# Patient Record
Sex: FEMALE | Race: WHITE | NOT HISPANIC OR LATINO | Employment: OTHER | URBAN - METROPOLITAN AREA
[De-identification: names, ages, dates, MRNs, and addresses within clinical notes are randomized per-mention and may not be internally consistent; named-entity substitution may affect disease eponyms.]

---

## 2021-01-17 ENCOUNTER — IMMUNIZATION (OUTPATIENT)
Dept: INTERNAL MEDICINE | Facility: CLINIC | Age: 86
End: 2021-01-17
Payer: MEDICARE

## 2021-01-17 DIAGNOSIS — Z23 NEED FOR VACCINATION: Primary | ICD-10-CM

## 2021-01-17 PROCEDURE — 91300 COVID-19, MRNA, LNP-S, PF, 30 MCG/0.3 ML DOSE VACCINE: CPT | Mod: PBBFAC,PO

## 2021-02-07 ENCOUNTER — IMMUNIZATION (OUTPATIENT)
Dept: INTERNAL MEDICINE | Facility: CLINIC | Age: 86
End: 2021-02-07
Payer: MEDICARE

## 2021-02-07 DIAGNOSIS — Z23 NEED FOR VACCINATION: Primary | ICD-10-CM

## 2021-02-07 PROCEDURE — 0002A COVID-19, MRNA, LNP-S, PF, 30 MCG/0.3 ML DOSE VACCINE: CPT | Mod: PBBFAC,PO

## 2021-02-07 PROCEDURE — 91300 COVID-19, MRNA, LNP-S, PF, 30 MCG/0.3 ML DOSE VACCINE: CPT | Mod: PBBFAC,PO

## 2021-10-22 ENCOUNTER — TELEPHONE (OUTPATIENT)
Dept: CARDIOLOGY | Facility: CLINIC | Age: 86
End: 2021-10-22

## 2021-11-08 ENCOUNTER — LAB VISIT (OUTPATIENT)
Dept: LAB | Facility: HOSPITAL | Age: 86
End: 2021-11-08
Attending: INTERNAL MEDICINE
Payer: MEDICARE

## 2021-11-08 ENCOUNTER — OFFICE VISIT (OUTPATIENT)
Dept: CARDIOLOGY | Facility: CLINIC | Age: 86
End: 2021-11-08
Payer: MEDICARE

## 2021-11-08 VITALS
DIASTOLIC BLOOD PRESSURE: 74 MMHG | HEART RATE: 97 BPM | HEIGHT: 61 IN | WEIGHT: 129.63 LBS | SYSTOLIC BLOOD PRESSURE: 133 MMHG | BODY MASS INDEX: 24.47 KG/M2

## 2021-11-08 DIAGNOSIS — E78.00 PURE HYPERCHOLESTEROLEMIA: ICD-10-CM

## 2021-11-08 DIAGNOSIS — I50.32 CHRONIC DIASTOLIC CONGESTIVE HEART FAILURE: ICD-10-CM

## 2021-11-08 DIAGNOSIS — I73.9 PERIPHERAL VASCULAR DISEASE: ICD-10-CM

## 2021-11-08 DIAGNOSIS — I44.2 CHB (COMPLETE HEART BLOCK): ICD-10-CM

## 2021-11-08 DIAGNOSIS — N18.2 CKD (CHRONIC KIDNEY DISEASE) STAGE 2, GFR 60-89 ML/MIN: ICD-10-CM

## 2021-11-08 DIAGNOSIS — Z95.1 STATUS POST CORONARY ARTERY BYPASS GRAFTING: ICD-10-CM

## 2021-11-08 DIAGNOSIS — I35.0 AORTIC STENOSIS, SEVERE: ICD-10-CM

## 2021-11-08 DIAGNOSIS — I25.119 CORONARY ARTERY DISEASE INVOLVING NATIVE CORONARY ARTERY OF NATIVE HEART WITH ANGINA PECTORIS: Primary | ICD-10-CM

## 2021-11-08 DIAGNOSIS — Z95.0 CARDIAC RESYNCHRONIZATION THERAPY PACEMAKER (CRT-P) IN PLACE: ICD-10-CM

## 2021-11-08 DIAGNOSIS — I10 PRIMARY HYPERTENSION: ICD-10-CM

## 2021-11-08 DIAGNOSIS — Z95.3 HISTORY OF AORTIC VALVE REPLACEMENT WITH BIOPROSTHETIC VALVE: ICD-10-CM

## 2021-11-08 PROBLEM — E78.5 HYPERLIPIDEMIA: Status: ACTIVE | Noted: 2021-11-08

## 2021-11-08 PROBLEM — M81.0 OSTEOPOROSIS: Status: ACTIVE | Noted: 2021-11-08

## 2021-11-08 PROBLEM — I50.9 CONGESTIVE HEART FAILURE: Status: ACTIVE | Noted: 2021-10-04

## 2021-11-08 LAB
ANION GAP SERPL CALC-SCNC: 13 MMOL/L (ref 8–16)
BASOPHILS # BLD AUTO: 0.06 K/UL (ref 0–0.2)
BASOPHILS NFR BLD: 0.9 % (ref 0–1.9)
BUN SERPL-MCNC: 15 MG/DL (ref 8–23)
CALCIUM SERPL-MCNC: 9.6 MG/DL (ref 8.7–10.5)
CHLORIDE SERPL-SCNC: 97 MMOL/L (ref 95–110)
CO2 SERPL-SCNC: 24 MMOL/L (ref 23–29)
CREAT SERPL-MCNC: 1.2 MG/DL (ref 0.5–1.4)
DIFFERENTIAL METHOD: ABNORMAL
EOSINOPHIL # BLD AUTO: 0.5 K/UL (ref 0–0.5)
EOSINOPHIL NFR BLD: 7 % (ref 0–8)
ERYTHROCYTE [DISTWIDTH] IN BLOOD BY AUTOMATED COUNT: 15.5 % (ref 11.5–14.5)
EST. GFR  (AFRICAN AMERICAN): 47.6 ML/MIN/1.73 M^2
EST. GFR  (NON AFRICAN AMERICAN): 41.3 ML/MIN/1.73 M^2
GLUCOSE SERPL-MCNC: 96 MG/DL (ref 70–110)
HCT VFR BLD AUTO: 33.3 % (ref 37–48.5)
HGB BLD-MCNC: 10.4 G/DL (ref 12–16)
IMM GRANULOCYTES # BLD AUTO: 0.04 K/UL (ref 0–0.04)
IMM GRANULOCYTES NFR BLD AUTO: 0.6 % (ref 0–0.5)
LYMPHOCYTES # BLD AUTO: 1.7 K/UL (ref 1–4.8)
LYMPHOCYTES NFR BLD: 24.7 % (ref 18–48)
MCH RBC QN AUTO: 28.7 PG (ref 27–31)
MCHC RBC AUTO-ENTMCNC: 31.2 G/DL (ref 32–36)
MCV RBC AUTO: 92 FL (ref 82–98)
MONOCYTES # BLD AUTO: 0.7 K/UL (ref 0.3–1)
MONOCYTES NFR BLD: 9.7 % (ref 4–15)
NEUTROPHILS # BLD AUTO: 3.8 K/UL (ref 1.8–7.7)
NEUTROPHILS NFR BLD: 57.1 % (ref 38–73)
NRBC BLD-RTO: 0 /100 WBC
PLATELET # BLD AUTO: 389 K/UL (ref 150–450)
PMV BLD AUTO: 9.2 FL (ref 9.2–12.9)
POTASSIUM SERPL-SCNC: 4 MMOL/L (ref 3.5–5.1)
RBC # BLD AUTO: 3.63 M/UL (ref 4–5.4)
SODIUM SERPL-SCNC: 134 MMOL/L (ref 136–145)
WBC # BLD AUTO: 6.67 K/UL (ref 3.9–12.7)

## 2021-11-08 PROCEDURE — 1160F PR REVIEW ALL MEDS BY PRESCRIBER/CLIN PHARMACIST DOCUMENTED: ICD-10-PCS | Mod: CPTII,S$GLB,, | Performed by: INTERNAL MEDICINE

## 2021-11-08 PROCEDURE — 1159F PR MEDICATION LIST DOCUMENTED IN MEDICAL RECORD: ICD-10-PCS | Mod: CPTII,S$GLB,, | Performed by: INTERNAL MEDICINE

## 2021-11-08 PROCEDURE — 1126F PR PAIN SEVERITY QUANTIFIED, NO PAIN PRESENT: ICD-10-PCS | Mod: CPTII,S$GLB,, | Performed by: INTERNAL MEDICINE

## 2021-11-08 PROCEDURE — 80048 BASIC METABOLIC PNL TOTAL CA: CPT | Performed by: INTERNAL MEDICINE

## 2021-11-08 PROCEDURE — 99999 PR PBB SHADOW E&M-EST. PATIENT-LVL III: CPT | Mod: PBBFAC,,, | Performed by: INTERNAL MEDICINE

## 2021-11-08 PROCEDURE — 93000 EKG 12-LEAD: ICD-10-PCS | Mod: S$GLB,,, | Performed by: INTERNAL MEDICINE

## 2021-11-08 PROCEDURE — 1101F PT FALLS ASSESS-DOCD LE1/YR: CPT | Mod: CPTII,S$GLB,, | Performed by: INTERNAL MEDICINE

## 2021-11-08 PROCEDURE — 3288F FALL RISK ASSESSMENT DOCD: CPT | Mod: CPTII,S$GLB,, | Performed by: INTERNAL MEDICINE

## 2021-11-08 PROCEDURE — 3075F PR MOST RECENT SYSTOLIC BLOOD PRESS GE 130-139MM HG: ICD-10-PCS | Mod: CPTII,S$GLB,, | Performed by: INTERNAL MEDICINE

## 2021-11-08 PROCEDURE — 3075F SYST BP GE 130 - 139MM HG: CPT | Mod: CPTII,S$GLB,, | Performed by: INTERNAL MEDICINE

## 2021-11-08 PROCEDURE — 99999 PR PBB SHADOW E&M-EST. PATIENT-LVL III: ICD-10-PCS | Mod: PBBFAC,,, | Performed by: INTERNAL MEDICINE

## 2021-11-08 PROCEDURE — 1101F PR PT FALLS ASSESS DOC 0-1 FALLS W/OUT INJ PAST YR: ICD-10-PCS | Mod: CPTII,S$GLB,, | Performed by: INTERNAL MEDICINE

## 2021-11-08 PROCEDURE — 1126F AMNT PAIN NOTED NONE PRSNT: CPT | Mod: CPTII,S$GLB,, | Performed by: INTERNAL MEDICINE

## 2021-11-08 PROCEDURE — 93000 ELECTROCARDIOGRAM COMPLETE: CPT | Mod: S$GLB,,, | Performed by: INTERNAL MEDICINE

## 2021-11-08 PROCEDURE — 85025 COMPLETE CBC W/AUTO DIFF WBC: CPT | Performed by: INTERNAL MEDICINE

## 2021-11-08 PROCEDURE — 3288F PR FALLS RISK ASSESSMENT DOCUMENTED: ICD-10-PCS | Mod: CPTII,S$GLB,, | Performed by: INTERNAL MEDICINE

## 2021-11-08 PROCEDURE — 99204 OFFICE O/P NEW MOD 45 MIN: CPT | Mod: S$GLB,,, | Performed by: INTERNAL MEDICINE

## 2021-11-08 PROCEDURE — 99204 PR OFFICE/OUTPT VISIT, NEW, LEVL IV, 45-59 MIN: ICD-10-PCS | Mod: S$GLB,,, | Performed by: INTERNAL MEDICINE

## 2021-11-08 PROCEDURE — 36415 COLL VENOUS BLD VENIPUNCTURE: CPT | Mod: PO | Performed by: INTERNAL MEDICINE

## 2021-11-08 PROCEDURE — 3078F DIAST BP <80 MM HG: CPT | Mod: CPTII,S$GLB,, | Performed by: INTERNAL MEDICINE

## 2021-11-08 PROCEDURE — 1159F MED LIST DOCD IN RCRD: CPT | Mod: CPTII,S$GLB,, | Performed by: INTERNAL MEDICINE

## 2021-11-08 PROCEDURE — 1160F RVW MEDS BY RX/DR IN RCRD: CPT | Mod: CPTII,S$GLB,, | Performed by: INTERNAL MEDICINE

## 2021-11-08 PROCEDURE — 3078F PR MOST RECENT DIASTOLIC BLOOD PRESSURE < 80 MM HG: ICD-10-PCS | Mod: CPTII,S$GLB,, | Performed by: INTERNAL MEDICINE

## 2021-11-08 RX ORDER — POTASSIUM CHLORIDE 750 MG/1
10 CAPSULE, EXTENDED RELEASE ORAL DAILY
COMMUNITY
End: 2021-11-22 | Stop reason: SDUPTHER

## 2021-11-08 RX ORDER — METOPROLOL TARTRATE 25 MG/1
25 TABLET, FILM COATED ORAL DAILY
COMMUNITY
End: 2021-11-22 | Stop reason: SDUPTHER

## 2021-11-08 RX ORDER — ATORVASTATIN CALCIUM 40 MG/1
40 TABLET, FILM COATED ORAL DAILY
COMMUNITY
End: 2021-11-22 | Stop reason: SDUPTHER

## 2021-11-08 RX ORDER — ACETAMINOPHEN AND CODEINE PHOSPHATE 300; 15 MG/1; MG/1
1 TABLET ORAL EVERY 4 HOURS PRN
COMMUNITY
End: 2022-04-14

## 2021-11-08 RX ORDER — MULTIVITAMIN
1 TABLET ORAL DAILY
COMMUNITY

## 2021-11-08 RX ORDER — FUROSEMIDE 40 MG/1
40 TABLET ORAL 2 TIMES DAILY
COMMUNITY
End: 2021-11-22 | Stop reason: SDUPTHER

## 2021-11-08 RX ORDER — ACETAMINOPHEN 325 MG/1
325 TABLET ORAL DAILY PRN
COMMUNITY

## 2021-11-08 RX ORDER — ASPIRIN 325 MG
325 TABLET ORAL DAILY
COMMUNITY
End: 2021-11-22 | Stop reason: SDUPTHER

## 2021-11-08 RX ORDER — LORATADINE 10 MG/1
10 TABLET ORAL DAILY
COMMUNITY

## 2021-11-09 ENCOUNTER — TELEPHONE (OUTPATIENT)
Dept: CARDIOLOGY | Facility: CLINIC | Age: 86
End: 2021-11-09
Payer: MEDICARE

## 2021-11-15 ENCOUNTER — TELEPHONE (OUTPATIENT)
Dept: CARDIOLOGY | Facility: CLINIC | Age: 86
End: 2021-11-15
Payer: MEDICARE

## 2021-11-19 ENCOUNTER — TELEPHONE (OUTPATIENT)
Dept: CARDIAC REHAB | Facility: CLINIC | Age: 86
End: 2021-11-19
Payer: MEDICARE

## 2021-11-22 RX ORDER — ASPIRIN 325 MG
325 TABLET ORAL DAILY
Qty: 90 TABLET | Refills: 3 | Status: SHIPPED | OUTPATIENT
Start: 2021-11-22

## 2021-11-22 RX ORDER — METOPROLOL TARTRATE 25 MG/1
25 TABLET, FILM COATED ORAL DAILY
Qty: 90 TABLET | Refills: 3 | Status: SHIPPED | OUTPATIENT
Start: 2021-11-22 | End: 2021-12-17 | Stop reason: SDUPTHER

## 2021-11-22 RX ORDER — ATORVASTATIN CALCIUM 40 MG/1
40 TABLET, FILM COATED ORAL DAILY
Qty: 90 TABLET | Refills: 3 | Status: SHIPPED | OUTPATIENT
Start: 2021-11-22 | End: 2022-04-14 | Stop reason: SDUPTHER

## 2021-11-22 RX ORDER — POTASSIUM CHLORIDE 750 MG/1
10 CAPSULE, EXTENDED RELEASE ORAL DAILY
Qty: 90 CAPSULE | Refills: 3 | Status: SHIPPED | OUTPATIENT
Start: 2021-11-22 | End: 2021-12-17 | Stop reason: SDUPTHER

## 2021-11-22 RX ORDER — FUROSEMIDE 40 MG/1
40 TABLET ORAL 2 TIMES DAILY
Qty: 90 TABLET | Refills: 3 | Status: ON HOLD | OUTPATIENT
Start: 2021-11-22 | End: 2022-07-06 | Stop reason: SDUPTHER

## 2021-11-23 DIAGNOSIS — I25.10 CORONARY ARTERY DISEASE INVOLVING NATIVE CORONARY ARTERY OF NATIVE HEART WITHOUT ANGINA PECTORIS: ICD-10-CM

## 2021-11-23 DIAGNOSIS — Z95.1 POSTSURGICAL AORTOCORONARY BYPASS STATUS: Primary | ICD-10-CM

## 2021-12-08 ENCOUNTER — TELEPHONE (OUTPATIENT)
Dept: CARDIAC REHAB | Facility: CLINIC | Age: 86
End: 2021-12-08
Payer: MEDICARE

## 2021-12-13 ENCOUNTER — HOSPITAL ENCOUNTER (OUTPATIENT)
Dept: CARDIOLOGY | Facility: HOSPITAL | Age: 86
Discharge: HOME | End: 2021-12-13
Attending: INTERNAL MEDICINE
Payer: MEDICARE

## 2021-12-13 VITALS
SYSTOLIC BLOOD PRESSURE: 157 MMHG | DIASTOLIC BLOOD PRESSURE: 72 MMHG | HEIGHT: 61 IN | WEIGHT: 130 LBS | BODY MASS INDEX: 24.55 KG/M2 | HEART RATE: 88 BPM

## 2021-12-13 DIAGNOSIS — Z95.1 POSTSURGICAL AORTOCORONARY BYPASS STATUS: ICD-10-CM

## 2021-12-13 DIAGNOSIS — I25.10 CORONARY ARTERY DISEASE INVOLVING NATIVE CORONARY ARTERY OF NATIVE HEART WITHOUT ANGINA PECTORIS: ICD-10-CM

## 2021-12-13 LAB
CV STRESS BASE HR: 88 BPM
DIASTOLIC BLOOD PRESSURE: 72 MMHG
OHS CV CPX 1 MINUTE RECOVERY HEART RATE: 105 BPM
OHS CV CPX 85 PERCENT MAX PREDICTED HEART RATE MALE: 112
OHS CV CPX ABDOMINAL GIRTH: 36 CM
OHS CV CPX DATA GRADE - PEAK: 1
OHS CV CPX DATA O2 SAT - PEAK: 98
OHS CV CPX DATA O2 SAT - REST: 98
OHS CV CPX DATA SPEED - PEAK: 1.2
OHS CV CPX DATA TIME - PEAK: 1.55
OHS CV CPX DATA VE/VCO2 - PEAK: 111
OHS CV CPX DATA VE/VO2 - PEAK: 90
OHS CV CPX DATA VO2 - PEAK: 9.5
OHS CV CPX DATA VO2 - REST: 5.2
OHS CV CPX ESTIMATED METS: 2
OHS CV CPX FEV1/FVC: 0.93
OHS CV CPX FORCED EXPIRATORY VOLUME: 0.97
OHS CV CPX FORCED VITAL CAPACITY (FVC): 1.04
OHS CV CPX HIGHEST VO: 25.1
OHS CV CPX MAX PREDICTED HEART RATE: 131
OHS CV CPX MAXIMAL VOLUNTARY VENTILATION (MVV) PREDICTED: 38.8
OHS CV CPX MAXIMAL VOLUNTARY VENTILATION (MVV): 31
OHS CV CPX MAXIUMUM EXERCISE VENTILATION (VE MAX): 14
OHS CV CPX PATIENT AGE: 85
OHS CV CPX PATIENT HEIGHT IN: 61
OHS CV CPX PATIENT IS FEMALE AGE 11-19: 0
OHS CV CPX PATIENT IS FEMALE AGE GREATER THAN 19: 1
OHS CV CPX PATIENT IS FEMALE AGE LESS THAN 11: 0
OHS CV CPX PATIENT IS FEMALE: 1
OHS CV CPX PATIENT IS MALE AGE 11-25: 0
OHS CV CPX PATIENT IS MALE AGE GREATER THAN 25: 0
OHS CV CPX PATIENT IS MALE AGE LESS THAN 11: 0
OHS CV CPX PATIENT IS MALE GREATER THAN 18: 0
OHS CV CPX PATIENT IS MALE LESS THAN OR EQUAL TO 18: 0
OHS CV CPX PATIENT IS MALE: 0
OHS CV CPX PATIENT WEIGHT RETURNED IN OZ: 2080
OHS CV CPX PEAK DIASTOLIC BLOOD PRESSURE: 80 MMHG
OHS CV CPX PEAK HEAR RATE: 104 BPM
OHS CV CPX PEAK RATE PRESSURE PRODUCT: NORMAL
OHS CV CPX PEAK SYSTOLIC BLOOD PRESSURE: 167 MMHG
OHS CV CPX PERCENT BODY FAT: 27.3
OHS CV CPX PERCENT MAX PREDICTED HEART RATE ACHIEVED: 79
OHS CV CPX PREDICTED VO2: 25.1 ML/KG/MIN
OHS CV CPX RATE PRESSURE PRODUCT PRESENTING: NORMAL
OHS CV CPX REST PET CO2: 24
OHS CV CPX VE/VCO2 SLOPE: 37.7
STRESS ECHO POST EXERCISE DUR MIN: 1 MINUTES
STRESS ECHO POST EXERCISE DUR SEC: 33 SECONDS
SYSTOLIC BLOOD PRESSURE: 157 MMHG

## 2021-12-13 PROCEDURE — 94621 CARDIOPULM EXERCISE TESTING: CPT | Mod: 26,,, | Performed by: INTERNAL MEDICINE

## 2021-12-13 PROCEDURE — 94621 CARDIOPULM EXERCISE TESTING: CPT

## 2021-12-13 PROCEDURE — 94621 CARDIOPULMONARY EXERCISE TESTING (CUPID ONLY): ICD-10-PCS | Mod: 26,,, | Performed by: INTERNAL MEDICINE

## 2021-12-17 ENCOUNTER — OFFICE VISIT (OUTPATIENT)
Dept: CARDIOLOGY | Facility: CLINIC | Age: 86
End: 2021-12-17
Payer: MEDICARE

## 2021-12-17 VITALS
HEIGHT: 61 IN | SYSTOLIC BLOOD PRESSURE: 134 MMHG | WEIGHT: 130 LBS | DIASTOLIC BLOOD PRESSURE: 69 MMHG | HEART RATE: 69 BPM | BODY MASS INDEX: 24.55 KG/M2

## 2021-12-17 DIAGNOSIS — I25.119 CORONARY ARTERY DISEASE INVOLVING NATIVE CORONARY ARTERY OF NATIVE HEART WITH ANGINA PECTORIS: Primary | Chronic | ICD-10-CM

## 2021-12-17 DIAGNOSIS — Z95.1 STATUS POST CORONARY ARTERY BYPASS GRAFTING: Chronic | ICD-10-CM

## 2021-12-17 DIAGNOSIS — Z95.0 CARDIAC RESYNCHRONIZATION THERAPY PACEMAKER (CRT-P) IN PLACE: ICD-10-CM

## 2021-12-17 DIAGNOSIS — Z95.3 HISTORY OF AORTIC VALVE REPLACEMENT WITH BIOPROSTHETIC VALVE: Chronic | ICD-10-CM

## 2021-12-17 DIAGNOSIS — I10 PRIMARY HYPERTENSION: Chronic | ICD-10-CM

## 2021-12-17 DIAGNOSIS — I35.0 AORTIC STENOSIS, SEVERE: Chronic | ICD-10-CM

## 2021-12-17 DIAGNOSIS — E78.00 PURE HYPERCHOLESTEROLEMIA: Chronic | ICD-10-CM

## 2021-12-17 PROBLEM — I73.9 PERIPHERAL VASCULAR DISEASE: Chronic | Status: ACTIVE | Noted: 2021-11-08

## 2021-12-17 PROBLEM — I44.2 CHB (COMPLETE HEART BLOCK): Chronic | Status: ACTIVE | Noted: 2021-10-09

## 2021-12-17 PROBLEM — N18.31 STAGE 3A CHRONIC KIDNEY DISEASE: Status: ACTIVE | Noted: 2021-10-18

## 2021-12-17 PROCEDURE — 99214 PR OFFICE/OUTPT VISIT, EST, LEVL IV, 30-39 MIN: ICD-10-PCS | Mod: S$GLB,,, | Performed by: INTERNAL MEDICINE

## 2021-12-17 PROCEDURE — 99999 PR PBB SHADOW E&M-EST. PATIENT-LVL III: ICD-10-PCS | Mod: PBBFAC,,, | Performed by: INTERNAL MEDICINE

## 2021-12-17 PROCEDURE — 99999 PR PBB SHADOW E&M-EST. PATIENT-LVL III: CPT | Mod: PBBFAC,,, | Performed by: INTERNAL MEDICINE

## 2021-12-17 PROCEDURE — 99214 OFFICE O/P EST MOD 30 MIN: CPT | Mod: S$GLB,,, | Performed by: INTERNAL MEDICINE

## 2021-12-17 RX ORDER — METOPROLOL TARTRATE 25 MG/1
25 TABLET, FILM COATED ORAL DAILY
Qty: 90 TABLET | Refills: 3 | Status: ON HOLD | OUTPATIENT
Start: 2021-12-17 | End: 2022-07-06 | Stop reason: HOSPADM

## 2021-12-17 RX ORDER — POTASSIUM CHLORIDE 750 MG/1
10 CAPSULE, EXTENDED RELEASE ORAL 2 TIMES DAILY
Qty: 180 CAPSULE | Refills: 3 | Status: SHIPPED | OUTPATIENT
Start: 2021-12-17 | End: 2022-01-12 | Stop reason: SDUPTHER

## 2021-12-23 ENCOUNTER — CLINICAL SUPPORT (OUTPATIENT)
Dept: CARDIAC REHAB | Facility: CLINIC | Age: 86
End: 2021-12-23
Payer: MEDICARE

## 2021-12-23 VITALS — OXYGEN SATURATION: 99 % | SYSTOLIC BLOOD PRESSURE: 172 MMHG | HEART RATE: 80 BPM | DIASTOLIC BLOOD PRESSURE: 70 MMHG

## 2021-12-23 DIAGNOSIS — Z95.3 HISTORY OF AORTIC VALVE REPLACEMENT WITH BIOPROSTHETIC VALVE: ICD-10-CM

## 2021-12-23 DIAGNOSIS — I25.10 CORONARY ARTERY DISEASE, UNSPECIFIED VESSEL OR LESION TYPE, UNSPECIFIED WHETHER ANGINA PRESENT, UNSPECIFIED WHETHER NATIVE OR TRANSPLANTED HEART: ICD-10-CM

## 2021-12-23 DIAGNOSIS — Z95.1 STATUS POST CORONARY ARTERY BYPASS GRAFTING: ICD-10-CM

## 2021-12-23 PROCEDURE — 99999 PR PBB SHADOW E&M-EST. PATIENT-LVL III: ICD-10-PCS | Mod: PBBFAC,,,

## 2021-12-23 PROCEDURE — 99999 PR PBB SHADOW E&M-EST. PATIENT-LVL III: CPT | Mod: PBBFAC,,,

## 2021-12-23 PROCEDURE — 93798 PHYS/QHP OP CAR RHAB W/ECG: CPT | Mod: S$GLB,,, | Performed by: INTERNAL MEDICINE

## 2021-12-23 PROCEDURE — 93798 PR CARDIAC REHAB/MONITOR: ICD-10-PCS | Mod: S$GLB,,, | Performed by: INTERNAL MEDICINE

## 2022-01-12 ENCOUNTER — CLINICAL SUPPORT (OUTPATIENT)
Dept: CARDIAC REHAB | Facility: CLINIC | Age: 87
End: 2022-01-12
Payer: MEDICARE

## 2022-01-12 DIAGNOSIS — Z95.1 POSTSURGICAL AORTOCORONARY BYPASS STATUS: ICD-10-CM

## 2022-01-12 DIAGNOSIS — I25.119 CORONARY ARTERY DISEASE INVOLVING NATIVE CORONARY ARTERY OF NATIVE HEART WITH ANGINA PECTORIS: Chronic | ICD-10-CM

## 2022-01-12 DIAGNOSIS — I25.10 ATHEROSCLEROSIS OF NATIVE CORONARY ARTERY OF NATIVE HEART, UNSPECIFIED WHETHER ANGINA PRESENT: ICD-10-CM

## 2022-01-12 PROCEDURE — 93798 PR CARDIAC REHAB/MONITOR: ICD-10-PCS | Mod: S$GLB,,,

## 2022-01-12 PROCEDURE — 93798 PHYS/QHP OP CAR RHAB W/ECG: CPT | Mod: S$GLB,,,

## 2022-01-12 RX ORDER — POTASSIUM CHLORIDE 750 MG/1
10 CAPSULE, EXTENDED RELEASE ORAL 2 TIMES DAILY
Qty: 180 CAPSULE | Refills: 3 | Status: SHIPPED | OUTPATIENT
Start: 2022-01-12 | End: 2022-07-04

## 2022-01-12 NOTE — PROGRESS NOTES
Pt arrived for cardiac rehab class.  Pt ate and had no medication changes. The patient tolerated exercise session well with no complaints

## 2022-01-12 NOTE — TELEPHONE ENCOUNTER
----- Message from Héctor Maher sent at 1/12/2022  9:18 AM CST -----  Contact: carmina [phone]  Mrn# [id number]    Callback# [phone]    Additional Info# PT was supposed to get a refill of 180 but PT only had 90 and now the PT is completely out. Please callback when sent to pharmacy.     Medication- potassium chloride (MICRO-K) 10 MEQ [facility]    Pharmacy- [id number]  [address]UNOZ  [address] AT [facility]    Pharmacy#- [phone]

## 2022-01-13 ENCOUNTER — DOCUMENTATION ONLY (OUTPATIENT)
Dept: CARDIAC REHAB | Facility: CLINIC | Age: 87
End: 2022-01-13
Payer: MEDICARE

## 2022-01-13 NOTE — PROGRESS NOTES
Miss Lutz has completed 1 out of 36 exercise session of Phase II cardiac rehab.  A follow up reassessment will be completed at 12 sessions.    Session: Orientation   Cardiac Rehab Individual Treatment Plan - Initial Assessment      Patient Name: Nelda Rawls MRN: [id number]   : 1935   Age: 86 y.o.   Primary Diagnosis: CABG  Date of Event: 10-14-21  EF: ?%  Risk Stratification: high  Referring Physician: YE   Exercise Assessment:     CPX/TM Date: 21 Results   RHR 88   Max    Peak VO2 (CPX only) 9.5   Actual METS (CPX only) 2.7   Estimated METS 2.0     Anthropometrics    Height 61 inches   Weight 130 lbs   BMI 24.6   Abdominal Girth 36   Body Composition 27.3%     ST Depression noted on Stress Test?:No  Angina with exercise?: No   Fall Risk: No   Assistive Devices:  walker   Currently exercising? No   There were no limitations noted by the patient.      Exercise Plan:   Goals:  CR Exercise Goals: Attend Cardiac Rehab 3 times/week: In Progress  Home Aerobic Exercise: 2 additional days/week for 30-60 minutes: In Progress  Intensity of 12-15 on the Rate of Perceived Exertion (RPE) scale: In Progress  30% increase in entry estimated METS: 2.6 : In Progress  5 days/week for 30-60 minutes: In Progress    Intervention:   Discussed importance of regular attendance to cardiac rehab class    Exercise Prescription:  THR Range    Mode: Treadmill  Recumbent Bike  Nustep  Elliptical   Frequency:  3 days/week   Duration:  30 - 60 minutes   Intensity:  12 - 15 RPE   Resistance Training:  Yes: 0 to 5 lb weights with 10-15 reps based on strength and range of motion assessment     Home Prescription:  Mode Aerobic   Frequency: 2- 3 days/week   Duration: 30-60 minutes   Resistance Training: None        Education:  Orientation to Equipment; verbalizes understanding; Date: 21  Exercise Recommendations; verbalizes understanding; Date: 21  Exercise Safety; verbalizes understanding; Date:  21  Class Preparation: verbalizes understanding; Date: 21  Signs and symptoms to report: verbalizes understanding; Date: 21  Caffeine/Hydration: verbalizes understanding; Date: 21  Exercise Terminology: verbalizes understanding; Date: 21  Resistance Training: verbalizes understanding; Date: 21    Comments:  Miss Lutz was encouraged to begin thinking about some type of aerobic exercise she can participate in at least 2 non-rehab days per week for at least 30 minutes in addition to attending Phase II cardiac rehab classes 3 days per week.  I suggested she walk around her home 10 minutes 3 times per day but to also work toward that goal slowly.  She stated understanding.    All consent forms were signed, proper attire and shoes were discussed.       Miss Lutz will begin Cardiac Rehab on  at 10:00am.    The exercise prescription will be adjusted based on tolerance of exercise intensity by patient.    Sohan Singh, CEP    Orientation Cardiac Rehab Individual Treatment Plan - Initial Assessment      Patient Name: Nelda Rawls MRN: [id number]   : 1935   Age: 86 y.o.   Primary Diagnosis: s/p CABG, CAD, HTN, HLD, PVD, CHF, complete heart block, + pacemaker, h/o AVR, CKD III    Nutrition Assessment:     Anthropometrics    Height 61 inches   Weight 130 lbs   BMI 24.6   Abdominal Girth 36   Body Composition 27.3       Drug Allergies and Intolerances:  Review of patient's allergies indicates:   Allergen Reactions    Penicillins Hives and Swelling       Food Allergies and Intolerances:  NA    Past Medical History:  No past medical history on file.    Past Surgical History:  Past Surgical History:   Procedure Laterality Date    CORONARY ARTERY BYPASS GRAFT         Medications:  Current Outpatient Medications   Medication Sig    acetaminophen (TYLENOL) 325 MG tablet Take 325 mg by mouth as needed for Pain.    acetaminophen-codeine 300-15mg (TYLENOL #2) 300-15 mg  per tablet Take 1 tablet by mouth every 4 (four) hours as needed for Pain.    aspirin 325 MG tablet Take 1 tablet (325 mg total) by mouth once daily.    atorvastatin (LIPITOR) 40 MG tablet Take 1 tablet (40 mg total) by mouth once daily.    furosemide (LASIX) 40 MG tablet Take 1 tablet (40 mg total) by mouth 2 (two) times daily.    loratadine (CLARITIN) 10 mg tablet Take 10 mg by mouth once daily.    metoprolol tartrate (LOPRESSOR) 25 MG tablet Take 1 tablet (25 mg total) by mouth Daily. Takes 0.5mg BID    multivitamin (THERAGRAN) per tablet Take 1 tablet by mouth once daily.    potassium chloride (MICRO-K) 10 MEQ [facility] Take 1 capsule (10 mEq total) by mouth 2 (two) times daily.     No current facility-administered medications for this visit.       Vitamins and Supplements:  MVI, micro-K 10meq BID (lasix 40mg BID)    Labs:  Patient confirms she is taking lipitor 40mg for cholesterol control.    Lab Results   Component Value Date    CHOL 128 12/13/2021     Lab Results   Component Value Date    HDL 53 12/13/2021     Lab Results   Component Value Date    LDLCALC 61.4 (L) 12/13/2021     Lab Results   Component Value Date    TRIG 68 12/13/2021     Lab Results   Component Value Date    CHOLHDL 41.4 12/13/2021         Lab Results   Component Value Date    GLUF 96 12/13/2021     No results found for: HGBA1C    Nutrition/Diet History:  Patient eats 3 meals daily.    Seasons food with Mrs. Nagy.  Patient denies use of a salt shaker at the table on prepared foods.   Dines out 1 per week at restaurants such as [employer], [employer].    Chooses fried foods rarely  Chooses fish rarely   Beverages:  water, coffee  Alcohol: none    24 Hour Recall:  · Breakfast: oatmeal, banana, black coffee x 2  · Lunch:turkey with american cheese, miracle whip/mustard on honey wheat bread, veggie chips  · Dinner:roast with boiled potatoes, emma slaw, peach cobbler  · Other: 3 sandwich cookies    Difficulty Chewing or  Swallowing: no  Current Exercise: See Exercise Physiologist Note  Food Safety/Food Preparation: pt has daughters/helper that prepares food and freezes  Living Arrangements/Family Support: Lives with spouse  Cultural/Spiritual/Personal Preferences: not applicable   Barriers to Education: none identified  Stage of Change Related to Diet Habits: Contemplation    Nutrition Diagnosis:  1. Food and nutrition related knowledge deficit related to the lack of prior nutrition education as evidenced by diet history and 24 hour recall    Nutrition Plan:   Goals:  LDL-C < 70 (for high risk patients)  Hgb A1c < 7%  BMI < 25 and abdominal girth < 40M/<35 F  2 gram sodium, Mediterranean diet  Rehab weight goal: Maintenance  Fish intake (non-fried varieties) to a goal of 2-3 servings per week.   Increase fruit and vegetable intake    Interventions/Recommendations:  Lab results reviewed and discussed  Nutrition Prescription:  · Total Energy Estimated Needs: 5995-1178 Kcal/d for weight maintenance  · Method for Estimating Needs: 25-30kcal/kg  · Total Protein Estimated Needs: 35-59g/d  · Method for Estimating Needs: 0.6-1 g/Kg BW (reduced needs r/t dx CKD III)  · Total Fluid Estimated Needs: 1 mL/Kcal  Dietitian Consult: No  Patient to participate in Cardiac Rehab sessions three times a week  Weekly Dietitian Weight Check  Encouraged patient to complete 3 day food diary  Follow Up Plan for Ongoing Self-Management Support    Education:  Mediterranean Diet; verbalizes understanding; Date: 12/23/21   Person taught: patient and daughter   Preferred Learning Method: Verbal and written   Education Needed/Provided: Nutrition counseling and education related to cardiac rehabilitation   Education Method: Weekly nutrition lectures on the Mediterranean diet, cooking, shopping, and dining out   Written Materials Provided: 3 Day Food Record, Introduction to Mediterranean Diet   Strategies Implemented: Motivational interviewing, Goal setting,  Self-Monitoring, and Problem Solving    Comments:   Discussed ways to incorporate healthy snacks, eating on a schedule, and monitoring sodium intake for heart health.  Encouraged increased produce intake and increased hydration    Diabetes  Is the patient diabetic? No      RD contact information provided.      Brandi Quintero MS, RDN/LDN    Session: Orientation Cardiac Rehab Individual Treatment Plan - Initial Assessment      Patient Name: Nelda Rawls MRN: [id number]   : 1935   Age: 86 y.o.   Date of Event: 10/14/2021   Primary Diagnosis: CABG    EF: unknown    Physical Assessment:   There were no vitals taken for this visit.    ASSESSMENT:  Heart Sounds: regular rate and rhythm, S1, S2 normal, no murmur, click, rub or gallop  Prosthetic Valve: Yes  Lung Sounds: normal air entry, lungs clear to auscultation  Capillary Refill: normal  Left Radial Pulse: Normal (+2)  Right Radial Pulse: Normal (+2)  Left Pedal Pulse: Normal (+2)  Right Pedal Pulse: Normal (+2)  Right Edema: none  Left Edema none  Strength: normal  Range of Motion: full range of motion  Existing Limitations:      Site   [] Arthritis, bursitis    [] Amputation, atrophy    [] Other:    []       Diabetic patient's foot examination comments: None -  not diabetic  Incisional site: CABG site healing well, no drainage or redness noted  Special needs: pt currently ambulating with a walker    Psychosocial Assessment:   Outcome Survey Tools:    CATE SCORES:   PRE   Anxiety 1   Depression 0   Somatic 1   Hostility 0     SF-36 SCORES:   PRE   Physical Function 27   Social Function 7   Mental Health 29   Pain 10   Change in Health 2   Physical Role Limitation 1   Mental Role Limitation 3   Energy/Fatigue 12   Health Perceptions 22   Total Score 113     PHQ-9:  PHQ-9 Depression Patient Health Questionnaire 2021   Over the last two weeks how often have you been bothered by little interest or pleasure in doing things 0   Over the last two weeks how  often have you been bothered by feeling down, depressed or hopeless 0   Over the last two weeks how often have you been bothered by trouble falling or staying asleep, or sleeping too much 0   Over the last two weeks how often have you been bothered by feeling tired or having little energy 2   Over the last two weeks how often have you been bothered by a poor appetite or overeating 0   Over the last two weeks how often have you been bothered by feeling bad about yourself - or that you are a failure or have let yourself or your family down 0   Over the last two weeks how often have you been bothered by trouble concentrating on things, such as reading the newspaper or watching television 0   Over the last two weeks how often have you been bothered by moving or speaking so slowly that other people could have noticed. 0   Over the last two weeks how often have you been bothered by thoughts that you would be better off dead, or of hurting yourself 0   If you checked off any problems, how difficult have these problems made it for you to do your work, take care of things at home or get along with other people? Not difficult at all   Total Score 2              Living Arrangements: Lives with spouse  Family Support: children and paid caregiver  Self Reported: Effective Coping Skills and Stress  Displays: happiness and calmness  Medication: pt does not take medications at this time    Psychosocial Plan:   Goals:  Verbalizes coping mechanisms  Reduce manifestation of stress  Maintain positive support system  Maintain positive outlook  Improve overall quality of life    Interventions/Recommendations:  Discussed Results of Surveys  Patient to Self Report Emotional Changes at Session Check In  Recommend Physical Activity  Recommend Attending Education Lectures  Notify MD: No  Program Referral: No  Pharmaceutical Intervention/Therapy: No  Other Needs: not applicable  Stage of Readiness to Change: Contemplation    Education:  Stress  management, verbalizes understanding; Date:12/23/2021  Stress, verbalizes understanding; Date:12/23/2021    Comments:  Pt caring for her , verbalized increased stress with hurricaine. Pt instructed on resources for managing stress verbalized understanding.   Patient has been instructed to notify staff in the event that circumstances worsen.  Patient verbalizes understanding.    Other Core Components/Risk Factors Assessment:   RISK FACTORS:  hyperlipidemia, hypertension, positive family history, sedentary lifestyle, stress    Learning Barriers: None    Education Level:  Unknown    Pre-test Score: 50    Medication Compliance: has been compliant with taking medications    Other Core Components/Risk Factors Plan:   Goals:  Increase exercise tolerance: In Progress  Increase knowledge of CAD: In Progress  Decrease blood pressure: In Progress  Identify and manage personal areas of stress: In Progress  Learn more about healthy eating: In Progress    Interventions/Recommendations:  Recommend regular attendance for Cardiac Rehab: Exercise and Education Lectures  Encourage medication compliance  Individual Education/ Counseling: No  Physician Referral: No    Education:    blood pressure meds, verbalizes understanding; Date: 12/23/2021  cholesterol, verbalizes understanding; Date: 12/23/2021  cholesterol meds, verbalizes understanding; Date: 12/23/2021  hypertension, verbalizes understanding; Date: 12/23/2021  physical activity, verbalizes understanding; Date: 12/23/2021  risk factors, verbalizes understanding; Date: 12/23/2021  stress, verbalizes understanding; Date: 12/23/2021        Education method adapted to patients education level and preferred method of learning.  Method: explanation    Comments:  Pt instructed on risk factors, medication compliance, importance of exercise, benefits of cardiac rehab, reduction of stress and effects on the body. Pt verbalized understanding.    Other Core Components/Hypertension  Assessment:   Resting BP:   BP Readings from Last 1 Encounters:   12/23/21 (!) 172/70         BP Diagnosis: Hypertensive  Patient reported symptoms: none    Other Core Components/Hypertension Plan:   Goals:  Blood Pressure <130/80    Interventions/Recommendations:  Encourage medication compliance  Recommend physical activity  Educate on contributory factors  Reduce stress, anxiety, anger, depression, and/or chronic pain    Education:    Hypertension; verbalizes understanding; Date: 12/23/2021  Coronary Artery Disease; verbalizes understanding; Date: 12/23/2021  Risk Factors; verbalizes understanding; Date: 12/23/2021  Stress; verbalizes understanding; Date: 12/23/2021        Comments:  Pt reports compliance with medications. Pt instructed on taking medications prior to cardiac rehab, monitoring blood pressure, exercise to reduce stress levels; pt verbalized understanding.       Does the patient have Heart Failure? No    Other Core Components/Tobacco Cessation Assessment:   Smoking Status: lifetime non-smoker  Smoking Cessation Barriers: NA  Stage of Readiness to Change: Maintenance    Other Core Components/Tobacco Cessation Plan:   Goals:  Maintain non-smoking status    Interventions:  Maintains non-smoking status    Education:    Benefits of Cardiac Rehab; verbalizes understanding; Date: 12/23/2021        Comments:  Pt nonsmoker lifelong.    Discussed Cardiac Rehab program in depth with patient.  Medication list updated per patient & marked as reviewed.  Patient has been instructed to notify staff of any problems while attending rehab (ie: chest pain, shortness of breath, lightheadedness, dizziness).  Patient has been instructed to monitor blood pressure readings outside of rehab & to keep a daily log of the readings.  Patient verbalizes understanding.    Roberth Mota RN  Cardiac Rehab Nurse

## 2022-01-14 ENCOUNTER — CLINICAL SUPPORT (OUTPATIENT)
Dept: CARDIAC REHAB | Facility: CLINIC | Age: 87
End: 2022-01-14
Payer: MEDICARE

## 2022-01-14 DIAGNOSIS — I25.10 ATHEROSCLEROSIS OF NATIVE CORONARY ARTERY OF NATIVE HEART, UNSPECIFIED WHETHER ANGINA PRESENT: ICD-10-CM

## 2022-01-14 DIAGNOSIS — Z95.1 POSTSURGICAL AORTOCORONARY BYPASS STATUS: ICD-10-CM

## 2022-01-14 PROCEDURE — 93798 PR CARDIAC REHAB/MONITOR: ICD-10-PCS | Mod: S$GLB,,,

## 2022-01-14 PROCEDURE — 93798 PHYS/QHP OP CAR RHAB W/ECG: CPT | Mod: S$GLB,,,

## 2022-01-24 ENCOUNTER — TELEPHONE (OUTPATIENT)
Dept: CARDIAC REHAB | Facility: CLINIC | Age: 87
End: 2022-01-24
Payer: MEDICARE

## 2022-02-03 ENCOUNTER — DOCUMENTATION ONLY (OUTPATIENT)
Dept: CARDIAC REHAB | Facility: CLINIC | Age: 87
End: 2022-02-03
Payer: MEDICARE

## 2022-02-03 NOTE — PROGRESS NOTES
Miss Lutz has completed 3 out of 36 exercise session of Phase II cardiac rehab.  A follow up reassessment will be completed at 12 sessions. Currently, Miss Lutz is on hold due to a family emergency.    Session: Orientation   Cardiac Rehab Individual Treatment Plan - Initial Assessment      Patient Name: Nelda Rawls MRN: [id number]   : 1935   Age: 86 y.o.   Primary Diagnosis: CABG  Date of Event: 10-14-21  EF: ?%  Risk Stratification: high  Referring Physician: YE   Exercise Assessment:     CPX/TM Date: 21 Results   RHR 88   Max    Peak VO2 (CPX only) 9.5   Actual METS (CPX only) 2.7   Estimated METS 2.0     Anthropometrics    Height 61 inches   Weight 130 lbs   BMI 24.6   Abdominal Girth 36   Body Composition 27.3%     ST Depression noted on Stress Test?:No  Angina with exercise?: No   Fall Risk: No   Assistive Devices:  walker   Currently exercising? No   There were no limitations noted by the patient.      Exercise Plan:   Goals:  CR Exercise Goals: Attend Cardiac Rehab 3 times/week: In Progress  Home Aerobic Exercise: 2 additional days/week for 30-60 minutes: In Progress  Intensity of 12-15 on the Rate of Perceived Exertion (RPE) scale: In Progress  30% increase in entry estimated METS: 2.6 : In Progress  5 days/week for 30-60 minutes: In Progress    Intervention:   Discussed importance of regular attendance to cardiac rehab class    Exercise Prescription:  THR Range    Mode: Treadmill  Recumbent Bike  Nustep  Elliptical   Frequency:  3 days/week   Duration:  30 - 60 minutes   Intensity:  12 - 15 RPE   Resistance Training:  Yes: 0 to 5 lb weights with 10-15 reps based on strength and range of motion assessment     Home Prescription:  Mode Aerobic   Frequency: 2- 3 days/week   Duration: 30-60 minutes   Resistance Training: None        Education:  Orientation to Equipment; verbalizes understanding; Date: 21  Exercise Recommendations; verbalizes understanding; Date:  21  Exercise Safety; verbalizes understanding; Date: 21  Class Preparation: verbalizes understanding; Date: 21  Signs and symptoms to report: verbalizes understanding; Date: 21  Caffeine/Hydration: verbalizes understanding; Date: 21  Exercise Terminology: verbalizes understanding; Date: 21  Resistance Training: verbalizes understanding; Date: 21    Comments:  Miss Lutz was encouraged to begin thinking about some type of aerobic exercise she can participate in at least 2 non-rehab days per week for at least 30 minutes in addition to attending Phase II cardiac rehab classes 3 days per week.  I suggested she walk around her home 10 minutes 3 times per day but to also work toward that goal slowly.  She stated understanding.    All consent forms were signed, proper attire and shoes were discussed.       Miss Lutz will begin Cardiac Rehab on  at 10:00am.    The exercise prescription will be adjusted based on tolerance of exercise intensity by patient.    Sohan Singh, CEP    Orientation Cardiac Rehab Individual Treatment Plan - Initial Assessment      Patient Name: Nelda Rawls MRN: [id number]   : 1935   Age: 86 y.o.   Primary Diagnosis: s/p CABG, CAD, HTN, HLD, PVD, CHF, complete heart block, + pacemaker, h/o AVR, CKD III    Nutrition Assessment:     Anthropometrics    Height 61 inches   Weight 130 lbs   BMI 24.6   Abdominal Girth 36   Body Composition 27.3       Drug Allergies and Intolerances:  Review of patient's allergies indicates:   Allergen Reactions    Penicillins Hives and Swelling       Food Allergies and Intolerances:  NA    Past Medical History:  No past medical history on file.    Past Surgical History:  Past Surgical History:   Procedure Laterality Date    CORONARY ARTERY BYPASS GRAFT         Medications:  Current Outpatient Medications   Medication Sig    acetaminophen (TYLENOL) 325 MG tablet Take 325 mg by mouth as needed for Pain.     acetaminophen-codeine 300-15mg (TYLENOL #2) 300-15 mg per tablet Take 1 tablet by mouth every 4 (four) hours as needed for Pain.    aspirin 325 MG tablet Take 1 tablet (325 mg total) by mouth once daily.    atorvastatin (LIPITOR) 40 MG tablet Take 1 tablet (40 mg total) by mouth once daily.    furosemide (LASIX) 40 MG tablet Take 1 tablet (40 mg total) by mouth 2 (two) times daily.    loratadine (CLARITIN) 10 mg tablet Take 10 mg by mouth once daily.    metoprolol tartrate (LOPRESSOR) 25 MG tablet Take 1 tablet (25 mg total) by mouth Daily. Takes 0.5mg BID    multivitamin (THERAGRAN) per tablet Take 1 tablet by mouth once daily.    potassium chloride (MICRO-K) 10 MEQ CpSR Take 1 capsule (10 mEq total) by mouth 2 (two) times daily.     No current facility-administered medications for this visit.       Vitamins and Supplements:  MVI, micro-K 10meq BID (lasix 40mg BID)    Labs:  Patient confirms she is taking lipitor 40mg for cholesterol control.    Lab Results   Component Value Date    CHOL 128 12/13/2021     Lab Results   Component Value Date    HDL 53 12/13/2021     Lab Results   Component Value Date    LDLCALC 61.4 (L) 12/13/2021     Lab Results   Component Value Date    TRIG 68 12/13/2021     Lab Results   Component Value Date    CHOLHDL 41.4 12/13/2021         Lab Results   Component Value Date    GLUF 96 12/13/2021     No results found for: HGBA1C    Nutrition/Diet History:  Patient eats 3 meals daily.    Seasons food with Mrs. Nagy.  Patient denies use of a salt shaker at the table on prepared foods.   Dines out 1 per week at restaurants such as [employer], [employer].    Chooses fried foods rarely  Chooses fish rarely   Beverages:  water, coffee  Alcohol: none    24 Hour Recall:  · Breakfast: oatmeal, banana, black coffee x 2  · Lunch:turkey with american cheese, miracle whip/mustard on honey wheat bread, veggie chips  · Dinner:roast with boiled potatoes, emma slaw, peach  cobbler  · Other: 3 sandwich cookies    Difficulty Chewing or Swallowing: no  Current Exercise: See Exercise Physiologist Note  Food Safety/Food Preparation: pt has daughters/helper that prepares food and freezes  Living Arrangements/Family Support: Lives with spouse  Cultural/Spiritual/Personal Preferences: not applicable   Barriers to Education: none identified  Stage of Change Related to Diet Habits: Contemplation    Nutrition Diagnosis:  1. Food and nutrition related knowledge deficit related to the lack of prior nutrition education as evidenced by diet history and 24 hour recall    Nutrition Plan:   Goals:  LDL-C < 70 (for high risk patients)  Hgb A1c < 7%  BMI < 25 and abdominal girth < 40M/<35 F  2 gram sodium, Mediterranean diet  Rehab weight goal: Maintenance  Fish intake (non-fried varieties) to a goal of 2-3 servings per week.   Increase fruit and vegetable intake    Interventions/Recommendations:  Lab results reviewed and discussed  Nutrition Prescription:  · Total Energy Estimated Needs: 0879-0941 Kcal/d for weight maintenance  · Method for Estimating Needs: 25-30kcal/kg  · Total Protein Estimated Needs: 35-59g/d  · Method for Estimating Needs: 0.6-1 g/Kg BW (reduced needs r/t dx CKD III)  · Total Fluid Estimated Needs: 1 mL/Kcal  Dietitian Consult: No  Patient to participate in Cardiac Rehab sessions three times a week  Weekly Dietitian Weight Check  Encouraged patient to complete 3 day food diary  Follow Up Plan for Ongoing Self-Management Support    Education:  Mediterranean Diet; verbalizes understanding; Date: 12/23/21   Person taught: patient and daughter   Preferred Learning Method: Verbal and written   Education Needed/Provided: Nutrition counseling and education related to cardiac rehabilitation   Education Method: Weekly nutrition lectures on the Mediterranean diet, cooking, shopping, and dining out   Written Materials Provided: 3 Day Food Record, Introduction to Mediterranean  Diet   Strategies Implemented: Motivational interviewing, Goal setting, Self-Monitoring, and Problem Solving    Comments:   Discussed ways to incorporate healthy snacks, eating on a schedule, and monitoring sodium intake for heart health.  Encouraged increased produce intake and increased hydration    Diabetes  Is the patient diabetic? No      RD contact information provided.      Brandi Quintero MS, RDN/LDN    Session: Orientation Cardiac Rehab Individual Treatment Plan - Initial Assessment      Patient Name: Nelda Rawls MRN: [id number]   : 1935   Age: 86 y.o.   Date of Event: 10/14/2021   Primary Diagnosis: CABG    EF: unknown    Physical Assessment:   There were no vitals taken for this visit.    ASSESSMENT:  Heart Sounds: regular rate and rhythm, S1, S2 normal, no murmur, click, rub or gallop  Prosthetic Valve: Yes  Lung Sounds: normal air entry, lungs clear to auscultation  Capillary Refill: normal  Left Radial Pulse: Normal (+2)  Right Radial Pulse: Normal (+2)  Left Pedal Pulse: Normal (+2)  Right Pedal Pulse: Normal (+2)  Right Edema: none  Left Edema none  Strength: normal  Range of Motion: full range of motion  Existing Limitations:      Site   [] Arthritis, bursitis    [] Amputation, atrophy    [] Other:    []       Diabetic patient's foot examination comments: None -  not diabetic  Incisional site: CABG site healing well, no drainage or redness noted  Special needs: pt currently ambulating with a walker    Psychosocial Assessment:   Outcome Survey Tools:    CATE SCORES:   PRE   Anxiety 1   Depression 0   Somatic 1   Hostility 0     SF-36 SCORES:   PRE   Physical Function 27   Social Function 7   Mental Health 29   Pain 10   Change in Health 2   Physical Role Limitation 1   Mental Role Limitation 3   Energy/Fatigue 12   Health Perceptions 22   Total Score 113     PHQ-9:  PHQ-9 Depression Patient Health Questionnaire 2021   Over the last two weeks how often have you been bothered by little  interest or pleasure in doing things 0   Over the last two weeks how often have you been bothered by feeling down, depressed or hopeless 0   Over the last two weeks how often have you been bothered by trouble falling or staying asleep, or sleeping too much 0   Over the last two weeks how often have you been bothered by feeling tired or having little energy 2   Over the last two weeks how often have you been bothered by a poor appetite or overeating 0   Over the last two weeks how often have you been bothered by feeling bad about yourself - or that you are a failure or have let yourself or your family down 0   Over the last two weeks how often have you been bothered by trouble concentrating on things, such as reading the newspaper or watching television 0   Over the last two weeks how often have you been bothered by moving or speaking so slowly that other people could have noticed. 0   Over the last two weeks how often have you been bothered by thoughts that you would be better off dead, or of hurting yourself 0   If you checked off any problems, how difficult have these problems made it for you to do your work, take care of things at home or get along with other people? Not difficult at all   Total Score 2              Living Arrangements: Lives with spouse  Family Support: children and paid caregiver  Self Reported: Effective Coping Skills and Stress  Displays: happiness and calmness  Medication: pt does not take medications at this time    Psychosocial Plan:   Goals:  Verbalizes coping mechanisms  Reduce manifestation of stress  Maintain positive support system  Maintain positive outlook  Improve overall quality of life    Interventions/Recommendations:  Discussed Results of Surveys  Patient to Self Report Emotional Changes at Session Check In  Recommend Physical Activity  Recommend Attending Education Lectures  Notify MD: No  Program Referral: No  Pharmaceutical Intervention/Therapy: No  Other Needs: not  applicable  Stage of Readiness to Change: Contemplation    Education:  Stress management, verbalizes understanding; Date:12/23/2021  Stress, verbalizes understanding; Date:12/23/2021    Comments:  Pt caring for her , verbalized increased stress with hurricaine. Pt instructed on resources for managing stress verbalized understanding.   Patient has been instructed to notify staff in the event that circumstances worsen.  Patient verbalizes understanding.    Other Core Components/Risk Factors Assessment:   RISK FACTORS:  hyperlipidemia, hypertension, positive family history, sedentary lifestyle, stress    Learning Barriers: None    Education Level:  Unknown    Pre-test Score: 50    Medication Compliance: has been compliant with taking medications    Other Core Components/Risk Factors Plan:   Goals:  Increase exercise tolerance: In Progress  Increase knowledge of CAD: In Progress  Decrease blood pressure: In Progress  Identify and manage personal areas of stress: In Progress  Learn more about healthy eating: In Progress    Interventions/Recommendations:  Recommend regular attendance for Cardiac Rehab: Exercise and Education Lectures  Encourage medication compliance  Individual Education/ Counseling: No  Physician Referral: No    Education:    blood pressure meds, verbalizes understanding; Date: 12/23/2021  cholesterol, verbalizes understanding; Date: 12/23/2021  cholesterol meds, verbalizes understanding; Date: 12/23/2021  hypertension, verbalizes understanding; Date: 12/23/2021  physical activity, verbalizes understanding; Date: 12/23/2021  risk factors, verbalizes understanding; Date: 12/23/2021  stress, verbalizes understanding; Date: 12/23/2021        Education method adapted to patients education level and preferred method of learning.  Method: explanation    Comments:  Pt instructed on risk factors, medication compliance, importance of exercise, benefits of cardiac rehab, reduction of stress and effects on  the body. Pt verbalized understanding.    Other Core Components/Hypertension Assessment:   Resting BP:   BP Readings from Last 1 Encounters:   12/23/21 (!) 172/70         BP Diagnosis: Hypertensive  Patient reported symptoms: none    Other Core Components/Hypertension Plan:   Goals:  Blood Pressure <130/80    Interventions/Recommendations:  Encourage medication compliance  Recommend physical activity  Educate on contributory factors  Reduce stress, anxiety, anger, depression, and/or chronic pain    Education:    Hypertension; verbalizes understanding; Date: 12/23/2021  Coronary Artery Disease; verbalizes understanding; Date: 12/23/2021  Risk Factors; verbalizes understanding; Date: 12/23/2021  Stress; verbalizes understanding; Date: 12/23/2021        Comments:  Pt reports compliance with medications. Pt instructed on taking medications prior to cardiac rehab, monitoring blood pressure, exercise to reduce stress levels; pt verbalized understanding.       Does the patient have Heart Failure? No    Other Core Components/Tobacco Cessation Assessment:   Smoking Status: lifetime non-smoker  Smoking Cessation Barriers: NA  Stage of Readiness to Change: Maintenance    Other Core Components/Tobacco Cessation Plan:   Goals:  Maintain non-smoking status    Interventions:  Maintains non-smoking status    Education:    Benefits of Cardiac Rehab; verbalizes understanding; Date: 12/23/2021        Comments:  Pt nonsmoker lifelong.    Discussed Cardiac Rehab program in depth with patient.  Medication list updated per patient & marked as reviewed.  Patient has been instructed to notify staff of any problems while attending rehab (ie: chest pain, shortness of breath, lightheadedness, dizziness).  Patient has been instructed to monitor blood pressure readings outside of rehab & to keep a daily log of the readings.  Patient verbalizes understanding.    Roberth Mota RN  Cardiac Rehab Nurse

## 2022-02-24 ENCOUNTER — DOCUMENTATION ONLY (OUTPATIENT)
Dept: CARDIAC REHAB | Facility: CLINIC | Age: 87
End: 2022-02-24
Payer: MEDICARE

## 2022-02-24 NOTE — PROGRESS NOTES
Miss Lutz has completed 3 out of 36 exercise session of Phase II cardiac rehab.  A follow up reassessment will be completed at 12 sessions. Currently, Miss Lutz is on hold due to a family emergency.    Session: Orientation   Cardiac Rehab Individual Treatment Plan - Initial Assessment      Patient Name: Nelda Rawls MRN: [id number]   : 1935   Age: 86 y.o.   Primary Diagnosis: CABG  Date of Event: 10-14-21  EF: ?%  Risk Stratification: high  Referring Physician: YE   Exercise Assessment:     CPX/TM Date: 21 Results   RHR 88   Max    Peak VO2 (CPX only) 9.5   Actual METS (CPX only) 2.7   Estimated METS 2.0     Anthropometrics    Height 61 inches   Weight 130 lbs   BMI 24.6   Abdominal Girth 36   Body Composition 27.3%     ST Depression noted on Stress Test?:No  Angina with exercise?: No   Fall Risk: No   Assistive Devices:  walker   Currently exercising? No   There were no limitations noted by the patient.      Exercise Plan:   Goals:  CR Exercise Goals: Attend Cardiac Rehab 3 times/week: In Progress  Home Aerobic Exercise: 2 additional days/week for 30-60 minutes: In Progress  Intensity of 12-15 on the Rate of Perceived Exertion (RPE) scale: In Progress  30% increase in entry estimated METS: 2.6 : In Progress  5 days/week for 30-60 minutes: In Progress    Intervention:   Discussed importance of regular attendance to cardiac rehab class    Exercise Prescription:  THR Range    Mode: Treadmill  Recumbent Bike  Nustep  Elliptical   Frequency:  3 days/week   Duration:  30 - 60 minutes   Intensity:  12 - 15 RPE   Resistance Training:  Yes: 0 to 5 lb weights with 10-15 reps based on strength and range of motion assessment     Home Prescription:  Mode Aerobic   Frequency: 2- 3 days/week   Duration: 30-60 minutes   Resistance Training: None        Education:  Orientation to Equipment; verbalizes understanding; Date: 21  Exercise Recommendations; verbalizes understanding; Date:  21  Exercise Safety; verbalizes understanding; Date: 21  Class Preparation: verbalizes understanding; Date: 21  Signs and symptoms to report: verbalizes understanding; Date: 21  Caffeine/Hydration: verbalizes understanding; Date: 21  Exercise Terminology: verbalizes understanding; Date: 21  Resistance Training: verbalizes understanding; Date: 21    Comments:  Miss Lutz was encouraged to begin thinking about some type of aerobic exercise she can participate in at least 2 non-rehab days per week for at least 30 minutes in addition to attending Phase II cardiac rehab classes 3 days per week.  I suggested she walk around her home 10 minutes 3 times per day but to also work toward that goal slowly.  She stated understanding.    All consent forms were signed, proper attire and shoes were discussed.       Miss Lutz will begin Cardiac Rehab on  at 10:00am.    The exercise prescription will be adjusted based on tolerance of exercise intensity by patient.    Sohan Singh, CEP    Orientation Cardiac Rehab Individual Treatment Plan - Initial Assessment      Patient Name: Nelda Rawls MRN: [id number]   : 1935   Age: 86 y.o.   Primary Diagnosis: s/p CABG, CAD, HTN, HLD, PVD, CHF, complete heart block, + pacemaker, h/o AVR, CKD III    Nutrition Assessment:     Anthropometrics    Height 61 inches   Weight 130 lbs   BMI 24.6   Abdominal Girth 36   Body Composition 27.3       Drug Allergies and Intolerances:  Review of patient's allergies indicates:   Allergen Reactions    Penicillins Hives and Swelling       Food Allergies and Intolerances:  NA    Past Medical History:  No past medical history on file.    Past Surgical History:  Past Surgical History:   Procedure Laterality Date    CORONARY ARTERY BYPASS GRAFT         Medications:  Current Outpatient Medications   Medication Sig    acetaminophen (TYLENOL) 325 MG tablet Take 325 mg by mouth as needed for Pain.     acetaminophen-codeine 300-15mg (TYLENOL #2) 300-15 mg per tablet Take 1 tablet by mouth every 4 (four) hours as needed for Pain.    aspirin 325 MG tablet Take 1 tablet (325 mg total) by mouth once daily.    atorvastatin (LIPITOR) 40 MG tablet Take 1 tablet (40 mg total) by mouth once daily.    furosemide (LASIX) 40 MG tablet Take 1 tablet (40 mg total) by mouth 2 (two) times daily.    loratadine (CLARITIN) 10 mg tablet Take 10 mg by mouth once daily.    metoprolol tartrate (LOPRESSOR) 25 MG tablet Take 1 tablet (25 mg total) by mouth Daily. Takes 0.5mg BID    multivitamin (THERAGRAN) per tablet Take 1 tablet by mouth once daily.    potassium chloride (MICRO-K) 10 MEQ CpSR Take 1 capsule (10 mEq total) by mouth 2 (two) times daily.     No current facility-administered medications for this visit.       Vitamins and Supplements:  MVI, micro-K 10meq BID (lasix 40mg BID)    Labs:  Patient confirms she is taking lipitor 40mg for cholesterol control.    Lab Results   Component Value Date    CHOL 128 12/13/2021     Lab Results   Component Value Date    HDL 53 12/13/2021     Lab Results   Component Value Date    LDLCALC 61.4 (L) 12/13/2021     Lab Results   Component Value Date    TRIG 68 12/13/2021     Lab Results   Component Value Date    CHOLHDL 41.4 12/13/2021         Lab Results   Component Value Date    GLUF 96 12/13/2021     No results found for: HGBA1C    Nutrition/Diet History:  Patient eats 3 meals daily.    Seasons food with Mrs. Nagy.  Patient denies use of a salt shaker at the table on prepared foods.   Dines out 1 per week at restaurants such as [employer], [employer].    Chooses fried foods rarely  Chooses fish rarely   Beverages:  water, coffee  Alcohol: none    24 Hour Recall:  · Breakfast: oatmeal, banana, black coffee x 2  · Lunch:turkey with american cheese, miracle whip/mustard on honey wheat bread, veggie chips  · Dinner:roast with boiled potatoes, emma slaw, peach  cobbler  · Other: 3 sandwich cookies    Difficulty Chewing or Swallowing: no  Current Exercise: See Exercise Physiologist Note  Food Safety/Food Preparation: pt has daughters/helper that prepares food and freezes  Living Arrangements/Family Support: Lives with spouse  Cultural/Spiritual/Personal Preferences: not applicable   Barriers to Education: none identified  Stage of Change Related to Diet Habits: Contemplation    Nutrition Diagnosis:  1. Food and nutrition related knowledge deficit related to the lack of prior nutrition education as evidenced by diet history and 24 hour recall    Nutrition Plan:   Goals:  LDL-C < 70 (for high risk patients)  Hgb A1c < 7%  BMI < 25 and abdominal girth < 40M/<35 F  2 gram sodium, Mediterranean diet  Rehab weight goal: Maintenance  Fish intake (non-fried varieties) to a goal of 2-3 servings per week.   Increase fruit and vegetable intake    Interventions/Recommendations:  Lab results reviewed and discussed  Nutrition Prescription:  · Total Energy Estimated Needs: 0058-1456 Kcal/d for weight maintenance  · Method for Estimating Needs: 25-30kcal/kg  · Total Protein Estimated Needs: 35-59g/d  · Method for Estimating Needs: 0.6-1 g/Kg BW (reduced needs r/t dx CKD III)  · Total Fluid Estimated Needs: 1 mL/Kcal  Dietitian Consult: No  Patient to participate in Cardiac Rehab sessions three times a week  Weekly Dietitian Weight Check  Encouraged patient to complete 3 day food diary  Follow Up Plan for Ongoing Self-Management Support    Education:  Mediterranean Diet; verbalizes understanding; Date: 12/23/21   Person taught: patient and daughter   Preferred Learning Method: Verbal and written   Education Needed/Provided: Nutrition counseling and education related to cardiac rehabilitation   Education Method: Weekly nutrition lectures on the Mediterranean diet, cooking, shopping, and dining out   Written Materials Provided: 3 Day Food Record, Introduction to Mediterranean  Diet   Strategies Implemented: Motivational interviewing, Goal setting, Self-Monitoring, and Problem Solving    Comments:   Discussed ways to incorporate healthy snacks, eating on a schedule, and monitoring sodium intake for heart health.  Encouraged increased produce intake and increased hydration    Diabetes  Is the patient diabetic? No      RD contact information provided.      Brandi Quintero MS, RDN/LDN    Session: Orientation Cardiac Rehab Individual Treatment Plan - Initial Assessment      Patient Name: Nelda Rawls MRN: [id number]   : 1935   Age: 86 y.o.   Date of Event: 10/14/2021   Primary Diagnosis: CABG    EF: unknown    Physical Assessment:   There were no vitals taken for this visit.    ASSESSMENT:  Heart Sounds: regular rate and rhythm, S1, S2 normal, no murmur, click, rub or gallop  Prosthetic Valve: Yes  Lung Sounds: normal air entry, lungs clear to auscultation  Capillary Refill: normal  Left Radial Pulse: Normal (+2)  Right Radial Pulse: Normal (+2)  Left Pedal Pulse: Normal (+2)  Right Pedal Pulse: Normal (+2)  Right Edema: none  Left Edema none  Strength: normal  Range of Motion: full range of motion  Existing Limitations:      Site   [] Arthritis, bursitis    [] Amputation, atrophy    [] Other:    []       Diabetic patient's foot examination comments: None -  not diabetic  Incisional site: CABG site healing well, no drainage or redness noted  Special needs: pt currently ambulating with a walker    Psychosocial Assessment:   Outcome Survey Tools:    CATE SCORES:   PRE   Anxiety 1   Depression 0   Somatic 1   Hostility 0     SF-36 SCORES:   PRE   Physical Function 27   Social Function 7   Mental Health 29   Pain 10   Change in Health 2   Physical Role Limitation 1   Mental Role Limitation 3   Energy/Fatigue 12   Health Perceptions 22   Total Score 113     PHQ-9:  PHQ-9 Depression Patient Health Questionnaire 2021   Over the last two weeks how often have you been bothered by little  interest or pleasure in doing things 0   Over the last two weeks how often have you been bothered by feeling down, depressed or hopeless 0   Over the last two weeks how often have you been bothered by trouble falling or staying asleep, or sleeping too much 0   Over the last two weeks how often have you been bothered by feeling tired or having little energy 2   Over the last two weeks how often have you been bothered by a poor appetite or overeating 0   Over the last two weeks how often have you been bothered by feeling bad about yourself - or that you are a failure or have let yourself or your family down 0   Over the last two weeks how often have you been bothered by trouble concentrating on things, such as reading the newspaper or watching television 0   Over the last two weeks how often have you been bothered by moving or speaking so slowly that other people could have noticed. 0   Over the last two weeks how often have you been bothered by thoughts that you would be better off dead, or of hurting yourself 0   If you checked off any problems, how difficult have these problems made it for you to do your work, take care of things at home or get along with other people? Not difficult at all   Total Score 2              Living Arrangements: Lives with spouse  Family Support: children and paid caregiver  Self Reported: Effective Coping Skills and Stress  Displays: happiness and calmness  Medication: pt does not take medications at this time    Psychosocial Plan:   Goals:  Verbalizes coping mechanisms  Reduce manifestation of stress  Maintain positive support system  Maintain positive outlook  Improve overall quality of life    Interventions/Recommendations:  Discussed Results of Surveys  Patient to Self Report Emotional Changes at Session Check In  Recommend Physical Activity  Recommend Attending Education Lectures  Notify MD: No  Program Referral: No  Pharmaceutical Intervention/Therapy: No  Other Needs: not  applicable  Stage of Readiness to Change: Contemplation    Education:  Stress management, verbalizes understanding; Date:12/23/2021  Stress, verbalizes understanding; Date:12/23/2021    Comments:  Pt caring for her , verbalized increased stress with hurricaine. Pt instructed on resources for managing stress verbalized understanding.   Patient has been instructed to notify staff in the event that circumstances worsen.  Patient verbalizes understanding.    Other Core Components/Risk Factors Assessment:   RISK FACTORS:  hyperlipidemia, hypertension, positive family history, sedentary lifestyle, stress    Learning Barriers: None    Education Level:  Unknown    Pre-test Score: 50    Medication Compliance: has been compliant with taking medications    Other Core Components/Risk Factors Plan:   Goals:  Increase exercise tolerance: In Progress  Increase knowledge of CAD: In Progress  Decrease blood pressure: In Progress  Identify and manage personal areas of stress: In Progress  Learn more about healthy eating: In Progress    Interventions/Recommendations:  Recommend regular attendance for Cardiac Rehab: Exercise and Education Lectures  Encourage medication compliance  Individual Education/ Counseling: No  Physician Referral: No    Education:    blood pressure meds, verbalizes understanding; Date: 12/23/2021  cholesterol, verbalizes understanding; Date: 12/23/2021  cholesterol meds, verbalizes understanding; Date: 12/23/2021  hypertension, verbalizes understanding; Date: 12/23/2021  physical activity, verbalizes understanding; Date: 12/23/2021  risk factors, verbalizes understanding; Date: 12/23/2021  stress, verbalizes understanding; Date: 12/23/2021        Education method adapted to patients education level and preferred method of learning.  Method: explanation    Comments:  Pt instructed on risk factors, medication compliance, importance of exercise, benefits of cardiac rehab, reduction of stress and effects on  the body. Pt verbalized understanding.    Other Core Components/Hypertension Assessment:   Resting BP:   BP Readings from Last 1 Encounters:   12/23/21 (!) 172/70         BP Diagnosis: Hypertensive  Patient reported symptoms: none    Other Core Components/Hypertension Plan:   Goals:  Blood Pressure <130/80    Interventions/Recommendations:  Encourage medication compliance  Recommend physical activity  Educate on contributory factors  Reduce stress, anxiety, anger, depression, and/or chronic pain    Education:    Hypertension; verbalizes understanding; Date: 12/23/2021  Coronary Artery Disease; verbalizes understanding; Date: 12/23/2021  Risk Factors; verbalizes understanding; Date: 12/23/2021  Stress; verbalizes understanding; Date: 12/23/2021        Comments:  Pt reports compliance with medications. Pt instructed on taking medications prior to cardiac rehab, monitoring blood pressure, exercise to reduce stress levels; pt verbalized understanding.       Does the patient have Heart Failure? No    Other Core Components/Tobacco Cessation Assessment:   Smoking Status: lifetime non-smoker  Smoking Cessation Barriers: NA  Stage of Readiness to Change: Maintenance    Other Core Components/Tobacco Cessation Plan:   Goals:  Maintain non-smoking status    Interventions:  Maintains non-smoking status    Education:    Benefits of Cardiac Rehab; verbalizes understanding; Date: 12/23/2021        Comments:  Pt nonsmoker lifelong.    Discussed Cardiac Rehab program in depth with patient.  Medication list updated per patient & marked as reviewed.  Patient has been instructed to notify staff of any problems while attending rehab (ie: chest pain, shortness of breath, lightheadedness, dizziness).  Patient has been instructed to monitor blood pressure readings outside of rehab & to keep a daily log of the readings.  Patient verbalizes understanding.    Roberth Mota RN  Cardiac Rehab Nurse

## 2022-03-17 ENCOUNTER — DOCUMENTATION ONLY (OUTPATIENT)
Dept: CARDIAC REHAB | Facility: CLINIC | Age: 87
End: 2022-03-17
Payer: MEDICARE

## 2022-03-17 NOTE — PROGRESS NOTES
Miss Lutz has completed 3 out of 36 exercise session of Phase II cardiac rehab.  A follow up reassessment will be completed at 12 sessions. Currently, Miss Lutz is on hold due to a family emergency.    Session: Orientation   Cardiac Rehab Individual Treatment Plan - Initial Assessment      Patient Name: Nelda Rawls MRN: [id number]   : 1935   Age: 86 y.o.   Primary Diagnosis: CABG  Date of Event: 10-14-21  EF: ?%  Risk Stratification: high  Referring Physician: YE   Exercise Assessment:     CPX/TM Date: 21 Results   RHR 88   Max    Peak VO2 (CPX only) 9.5   Actual METS (CPX only) 2.7   Estimated METS 2.0     Anthropometrics    Height 61 inches   Weight 130 lbs   BMI 24.6   Abdominal Girth 36   Body Composition 27.3%     ST Depression noted on Stress Test?:No  Angina with exercise?: No   Fall Risk: No   Assistive Devices:  walker   Currently exercising? No   There were no limitations noted by the patient.      Exercise Plan:   Goals:  CR Exercise Goals: Attend Cardiac Rehab 3 times/week: In Progress  Home Aerobic Exercise: 2 additional days/week for 30-60 minutes: In Progress  Intensity of 12-15 on the Rate of Perceived Exertion (RPE) scale: In Progress  30% increase in entry estimated METS: 2.6 : In Progress  5 days/week for 30-60 minutes: In Progress    Intervention:   Discussed importance of regular attendance to cardiac rehab class    Exercise Prescription:  THR Range    Mode: Treadmill  Recumbent Bike  Nustep  Elliptical   Frequency:  3 days/week   Duration:  30 - 60 minutes   Intensity:  12 - 15 RPE   Resistance Training:  Yes: 0 to 5 lb weights with 10-15 reps based on strength and range of motion assessment     Home Prescription:  Mode Aerobic   Frequency: 2- 3 days/week   Duration: 30-60 minutes   Resistance Training: None        Education:  Orientation to Equipment; verbalizes understanding; Date: 21  Exercise Recommendations; verbalizes understanding; Date:  21  Exercise Safety; verbalizes understanding; Date: 21  Class Preparation: verbalizes understanding; Date: 21  Signs and symptoms to report: verbalizes understanding; Date: 21  Caffeine/Hydration: verbalizes understanding; Date: 21  Exercise Terminology: verbalizes understanding; Date: 21  Resistance Training: verbalizes understanding; Date: 21    Comments:  Miss Lutz was encouraged to begin thinking about some type of aerobic exercise she can participate in at least 2 non-rehab days per week for at least 30 minutes in addition to attending Phase II cardiac rehab classes 3 days per week.  I suggested she walk around her home 10 minutes 3 times per day but to also work toward that goal slowly.  She stated understanding.    All consent forms were signed, proper attire and shoes were discussed.       Miss Lutz will begin Cardiac Rehab on  at 10:00am.    The exercise prescription will be adjusted based on tolerance of exercise intensity by patient.    Sohan Singh, CEP    Orientation Cardiac Rehab Individual Treatment Plan - Initial Assessment      Patient Name: Nelda Rawls MRN: [id number]   : 1935   Age: 86 y.o.   Primary Diagnosis: s/p CABG, CAD, HTN, HLD, PVD, CHF, complete heart block, + pacemaker, h/o AVR, CKD III    Nutrition Assessment:     Anthropometrics    Height 61 inches   Weight 130 lbs   BMI 24.6   Abdominal Girth 36   Body Composition 27.3       Drug Allergies and Intolerances:  Review of patient's allergies indicates:   Allergen Reactions    Penicillins Hives and Swelling       Food Allergies and Intolerances:  NA    Past Medical History:  No past medical history on file.    Past Surgical History:  Past Surgical History:   Procedure Laterality Date    CORONARY ARTERY BYPASS GRAFT         Medications:  Current Outpatient Medications   Medication Sig    acetaminophen (TYLENOL) 325 MG tablet Take 325 mg by mouth as needed for Pain.     acetaminophen-codeine 300-15mg (TYLENOL #2) 300-15 mg per tablet Take 1 tablet by mouth every 4 (four) hours as needed for Pain.    aspirin 325 MG tablet Take 1 tablet (325 mg total) by mouth once daily.    atorvastatin (LIPITOR) 40 MG tablet Take 1 tablet (40 mg total) by mouth once daily.    furosemide (LASIX) 40 MG tablet Take 1 tablet (40 mg total) by mouth 2 (two) times daily.    loratadine (CLARITIN) 10 mg tablet Take 10 mg by mouth once daily.    metoprolol tartrate (LOPRESSOR) 25 MG tablet Take 1 tablet (25 mg total) by mouth Daily. Takes 0.5mg BID    multivitamin (THERAGRAN) per tablet Take 1 tablet by mouth once daily.    potassium chloride (MICRO-K) 10 MEQ CpSR Take 1 capsule (10 mEq total) by mouth 2 (two) times daily.     No current facility-administered medications for this visit.       Vitamins and Supplements:  MVI, micro-K 10meq BID (lasix 40mg BID)    Labs:  Patient confirms she is taking lipitor 40mg for cholesterol control.    Lab Results   Component Value Date    CHOL 128 12/13/2021     Lab Results   Component Value Date    HDL 53 12/13/2021     Lab Results   Component Value Date    LDLCALC 61.4 (L) 12/13/2021     Lab Results   Component Value Date    TRIG 68 12/13/2021     Lab Results   Component Value Date    CHOLHDL 41.4 12/13/2021         Lab Results   Component Value Date    GLUF 96 12/13/2021     No results found for: HGBA1C    Nutrition/Diet History:  Patient eats 3 meals daily.    Seasons food with Mrs. Nagy.  Patient denies use of a salt shaker at the table on prepared foods.   Dines out 1 per week at restaurants such as [employer], [employer].    Chooses fried foods rarely  Chooses fish rarely   Beverages:  water, coffee  Alcohol: none    24 Hour Recall:  · Breakfast: oatmeal, banana, black coffee x 2  · Lunch:turkey with american cheese, miracle whip/mustard on honey wheat bread, veggie chips  · Dinner:roast with boiled potatoes, emma slaw, peach  cobbler  · Other: 3 sandwich cookies    Difficulty Chewing or Swallowing: no  Current Exercise: See Exercise Physiologist Note  Food Safety/Food Preparation: pt has daughters/helper that prepares food and freezes  Living Arrangements/Family Support: Lives with spouse  Cultural/Spiritual/Personal Preferences: not applicable   Barriers to Education: none identified  Stage of Change Related to Diet Habits: Contemplation    Nutrition Diagnosis:  1. Food and nutrition related knowledge deficit related to the lack of prior nutrition education as evidenced by diet history and 24 hour recall    Nutrition Plan:   Goals:  LDL-C < 70 (for high risk patients)  Hgb A1c < 7%  BMI < 25 and abdominal girth < 40M/<35 F  2 gram sodium, Mediterranean diet  Rehab weight goal: Maintenance  Fish intake (non-fried varieties) to a goal of 2-3 servings per week.   Increase fruit and vegetable intake    Interventions/Recommendations:  Lab results reviewed and discussed  Nutrition Prescription:  · Total Energy Estimated Needs: 2281-8083 Kcal/d for weight maintenance  · Method for Estimating Needs: 25-30kcal/kg  · Total Protein Estimated Needs: 35-59g/d  · Method for Estimating Needs: 0.6-1 g/Kg BW (reduced needs r/t dx CKD III)  · Total Fluid Estimated Needs: 1 mL/Kcal  Dietitian Consult: No  Patient to participate in Cardiac Rehab sessions three times a week  Weekly Dietitian Weight Check  Encouraged patient to complete 3 day food diary  Follow Up Plan for Ongoing Self-Management Support    Education:  Mediterranean Diet; verbalizes understanding; Date: 12/23/21   Person taught: patient and daughter   Preferred Learning Method: Verbal and written   Education Needed/Provided: Nutrition counseling and education related to cardiac rehabilitation   Education Method: Weekly nutrition lectures on the Mediterranean diet, cooking, shopping, and dining out   Written Materials Provided: 3 Day Food Record, Introduction to Mediterranean  Diet   Strategies Implemented: Motivational interviewing, Goal setting, Self-Monitoring, and Problem Solving    Comments:   Discussed ways to incorporate healthy snacks, eating on a schedule, and monitoring sodium intake for heart health.  Encouraged increased produce intake and increased hydration    Diabetes  Is the patient diabetic? No      RD contact information provided.      Brandi Quintero MS, RDN/LDN    Session: Orientation Cardiac Rehab Individual Treatment Plan - Initial Assessment      Patient Name: Nelda Rawls MRN: [id number]   : 1935   Age: 86 y.o.   Date of Event: 10/14/2021   Primary Diagnosis: CABG    EF: unknown    Physical Assessment:   There were no vitals taken for this visit.    ASSESSMENT:  Heart Sounds: regular rate and rhythm, S1, S2 normal, no murmur, click, rub or gallop  Prosthetic Valve: Yes  Lung Sounds: normal air entry, lungs clear to auscultation  Capillary Refill: normal  Left Radial Pulse: Normal (+2)  Right Radial Pulse: Normal (+2)  Left Pedal Pulse: Normal (+2)  Right Pedal Pulse: Normal (+2)  Right Edema: none  Left Edema none  Strength: normal  Range of Motion: full range of motion  Existing Limitations:      Site   [] Arthritis, bursitis    [] Amputation, atrophy    [] Other:    []       Diabetic patient's foot examination comments: None -  not diabetic  Incisional site: CABG site healing well, no drainage or redness noted  Special needs: pt currently ambulating with a walker    Psychosocial Assessment:   Outcome Survey Tools:    CATE SCORES:   PRE   Anxiety 1   Depression 0   Somatic 1   Hostility 0     SF-36 SCORES:   PRE   Physical Function 27   Social Function 7   Mental Health 29   Pain 10   Change in Health 2   Physical Role Limitation 1   Mental Role Limitation 3   Energy/Fatigue 12   Health Perceptions 22   Total Score 113     PHQ-9:  PHQ-9 Depression Patient Health Questionnaire 2021   Over the last two weeks how often have you been bothered by little  interest or pleasure in doing things 0   Over the last two weeks how often have you been bothered by feeling down, depressed or hopeless 0   Over the last two weeks how often have you been bothered by trouble falling or staying asleep, or sleeping too much 0   Over the last two weeks how often have you been bothered by feeling tired or having little energy 2   Over the last two weeks how often have you been bothered by a poor appetite or overeating 0   Over the last two weeks how often have you been bothered by feeling bad about yourself - or that you are a failure or have let yourself or your family down 0   Over the last two weeks how often have you been bothered by trouble concentrating on things, such as reading the newspaper or watching television 0   Over the last two weeks how often have you been bothered by moving or speaking so slowly that other people could have noticed. 0   Over the last two weeks how often have you been bothered by thoughts that you would be better off dead, or of hurting yourself 0   If you checked off any problems, how difficult have these problems made it for you to do your work, take care of things at home or get along with other people? Not difficult at all   Total Score 2              Living Arrangements: Lives with spouse  Family Support: children and paid caregiver  Self Reported: Effective Coping Skills and Stress  Displays: happiness and calmness  Medication: pt does not take medications at this time    Psychosocial Plan:   Goals:  Verbalizes coping mechanisms  Reduce manifestation of stress  Maintain positive support system  Maintain positive outlook  Improve overall quality of life    Interventions/Recommendations:  Discussed Results of Surveys  Patient to Self Report Emotional Changes at Session Check In  Recommend Physical Activity  Recommend Attending Education Lectures  Notify MD: No  Program Referral: No  Pharmaceutical Intervention/Therapy: No  Other Needs: not  applicable  Stage of Readiness to Change: Contemplation    Education:  Stress management, verbalizes understanding; Date:12/23/2021  Stress, verbalizes understanding; Date:12/23/2021    Comments:  Pt caring for her , verbalized increased stress with hurricaine. Pt instructed on resources for managing stress verbalized understanding.   Patient has been instructed to notify staff in the event that circumstances worsen.  Patient verbalizes understanding.    Other Core Components/Risk Factors Assessment:   RISK FACTORS:  hyperlipidemia, hypertension, positive family history, sedentary lifestyle, stress    Learning Barriers: None    Education Level:  Unknown    Pre-test Score: 50    Medication Compliance: has been compliant with taking medications    Other Core Components/Risk Factors Plan:   Goals:  Increase exercise tolerance: In Progress  Increase knowledge of CAD: In Progress  Decrease blood pressure: In Progress  Identify and manage personal areas of stress: In Progress  Learn more about healthy eating: In Progress    Interventions/Recommendations:  Recommend regular attendance for Cardiac Rehab: Exercise and Education Lectures  Encourage medication compliance  Individual Education/ Counseling: No  Physician Referral: No    Education:    blood pressure meds, verbalizes understanding; Date: 12/23/2021  cholesterol, verbalizes understanding; Date: 12/23/2021  cholesterol meds, verbalizes understanding; Date: 12/23/2021  hypertension, verbalizes understanding; Date: 12/23/2021  physical activity, verbalizes understanding; Date: 12/23/2021  risk factors, verbalizes understanding; Date: 12/23/2021  stress, verbalizes understanding; Date: 12/23/2021        Education method adapted to patients education level and preferred method of learning.  Method: explanation    Comments:  Pt instructed on risk factors, medication compliance, importance of exercise, benefits of cardiac rehab, reduction of stress and effects on  the body. Pt verbalized understanding.    Other Core Components/Hypertension Assessment:   Resting BP:   BP Readings from Last 1 Encounters:   12/23/21 (!) 172/70         BP Diagnosis: Hypertensive  Patient reported symptoms: none    Other Core Components/Hypertension Plan:   Goals:  Blood Pressure <130/80    Interventions/Recommendations:  Encourage medication compliance  Recommend physical activity  Educate on contributory factors  Reduce stress, anxiety, anger, depression, and/or chronic pain    Education:    Hypertension; verbalizes understanding; Date: 12/23/2021  Coronary Artery Disease; verbalizes understanding; Date: 12/23/2021  Risk Factors; verbalizes understanding; Date: 12/23/2021  Stress; verbalizes understanding; Date: 12/23/2021        Comments:  Pt reports compliance with medications. Pt instructed on taking medications prior to cardiac rehab, monitoring blood pressure, exercise to reduce stress levels; pt verbalized understanding.       Does the patient have Heart Failure? No    Other Core Components/Tobacco Cessation Assessment:   Smoking Status: lifetime non-smoker  Smoking Cessation Barriers: NA  Stage of Readiness to Change: Maintenance    Other Core Components/Tobacco Cessation Plan:   Goals:  Maintain non-smoking status    Interventions:  Maintains non-smoking status    Education:    Benefits of Cardiac Rehab; verbalizes understanding; Date: 12/23/2021        Comments:  Pt nonsmoker lifelong.    Discussed Cardiac Rehab program in depth with patient.  Medication list updated per patient & marked as reviewed.  Patient has been instructed to notify staff of any problems while attending rehab (ie: chest pain, shortness of breath, lightheadedness, dizziness).  Patient has been instructed to monitor blood pressure readings outside of rehab & to keep a daily log of the readings.  Patient verbalizes understanding.    Roberth Mota RN  Cardiac Rehab Nurse

## 2022-03-29 ENCOUNTER — TELEPHONE (OUTPATIENT)
Dept: CARDIOLOGY | Facility: CLINIC | Age: 87
End: 2022-03-29
Payer: MEDICARE

## 2022-03-29 NOTE — TELEPHONE ENCOUNTER
----- Message from Kwame Devine sent at 3/29/2022 11:26 AM CDT -----  Contact: pt.  .Type:  Sooner Apoointment Request    Caller is requesting a sooner appointment.  Caller declined first available appointment listed below.  Caller will not accept being placed on the waitlist and is requesting a message be sent to doctor.  Name of Caller pt. Daughter Kalyani  When is the first available appointment? 04/18/2022  Symptoms: 4 month f/u  Would the patient rather a call back or a response via [facility]? Call back  Best Call Back Number: [phone]  Additional Information: Pt. Daughter would like to be at the appt. With  her mother she lives out of town and would like to know if the appt can be moved to April 14 or 15, 2022.  She will be in town those days.

## 2022-04-07 ENCOUNTER — DOCUMENTATION ONLY (OUTPATIENT)
Dept: CARDIAC REHAB | Facility: CLINIC | Age: 87
End: 2022-04-07
Payer: MEDICARE

## 2022-04-07 NOTE — PROGRESS NOTES
Miss Lutz has completed 3 out of 36 exercise session of Phase II cardiac rehab.  A follow up reassessment will be completed at 12 sessions. Currently, Miss Lutz is on hold due to a family emergency.    Session: Orientation   Cardiac Rehab Individual Treatment Plan - Initial Assessment      Patient Name: Nelda Rawls MRN: [id number]   : 1935   Age: 86 y.o.   Primary Diagnosis: CABG  Date of Event: 10-14-21  EF: ?%  Risk Stratification: high  Referring Physician: YE   Exercise Assessment:     CPX/TM Date: 21 Results   RHR 88   Max    Peak VO2 (CPX only) 9.5   Actual METS (CPX only) 2.7   Estimated METS 2.0     Anthropometrics    Height 61 inches   Weight 130 lbs   BMI 24.6   Abdominal Girth 36   Body Composition 27.3%     ST Depression noted on Stress Test?:No  Angina with exercise?: No   Fall Risk: No   Assistive Devices:  walker   Currently exercising? No   There were no limitations noted by the patient.      Exercise Plan:   Goals:  CR Exercise Goals: Attend Cardiac Rehab 3 times/week: In Progress  Home Aerobic Exercise: 2 additional days/week for 30-60 minutes: In Progress  Intensity of 12-15 on the Rate of Perceived Exertion (RPE) scale: In Progress  30% increase in entry estimated METS: 2.6 : In Progress  5 days/week for 30-60 minutes: In Progress    Intervention:   Discussed importance of regular attendance to cardiac rehab class    Exercise Prescription:  THR Range    Mode: Treadmill  Recumbent Bike  Nustep  Elliptical   Frequency:  3 days/week   Duration:  30 - 60 minutes   Intensity:  12 - 15 RPE   Resistance Training:  Yes: 0 to 5 lb weights with 10-15 reps based on strength and range of motion assessment     Home Prescription:  Mode Aerobic   Frequency: 2- 3 days/week   Duration: 30-60 minutes   Resistance Training: None        Education:  Orientation to Equipment; verbalizes understanding; Date: 21  Exercise Recommendations; verbalizes understanding; Date:  21  Exercise Safety; verbalizes understanding; Date: 21  Class Preparation: verbalizes understanding; Date: 21  Signs and symptoms to report: verbalizes understanding; Date: 21  Caffeine/Hydration: verbalizes understanding; Date: 21  Exercise Terminology: verbalizes understanding; Date: 21  Resistance Training: verbalizes understanding; Date: 21    Comments:  Miss Lutz was encouraged to begin thinking about some type of aerobic exercise she can participate in at least 2 non-rehab days per week for at least 30 minutes in addition to attending Phase II cardiac rehab classes 3 days per week.  I suggested she walk around her home 10 minutes 3 times per day but to also work toward that goal slowly.  She stated understanding.    All consent forms were signed, proper attire and shoes were discussed.       Miss Lutz will begin Cardiac Rehab on  at 10:00am.    The exercise prescription will be adjusted based on tolerance of exercise intensity by patient.    Sohan Singh, CEP    Orientation Cardiac Rehab Individual Treatment Plan - Initial Assessment      Patient Name: Nelda Rawls MRN: [id number]   : 1935   Age: 86 y.o.   Primary Diagnosis: s/p CABG, CAD, HTN, HLD, PVD, CHF, complete heart block, + pacemaker, h/o AVR, CKD III    Nutrition Assessment:     Anthropometrics    Height 61 inches   Weight 130 lbs   BMI 24.6   Abdominal Girth 36   Body Composition 27.3       Drug Allergies and Intolerances:  Review of patient's allergies indicates:   Allergen Reactions    Penicillins Hives and Swelling       Food Allergies and Intolerances:  NA    Past Medical History:  No past medical history on file.    Past Surgical History:  Past Surgical History:   Procedure Laterality Date    CORONARY ARTERY BYPASS GRAFT         Medications:  Current Outpatient Medications   Medication Sig    acetaminophen (TYLENOL) 325 MG tablet Take 325 mg by mouth as needed for Pain.     acetaminophen-codeine 300-15mg (TYLENOL #2) 300-15 mg per tablet Take 1 tablet by mouth every 4 (four) hours as needed for Pain.    aspirin 325 MG tablet Take 1 tablet (325 mg total) by mouth once daily.    atorvastatin (LIPITOR) 40 MG tablet Take 1 tablet (40 mg total) by mouth once daily.    furosemide (LASIX) 40 MG tablet Take 1 tablet (40 mg total) by mouth 2 (two) times daily.    loratadine (CLARITIN) 10 mg tablet Take 10 mg by mouth once daily.    metoprolol tartrate (LOPRESSOR) 25 MG tablet Take 1 tablet (25 mg total) by mouth Daily. Takes 0.5mg BID    multivitamin (THERAGRAN) per tablet Take 1 tablet by mouth once daily.    potassium chloride (MICRO-K) 10 MEQ CpSR Take 1 capsule (10 mEq total) by mouth 2 (two) times daily.     No current facility-administered medications for this visit.       Vitamins and Supplements:  MVI, micro-K 10meq BID (lasix 40mg BID)    Labs:  Patient confirms she is taking lipitor 40mg for cholesterol control.    Lab Results   Component Value Date    CHOL 128 12/13/2021     Lab Results   Component Value Date    HDL 53 12/13/2021     Lab Results   Component Value Date    LDLCALC 61.4 (L) 12/13/2021     Lab Results   Component Value Date    TRIG 68 12/13/2021     Lab Results   Component Value Date    CHOLHDL 41.4 12/13/2021         Lab Results   Component Value Date    GLUF 96 12/13/2021     No results found for: HGBA1C    Nutrition/Diet History:  Patient eats 3 meals daily.    Seasons food with Mrs. Nagy.  Patient denies use of a salt shaker at the table on prepared foods.   Dines out 1 per week at restaurants such as [employer], [employer].    Chooses fried foods rarely  Chooses fish rarely   Beverages:  water, coffee  Alcohol: none    24 Hour Recall:  · Breakfast: oatmeal, banana, black coffee x 2  · Lunch:turkey with american cheese, miracle whip/mustard on honey wheat bread, veggie chips  · Dinner:roast with boiled potatoes, emma slaw, peach  cobbler  · Other: 3 sandwich cookies    Difficulty Chewing or Swallowing: no  Current Exercise: See Exercise Physiologist Note  Food Safety/Food Preparation: pt has daughters/helper that prepares food and freezes  Living Arrangements/Family Support: Lives with spouse  Cultural/Spiritual/Personal Preferences: not applicable   Barriers to Education: none identified  Stage of Change Related to Diet Habits: Contemplation    Nutrition Diagnosis:  1. Food and nutrition related knowledge deficit related to the lack of prior nutrition education as evidenced by diet history and 24 hour recall    Nutrition Plan:   Goals:  LDL-C < 70 (for high risk patients)  Hgb A1c < 7%  BMI < 25 and abdominal girth < 40M/<35 F  2 gram sodium, Mediterranean diet  Rehab weight goal: Maintenance  Fish intake (non-fried varieties) to a goal of 2-3 servings per week.   Increase fruit and vegetable intake    Interventions/Recommendations:  Lab results reviewed and discussed  Nutrition Prescription:  · Total Energy Estimated Needs: 0066-8299 Kcal/d for weight maintenance  · Method for Estimating Needs: 25-30kcal/kg  · Total Protein Estimated Needs: 35-59g/d  · Method for Estimating Needs: 0.6-1 g/Kg BW (reduced needs r/t dx CKD III)  · Total Fluid Estimated Needs: 1 mL/Kcal  Dietitian Consult: No  Patient to participate in Cardiac Rehab sessions three times a week  Weekly Dietitian Weight Check  Encouraged patient to complete 3 day food diary  Follow Up Plan for Ongoing Self-Management Support    Education:  Mediterranean Diet; verbalizes understanding; Date: 12/23/21   Person taught: patient and daughter   Preferred Learning Method: Verbal and written   Education Needed/Provided: Nutrition counseling and education related to cardiac rehabilitation   Education Method: Weekly nutrition lectures on the Mediterranean diet, cooking, shopping, and dining out   Written Materials Provided: 3 Day Food Record, Introduction to Mediterranean  Diet   Strategies Implemented: Motivational interviewing, Goal setting, Self-Monitoring, and Problem Solving    Comments:   Discussed ways to incorporate healthy snacks, eating on a schedule, and monitoring sodium intake for heart health.  Encouraged increased produce intake and increased hydration    Diabetes  Is the patient diabetic? No      RD contact information provided.      Brandi Quintero MS, RDN/LDN    Session: Orientation Cardiac Rehab Individual Treatment Plan - Initial Assessment      Patient Name: Nelda Rawls MRN: [id number]   : 1935   Age: 86 y.o.   Date of Event: 10/14/2021   Primary Diagnosis: CABG    EF: unknown    Physical Assessment:   There were no vitals taken for this visit.    ASSESSMENT:  Heart Sounds: regular rate and rhythm, S1, S2 normal, no murmur, click, rub or gallop  Prosthetic Valve: Yes  Lung Sounds: normal air entry, lungs clear to auscultation  Capillary Refill: normal  Left Radial Pulse: Normal (+2)  Right Radial Pulse: Normal (+2)  Left Pedal Pulse: Normal (+2)  Right Pedal Pulse: Normal (+2)  Right Edema: none  Left Edema none  Strength: normal  Range of Motion: full range of motion  Existing Limitations:      Site   [] Arthritis, bursitis    [] Amputation, atrophy    [] Other:    []       Diabetic patient's foot examination comments: None -  not diabetic  Incisional site: CABG site healing well, no drainage or redness noted  Special needs: pt currently ambulating with a walker    Psychosocial Assessment:   Outcome Survey Tools:    CATE SCORES:   PRE   Anxiety 1   Depression 0   Somatic 1   Hostility 0     SF-36 SCORES:   PRE   Physical Function 27   Social Function 7   Mental Health 29   Pain 10   Change in Health 2   Physical Role Limitation 1   Mental Role Limitation 3   Energy/Fatigue 12   Health Perceptions 22   Total Score 113     PHQ-9:  PHQ-9 Depression Patient Health Questionnaire 2021   Over the last two weeks how often have you been bothered by little  interest or pleasure in doing things 0   Over the last two weeks how often have you been bothered by feeling down, depressed or hopeless 0   Over the last two weeks how often have you been bothered by trouble falling or staying asleep, or sleeping too much 0   Over the last two weeks how often have you been bothered by feeling tired or having little energy 2   Over the last two weeks how often have you been bothered by a poor appetite or overeating 0   Over the last two weeks how often have you been bothered by feeling bad about yourself - or that you are a failure or have let yourself or your family down 0   Over the last two weeks how often have you been bothered by trouble concentrating on things, such as reading the newspaper or watching television 0   Over the last two weeks how often have you been bothered by moving or speaking so slowly that other people could have noticed. 0   Over the last two weeks how often have you been bothered by thoughts that you would be better off dead, or of hurting yourself 0   If you checked off any problems, how difficult have these problems made it for you to do your work, take care of things at home or get along with other people? Not difficult at all   Total Score 2              Living Arrangements: Lives with spouse  Family Support: children and paid caregiver  Self Reported: Effective Coping Skills and Stress  Displays: happiness and calmness  Medication: pt does not take medications at this time    Psychosocial Plan:   Goals:  Verbalizes coping mechanisms  Reduce manifestation of stress  Maintain positive support system  Maintain positive outlook  Improve overall quality of life    Interventions/Recommendations:  Discussed Results of Surveys  Patient to Self Report Emotional Changes at Session Check In  Recommend Physical Activity  Recommend Attending Education Lectures  Notify MD: No  Program Referral: No  Pharmaceutical Intervention/Therapy: No  Other Needs: not  applicable  Stage of Readiness to Change: Contemplation    Education:  Stress management, verbalizes understanding; Date:12/23/2021  Stress, verbalizes understanding; Date:12/23/2021    Comments:  Pt caring for her , verbalized increased stress with hurricaine. Pt instructed on resources for managing stress verbalized understanding.   Patient has been instructed to notify staff in the event that circumstances worsen.  Patient verbalizes understanding.    Other Core Components/Risk Factors Assessment:   RISK FACTORS:  hyperlipidemia, hypertension, positive family history, sedentary lifestyle, stress    Learning Barriers: None    Education Level:  Unknown    Pre-test Score: 50    Medication Compliance: has been compliant with taking medications    Other Core Components/Risk Factors Plan:   Goals:  Increase exercise tolerance: In Progress  Increase knowledge of CAD: In Progress  Decrease blood pressure: In Progress  Identify and manage personal areas of stress: In Progress  Learn more about healthy eating: In Progress    Interventions/Recommendations:  Recommend regular attendance for Cardiac Rehab: Exercise and Education Lectures  Encourage medication compliance  Individual Education/ Counseling: No  Physician Referral: No    Education:    blood pressure meds, verbalizes understanding; Date: 12/23/2021  cholesterol, verbalizes understanding; Date: 12/23/2021  cholesterol meds, verbalizes understanding; Date: 12/23/2021  hypertension, verbalizes understanding; Date: 12/23/2021  physical activity, verbalizes understanding; Date: 12/23/2021  risk factors, verbalizes understanding; Date: 12/23/2021  stress, verbalizes understanding; Date: 12/23/2021        Education method adapted to patients education level and preferred method of learning.  Method: explanation    Comments:  Pt instructed on risk factors, medication compliance, importance of exercise, benefits of cardiac rehab, reduction of stress and effects on  the body. Pt verbalized understanding.    Other Core Components/Hypertension Assessment:   Resting BP:   BP Readings from Last 1 Encounters:   12/23/21 (!) 172/70         BP Diagnosis: Hypertensive  Patient reported symptoms: none    Other Core Components/Hypertension Plan:   Goals:  Blood Pressure <130/80    Interventions/Recommendations:  Encourage medication compliance  Recommend physical activity  Educate on contributory factors  Reduce stress, anxiety, anger, depression, and/or chronic pain    Education:    Hypertension; verbalizes understanding; Date: 12/23/2021  Coronary Artery Disease; verbalizes understanding; Date: 12/23/2021  Risk Factors; verbalizes understanding; Date: 12/23/2021  Stress; verbalizes understanding; Date: 12/23/2021        Comments:  Pt reports compliance with medications. Pt instructed on taking medications prior to cardiac rehab, monitoring blood pressure, exercise to reduce stress levels; pt verbalized understanding.       Does the patient have Heart Failure? No    Other Core Components/Tobacco Cessation Assessment:   Smoking Status: lifetime non-smoker  Smoking Cessation Barriers: NA  Stage of Readiness to Change: Maintenance    Other Core Components/Tobacco Cessation Plan:   Goals:  Maintain non-smoking status    Interventions:  Maintains non-smoking status    Education:    Benefits of Cardiac Rehab; verbalizes understanding; Date: 12/23/2021        Comments:  Pt nonsmoker lifelong.    Discussed Cardiac Rehab program in depth with patient.  Medication list updated per patient & marked as reviewed.  Patient has been instructed to notify staff of any problems while attending rehab (ie: chest pain, shortness of breath, lightheadedness, dizziness).  Patient has been instructed to monitor blood pressure readings outside of rehab & to keep a daily log of the readings.  Patient verbalizes understanding.    Roberth Mota RN  Cardiac Rehab Nurse

## 2022-04-14 ENCOUNTER — OFFICE VISIT (OUTPATIENT)
Dept: CARDIOLOGY | Facility: CLINIC | Age: 87
End: 2022-04-14
Payer: MEDICARE

## 2022-04-14 VITALS
HEIGHT: 61 IN | DIASTOLIC BLOOD PRESSURE: 76 MMHG | WEIGHT: 129.63 LBS | SYSTOLIC BLOOD PRESSURE: 165 MMHG | BODY MASS INDEX: 24.47 KG/M2 | HEART RATE: 74 BPM

## 2022-04-14 DIAGNOSIS — I10 PRIMARY HYPERTENSION: Chronic | ICD-10-CM

## 2022-04-14 DIAGNOSIS — I25.10 CORONARY ARTERY DISEASE INVOLVING NATIVE CORONARY ARTERY OF NATIVE HEART WITHOUT ANGINA PECTORIS: Primary | Chronic | ICD-10-CM

## 2022-04-14 DIAGNOSIS — Z95.1 STATUS POST CORONARY ARTERY BYPASS GRAFTING: Chronic | ICD-10-CM

## 2022-04-14 DIAGNOSIS — I35.0 AORTIC STENOSIS, SEVERE: Chronic | ICD-10-CM

## 2022-04-14 DIAGNOSIS — I44.2 CHB (COMPLETE HEART BLOCK): Chronic | ICD-10-CM

## 2022-04-14 DIAGNOSIS — Z95.3 HISTORY OF AORTIC VALVE REPLACEMENT WITH BIOPROSTHETIC VALVE: Chronic | ICD-10-CM

## 2022-04-14 DIAGNOSIS — Z95.0 CARDIAC RESYNCHRONIZATION THERAPY PACEMAKER (CRT-P) IN PLACE: Chronic | ICD-10-CM

## 2022-04-14 DIAGNOSIS — E78.00 PURE HYPERCHOLESTEROLEMIA: Chronic | ICD-10-CM

## 2022-04-14 PROCEDURE — 3077F SYST BP >= 140 MM HG: CPT | Mod: CPTII,S$GLB,, | Performed by: INTERNAL MEDICINE

## 2022-04-14 PROCEDURE — 93000 ELECTROCARDIOGRAM COMPLETE: CPT | Mod: S$GLB,,, | Performed by: INTERNAL MEDICINE

## 2022-04-14 PROCEDURE — 3078F DIAST BP <80 MM HG: CPT | Mod: CPTII,S$GLB,, | Performed by: INTERNAL MEDICINE

## 2022-04-14 PROCEDURE — 3077F PR MOST RECENT SYSTOLIC BLOOD PRESSURE >= 140 MM HG: ICD-10-PCS | Mod: CPTII,S$GLB,, | Performed by: INTERNAL MEDICINE

## 2022-04-14 PROCEDURE — 93000 EKG 12-LEAD: ICD-10-PCS | Mod: S$GLB,,, | Performed by: INTERNAL MEDICINE

## 2022-04-14 PROCEDURE — 99999 PR PBB SHADOW E&M-EST. PATIENT-LVL III: CPT | Mod: PBBFAC,,, | Performed by: INTERNAL MEDICINE

## 2022-04-14 PROCEDURE — 1159F MED LIST DOCD IN RCRD: CPT | Mod: CPTII,S$GLB,, | Performed by: INTERNAL MEDICINE

## 2022-04-14 PROCEDURE — 1160F PR REVIEW ALL MEDS BY PRESCRIBER/CLIN PHARMACIST DOCUMENTED: ICD-10-PCS | Mod: CPTII,S$GLB,, | Performed by: INTERNAL MEDICINE

## 2022-04-14 PROCEDURE — 99214 OFFICE O/P EST MOD 30 MIN: CPT | Mod: 25,S$GLB,, | Performed by: INTERNAL MEDICINE

## 2022-04-14 PROCEDURE — 3078F PR MOST RECENT DIASTOLIC BLOOD PRESSURE < 80 MM HG: ICD-10-PCS | Mod: CPTII,S$GLB,, | Performed by: INTERNAL MEDICINE

## 2022-04-14 PROCEDURE — 1126F AMNT PAIN NOTED NONE PRSNT: CPT | Mod: CPTII,S$GLB,, | Performed by: INTERNAL MEDICINE

## 2022-04-14 PROCEDURE — 1101F PR PT FALLS ASSESS DOC 0-1 FALLS W/OUT INJ PAST YR: ICD-10-PCS | Mod: CPTII,S$GLB,, | Performed by: INTERNAL MEDICINE

## 2022-04-14 PROCEDURE — 1160F RVW MEDS BY RX/DR IN RCRD: CPT | Mod: CPTII,S$GLB,, | Performed by: INTERNAL MEDICINE

## 2022-04-14 PROCEDURE — 1126F PR PAIN SEVERITY QUANTIFIED, NO PAIN PRESENT: ICD-10-PCS | Mod: CPTII,S$GLB,, | Performed by: INTERNAL MEDICINE

## 2022-04-14 PROCEDURE — 3288F PR FALLS RISK ASSESSMENT DOCUMENTED: ICD-10-PCS | Mod: CPTII,S$GLB,, | Performed by: INTERNAL MEDICINE

## 2022-04-14 PROCEDURE — 99999 PR PBB SHADOW E&M-EST. PATIENT-LVL III: ICD-10-PCS | Mod: PBBFAC,,, | Performed by: INTERNAL MEDICINE

## 2022-04-14 PROCEDURE — 3288F FALL RISK ASSESSMENT DOCD: CPT | Mod: CPTII,S$GLB,, | Performed by: INTERNAL MEDICINE

## 2022-04-14 PROCEDURE — 1159F PR MEDICATION LIST DOCUMENTED IN MEDICAL RECORD: ICD-10-PCS | Mod: CPTII,S$GLB,, | Performed by: INTERNAL MEDICINE

## 2022-04-14 PROCEDURE — 99214 PR OFFICE/OUTPT VISIT, EST, LEVL IV, 30-39 MIN: ICD-10-PCS | Mod: 25,S$GLB,, | Performed by: INTERNAL MEDICINE

## 2022-04-14 PROCEDURE — 1101F PT FALLS ASSESS-DOCD LE1/YR: CPT | Mod: CPTII,S$GLB,, | Performed by: INTERNAL MEDICINE

## 2022-04-14 RX ORDER — ATORVASTATIN CALCIUM 40 MG/1
40 TABLET, FILM COATED ORAL DAILY
Qty: 90 TABLET | Refills: 3 | Status: SHIPPED | OUTPATIENT
Start: 2022-04-14 | End: 2022-11-18

## 2022-04-14 RX ORDER — LOSARTAN POTASSIUM AND HYDROCHLOROTHIAZIDE 12.5; 5 MG/1; MG/1
1 TABLET ORAL DAILY
Qty: 90 TABLET | Refills: 3 | Status: ON HOLD | OUTPATIENT
Start: 2022-04-14 | End: 2022-07-06 | Stop reason: HOSPADM

## 2022-04-14 RX ORDER — LOSARTAN POTASSIUM AND HYDROCHLOROTHIAZIDE 12.5; 5 MG/1; MG/1
1 TABLET ORAL DAILY
Qty: 90 TABLET | Refills: 3 | Status: SHIPPED | OUTPATIENT
Start: 2022-04-14 | End: 2022-04-14

## 2022-04-14 NOTE — PATIENT INSTRUCTIONS
Recommendations:  1. Resume atorvastatin 40 mg daily   2. Take 1 furosemide and 1 potassium tablet only if needed for leg swelling   3. Begin to take losartan/HCT 1 daily for blood pressure control
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
## 2022-04-28 ENCOUNTER — DOCUMENTATION ONLY (OUTPATIENT)
Dept: CARDIAC REHAB | Facility: CLINIC | Age: 87
End: 2022-04-28
Payer: MEDICARE

## 2022-04-28 NOTE — PROGRESS NOTES
Miss Lutz has completed 3 out of 36 exercise session of Phase II cardiac rehab.  A follow up reassessment will be completed at 12 sessions. Currently, Miss Lutz is on hold due to a family emergency.    Session: Orientation   Cardiac Rehab Individual Treatment Plan - Initial Assessment      Patient Name: Nelda Rawls MRN: [id number]   : 1935   Age: 86 y.o.   Primary Diagnosis: CABG  Date of Event: 10-14-21  EF: ?%  Risk Stratification: high  Referring Physician: YE   Exercise Assessment:     CPX/TM Date: 21 Results   RHR 88   Max    Peak VO2 (CPX only) 9.5   Actual METS (CPX only) 2.7   Estimated METS 2.0     Anthropometrics    Height 61 inches   Weight 130 lbs   BMI 24.6   Abdominal Girth 36   Body Composition 27.3%     ST Depression noted on Stress Test?:No  Angina with exercise?: No   Fall Risk: No   Assistive Devices:  walker   Currently exercising? No   There were no limitations noted by the patient.      Exercise Plan:   Goals:  CR Exercise Goals: Attend Cardiac Rehab 3 times/week: In Progress  Home Aerobic Exercise: 2 additional days/week for 30-60 minutes: In Progress  Intensity of 12-15 on the Rate of Perceived Exertion (RPE) scale: In Progress  30% increase in entry estimated METS: 2.6 : In Progress  5 days/week for 30-60 minutes: In Progress    Intervention:   Discussed importance of regular attendance to cardiac rehab class    Exercise Prescription:  THR Range    Mode: Treadmill  Recumbent Bike  Nustep  Elliptical   Frequency:  3 days/week   Duration:  30 - 60 minutes   Intensity:  12 - 15 RPE   Resistance Training:  Yes: 0 to 5 lb weights with 10-15 reps based on strength and range of motion assessment     Home Prescription:  Mode Aerobic   Frequency: 2- 3 days/week   Duration: 30-60 minutes   Resistance Training: None        Education:  Orientation to Equipment; verbalizes understanding; Date: 21  Exercise Recommendations; verbalizes understanding; Date:  21  Exercise Safety; verbalizes understanding; Date: 21  Class Preparation: verbalizes understanding; Date: 21  Signs and symptoms to report: verbalizes understanding; Date: 21  Caffeine/Hydration: verbalizes understanding; Date: 21  Exercise Terminology: verbalizes understanding; Date: 21  Resistance Training: verbalizes understanding; Date: 21    Comments:  Miss Lutz was encouraged to begin thinking about some type of aerobic exercise she can participate in at least 2 non-rehab days per week for at least 30 minutes in addition to attending Phase II cardiac rehab classes 3 days per week.  I suggested she walk around her home 10 minutes 3 times per day but to also work toward that goal slowly.  She stated understanding.    All consent forms were signed, proper attire and shoes were discussed.       Miss Lutz will begin Cardiac Rehab on  at 10:00am.    The exercise prescription will be adjusted based on tolerance of exercise intensity by patient.    Sohan Singh, CEP    Orientation Cardiac Rehab Individual Treatment Plan - Initial Assessment      Patient Name: Nelda Rawls MRN: [id number]   : 1935   Age: 86 y.o.   Primary Diagnosis: s/p CABG, CAD, HTN, HLD, PVD, CHF, complete heart block, + pacemaker, h/o AVR, CKD III    Nutrition Assessment:     Anthropometrics    Height 61 inches   Weight 130 lbs   BMI 24.6   Abdominal Girth 36   Body Composition 27.3       Drug Allergies and Intolerances:  Review of patient's allergies indicates:   Allergen Reactions    Penicillins Hives and Swelling       Food Allergies and Intolerances:  NA    Past Medical History:  No past medical history on file.    Past Surgical History:  Past Surgical History:   Procedure Laterality Date    CORONARY ARTERY BYPASS GRAFT         Medications:  Current Outpatient Medications   Medication Sig    acetaminophen (TYLENOL) 325 MG tablet Take 325 mg by mouth as needed for Pain.     aspirin 325 MG tablet Take 1 tablet (325 mg total) by mouth once daily.    atorvastatin (LIPITOR) 40 MG tablet Take 1 tablet (40 mg total) by mouth once daily.    furosemide (LASIX) 40 MG tablet Take 1 tablet (40 mg total) by mouth 2 (two) times daily.    loratadine (CLARITIN) 10 mg tablet Take 10 mg by mouth once daily.    losartan-hydrochlorothiazide 50-12.5 mg (HYZAAR) 50-12.5 mg per tablet Take 1 tablet by mouth once daily.    metoprolol tartrate (LOPRESSOR) 25 MG tablet Take 1 tablet (25 mg total) by mouth Daily. Takes 0.5mg BID    multivitamin (THERAGRAN) per tablet Take 1 tablet by mouth once daily.    potassium chloride (MICRO-K) 10 MEQ CpSR Take 1 capsule (10 mEq total) by mouth 2 (two) times daily.     No current facility-administered medications for this visit.       Vitamins and Supplements:  MVI, micro-K 10meq BID (lasix 40mg BID)    Labs:  Patient confirms she is taking lipitor 40mg for cholesterol control.    Lab Results   Component Value Date    CHOL 128 12/13/2021     Lab Results   Component Value Date    HDL 53 12/13/2021     Lab Results   Component Value Date    LDLCALC 61.4 (L) 12/13/2021     Lab Results   Component Value Date    TRIG 68 12/13/2021     Lab Results   Component Value Date    CHOLHDL 41.4 12/13/2021         Lab Results   Component Value Date    GLUF 96 12/13/2021     No results found for: HGBA1C    Nutrition/Diet History:  Patient eats 3 meals daily.    Seasons food with Mrs. Nagy.  Patient denies use of a salt shaker at the table on prepared foods.   Dines out 1 per week at restaurants such as [employer], [employer].    Chooses fried foods rarely  Chooses fish rarely   Beverages:  water, coffee  Alcohol: none    24 Hour Recall:  · Breakfast: oatmeal, banana, black coffee x 2  · Lunch:turkey with american cheese, miracle whip/mustard on honey wheat bread, veggie chips  · Dinner:roast with boiled potatoes, emma slaw, peach cobbler  · Other: 3 sandwich  cookies    Difficulty Chewing or Swallowing: no  Current Exercise: See Exercise Physiologist Note  Food Safety/Food Preparation: pt has daughters/helper that prepares food and freezes  Living Arrangements/Family Support: Lives with spouse  Cultural/Spiritual/Personal Preferences: not applicable   Barriers to Education: none identified  Stage of Change Related to Diet Habits: Contemplation    Nutrition Diagnosis:  1. Food and nutrition related knowledge deficit related to the lack of prior nutrition education as evidenced by diet history and 24 hour recall    Nutrition Plan:   Goals:  LDL-C < 70 (for high risk patients)  Hgb A1c < 7%  BMI < 25 and abdominal girth < 40M/<35 F  2 gram sodium, Mediterranean diet  Rehab weight goal: Maintenance  Fish intake (non-fried varieties) to a goal of 2-3 servings per week.   Increase fruit and vegetable intake    Interventions/Recommendations:  Lab results reviewed and discussed  Nutrition Prescription:  · Total Energy Estimated Needs: 2324-1001 Kcal/d for weight maintenance  · Method for Estimating Needs: 25-30kcal/kg  · Total Protein Estimated Needs: 35-59g/d  · Method for Estimating Needs: 0.6-1 g/Kg BW (reduced needs r/t dx CKD III)  · Total Fluid Estimated Needs: 1 mL/Kcal  Dietitian Consult: No  Patient to participate in Cardiac Rehab sessions three times a week  Weekly Dietitian Weight Check  Encouraged patient to complete 3 day food diary  Follow Up Plan for Ongoing Self-Management Support    Education:  Mediterranean Diet; verbalizes understanding; Date: 12/23/21   Person taught: patient and daughter   Preferred Learning Method: Verbal and written   Education Needed/Provided: Nutrition counseling and education related to cardiac rehabilitation   Education Method: Weekly nutrition lectures on the Mediterranean diet, cooking, shopping, and dining out   Written Materials Provided: 3 Day Food Record, Introduction to Mediterranean Diet   Strategies Implemented:  Motivational interviewing, Goal setting, Self-Monitoring, and Problem Solving    Comments:   Discussed ways to incorporate healthy snacks, eating on a schedule, and monitoring sodium intake for heart health.  Encouraged increased produce intake and increased hydration    Diabetes  Is the patient diabetic? No      RD contact information provided.      Brandi Quintero MS, RDN/LDN    Session: Orientation Cardiac Rehab Individual Treatment Plan - Initial Assessment      Patient Name: Nelda Rawls MRN: [id number]   : 1935   Age: 86 y.o.   Date of Event: 10/14/2021   Primary Diagnosis: CABG    EF: unknown    Physical Assessment:   There were no vitals taken for this visit.    ASSESSMENT:  Heart Sounds: regular rate and rhythm, S1, S2 normal, no murmur, click, rub or gallop  Prosthetic Valve: Yes  Lung Sounds: normal air entry, lungs clear to auscultation  Capillary Refill: normal  Left Radial Pulse: Normal (+2)  Right Radial Pulse: Normal (+2)  Left Pedal Pulse: Normal (+2)  Right Pedal Pulse: Normal (+2)  Right Edema: none  Left Edema none  Strength: normal  Range of Motion: full range of motion  Existing Limitations:      Site   [] Arthritis, bursitis    [] Amputation, atrophy    [] Other:    []       Diabetic patient's foot examination comments: None -  not diabetic  Incisional site: CABG site healing well, no drainage or redness noted  Special needs: pt currently ambulating with a walker    Psychosocial Assessment:   Outcome Survey Tools:    CATE SCORES:   PRE   Anxiety 1   Depression 0   Somatic 1   Hostility 0     SF-36 SCORES:   PRE   Physical Function 27   Social Function 7   Mental Health 29   Pain 10   Change in Health 2   Physical Role Limitation 1   Mental Role Limitation 3   Energy/Fatigue 12   Health Perceptions 22   Total Score 113     PHQ-9:  PHQ-9 Depression Patient Health Questionnaire 2021   Over the last two weeks how often have you been bothered by little interest or pleasure in doing  things 0   Over the last two weeks how often have you been bothered by feeling down, depressed or hopeless 0   Over the last two weeks how often have you been bothered by trouble falling or staying asleep, or sleeping too much 0   Over the last two weeks how often have you been bothered by feeling tired or having little energy 2   Over the last two weeks how often have you been bothered by a poor appetite or overeating 0   Over the last two weeks how often have you been bothered by feeling bad about yourself - or that you are a failure or have let yourself or your family down 0   Over the last two weeks how often have you been bothered by trouble concentrating on things, such as reading the newspaper or watching television 0   Over the last two weeks how often have you been bothered by moving or speaking so slowly that other people could have noticed. 0   Over the last two weeks how often have you been bothered by thoughts that you would be better off dead, or of hurting yourself 0   If you checked off any problems, how difficult have these problems made it for you to do your work, take care of things at home or get along with other people? Not difficult at all   Total Score 2              Living Arrangements: Lives with spouse  Family Support: children and paid caregiver  Self Reported: Effective Coping Skills and Stress  Displays: happiness and calmness  Medication: pt does not take medications at this time    Psychosocial Plan:   Goals:  Verbalizes coping mechanisms  Reduce manifestation of stress  Maintain positive support system  Maintain positive outlook  Improve overall quality of life    Interventions/Recommendations:  Discussed Results of Surveys  Patient to Self Report Emotional Changes at Session Check In  Recommend Physical Activity  Recommend Attending Education Lectures  Notify MD: No  Program Referral: No  Pharmaceutical Intervention/Therapy: No  Other Needs: not applicable  Stage of Readiness to  Change: Contemplation    Education:  Stress management, verbalizes understanding; Date:12/23/2021  Stress, verbalizes understanding; Date:12/23/2021    Comments:  Pt caring for her , verbalized increased stress with hurricaine. Pt instructed on resources for managing stress verbalized understanding.   Patient has been instructed to notify staff in the event that circumstances worsen.  Patient verbalizes understanding.    Other Core Components/Risk Factors Assessment:   RISK FACTORS:  hyperlipidemia, hypertension, positive family history, sedentary lifestyle, stress    Learning Barriers: None    Education Level:  Unknown    Pre-test Score: 50    Medication Compliance: has been compliant with taking medications    Other Core Components/Risk Factors Plan:   Goals:  Increase exercise tolerance: In Progress  Increase knowledge of CAD: In Progress  Decrease blood pressure: In Progress  Identify and manage personal areas of stress: In Progress  Learn more about healthy eating: In Progress    Interventions/Recommendations:  Recommend regular attendance for Cardiac Rehab: Exercise and Education Lectures  Encourage medication compliance  Individual Education/ Counseling: No  Physician Referral: No    Education:    blood pressure meds, verbalizes understanding; Date: 12/23/2021  cholesterol, verbalizes understanding; Date: 12/23/2021  cholesterol meds, verbalizes understanding; Date: 12/23/2021  hypertension, verbalizes understanding; Date: 12/23/2021  physical activity, verbalizes understanding; Date: 12/23/2021  risk factors, verbalizes understanding; Date: 12/23/2021  stress, verbalizes understanding; Date: 12/23/2021        Education method adapted to patients education level and preferred method of learning.  Method: explanation    Comments:  Pt instructed on risk factors, medication compliance, importance of exercise, benefits of cardiac rehab, reduction of stress and effects on the body. Pt verbalized  understanding.    Other Core Components/Hypertension Assessment:   Resting BP:   BP Readings from Last 1 Encounters:   04/14/22 (!) 165/76         BP Diagnosis: Hypertensive  Patient reported symptoms: none    Other Core Components/Hypertension Plan:   Goals:  Blood Pressure <130/80    Interventions/Recommendations:  Encourage medication compliance  Recommend physical activity  Educate on contributory factors  Reduce stress, anxiety, anger, depression, and/or chronic pain    Education:    Hypertension; verbalizes understanding; Date: 12/23/2021  Coronary Artery Disease; verbalizes understanding; Date: 12/23/2021  Risk Factors; verbalizes understanding; Date: 12/23/2021  Stress; verbalizes understanding; Date: 12/23/2021        Comments:  Pt reports compliance with medications. Pt instructed on taking medications prior to cardiac rehab, monitoring blood pressure, exercise to reduce stress levels; pt verbalized understanding.       Does the patient have Heart Failure? No    Other Core Components/Tobacco Cessation Assessment:   Smoking Status: lifetime non-smoker  Smoking Cessation Barriers: NA  Stage of Readiness to Change: Maintenance    Other Core Components/Tobacco Cessation Plan:   Goals:  Maintain non-smoking status    Interventions:  Maintains non-smoking status    Education:    Benefits of Cardiac Rehab; verbalizes understanding; Date: 12/23/2021        Comments:  Pt nonsmoker lifelong.    Discussed Cardiac Rehab program in depth with patient.  Medication list updated per patient & marked as reviewed.  Patient has been instructed to notify staff of any problems while attending rehab (ie: chest pain, shortness of breath, lightheadedness, dizziness).  Patient has been instructed to monitor blood pressure readings outside of rehab & to keep a daily log of the readings.  Patient verbalizes understanding.    Roberth Mota RN  Cardiac Rehab Nurse

## 2022-05-19 ENCOUNTER — DOCUMENTATION ONLY (OUTPATIENT)
Dept: CARDIAC REHAB | Facility: CLINIC | Age: 87
End: 2022-05-19
Payer: MEDICARE

## 2022-05-19 NOTE — PROGRESS NOTES
Miss Lutz has completed 3 out of 36 exercise session of Phase II cardiac rehab.  A follow up reassessment will be completed at 12 sessions. Currently, Miss Lutz is on hold due to a family emergency.    Session: Orientation   Cardiac Rehab Individual Treatment Plan - Initial Assessment      Patient Name: Nelda Rawls MRN: [id number]   : 1935   Age: 86 y.o.   Primary Diagnosis: CABG  Date of Event: 10-14-21  EF: ?%  Risk Stratification: high  Referring Physician: YE   Exercise Assessment:     CPX/TM Date: 21 Results   RHR 88   Max    Peak VO2 (CPX only) 9.5   Actual METS (CPX only) 2.7   Estimated METS 2.0     Anthropometrics    Height 61 inches   Weight 130 lbs   BMI 24.6   Abdominal Girth 36   Body Composition 27.3%     ST Depression noted on Stress Test?:No  Angina with exercise?: No   Fall Risk: No   Assistive Devices:  walker   Currently exercising? No   There were no limitations noted by the patient.      Exercise Plan:   Goals:  CR Exercise Goals: Attend Cardiac Rehab 3 times/week: In Progress  Home Aerobic Exercise: 2 additional days/week for 30-60 minutes: In Progress  Intensity of 12-15 on the Rate of Perceived Exertion (RPE) scale: In Progress  30% increase in entry estimated METS: 2.6 : In Progress  5 days/week for 30-60 minutes: In Progress    Intervention:   Discussed importance of regular attendance to cardiac rehab class    Exercise Prescription:  THR Range    Mode: Treadmill  Recumbent Bike  Nustep  Elliptical   Frequency:  3 days/week   Duration:  30 - 60 minutes   Intensity:  12 - 15 RPE   Resistance Training:  Yes: 0 to 5 lb weights with 10-15 reps based on strength and range of motion assessment     Home Prescription:  Mode Aerobic   Frequency: 2- 3 days/week   Duration: 30-60 minutes   Resistance Training: None        Education:  Orientation to Equipment; verbalizes understanding; Date: 21  Exercise Recommendations; verbalizes understanding; Date:  21  Exercise Safety; verbalizes understanding; Date: 21  Class Preparation: verbalizes understanding; Date: 21  Signs and symptoms to report: verbalizes understanding; Date: 21  Caffeine/Hydration: verbalizes understanding; Date: 21  Exercise Terminology: verbalizes understanding; Date: 21  Resistance Training: verbalizes understanding; Date: 21    Comments:  Miss Lutz was encouraged to begin thinking about some type of aerobic exercise she can participate in at least 2 non-rehab days per week for at least 30 minutes in addition to attending Phase II cardiac rehab classes 3 days per week.  I suggested she walk around her home 10 minutes 3 times per day but to also work toward that goal slowly.  She stated understanding.    All consent forms were signed, proper attire and shoes were discussed.       Miss Lutz will begin Cardiac Rehab on  at 10:00am.    The exercise prescription will be adjusted based on tolerance of exercise intensity by patient.    Sohan Singh, CEP    Orientation Cardiac Rehab Individual Treatment Plan - Initial Assessment      Patient Name: Nelda Rawls MRN: [id number]   : 1935   Age: 86 y.o.   Primary Diagnosis: s/p CABG, CAD, HTN, HLD, PVD, CHF, complete heart block, + pacemaker, h/o AVR, CKD III    Nutrition Assessment:     Anthropometrics    Height 61 inches   Weight 130 lbs   BMI 24.6   Abdominal Girth 36   Body Composition 27.3       Drug Allergies and Intolerances:  Review of patient's allergies indicates:   Allergen Reactions    Penicillins Hives and Swelling       Food Allergies and Intolerances:  NA    Past Medical History:  No past medical history on file.    Past Surgical History:  Past Surgical History:   Procedure Laterality Date    CORONARY ARTERY BYPASS GRAFT         Medications:  Current Outpatient Medications   Medication Sig    acetaminophen (TYLENOL) 325 MG tablet Take 325 mg by mouth as needed for Pain.     aspirin 325 MG tablet Take 1 tablet (325 mg total) by mouth once daily.    atorvastatin (LIPITOR) 40 MG tablet Take 1 tablet (40 mg total) by mouth once daily.    furosemide (LASIX) 40 MG tablet Take 1 tablet (40 mg total) by mouth 2 (two) times daily.    loratadine (CLARITIN) 10 mg tablet Take 10 mg by mouth once daily.    losartan-hydrochlorothiazide 50-12.5 mg (HYZAAR) 50-12.5 mg per tablet Take 1 tablet by mouth once daily.    metoprolol tartrate (LOPRESSOR) 25 MG tablet Take 1 tablet (25 mg total) by mouth Daily. Takes 0.5mg BID    multivitamin (THERAGRAN) per tablet Take 1 tablet by mouth once daily.    potassium chloride (MICRO-K) 10 MEQ CpSR Take 1 capsule (10 mEq total) by mouth 2 (two) times daily.     No current facility-administered medications for this visit.       Vitamins and Supplements:  MVI, micro-K 10meq BID (lasix 40mg BID)    Labs:  Patient confirms she is taking lipitor 40mg for cholesterol control.    Lab Results   Component Value Date    CHOL 128 12/13/2021     Lab Results   Component Value Date    HDL 53 12/13/2021     Lab Results   Component Value Date    LDLCALC 61.4 (L) 12/13/2021     Lab Results   Component Value Date    TRIG 68 12/13/2021     Lab Results   Component Value Date    CHOLHDL 41.4 12/13/2021         Lab Results   Component Value Date    GLUF 96 12/13/2021     No results found for: HGBA1C    Nutrition/Diet History:  Patient eats 3 meals daily.    Seasons food with Mrs. Nagy.  Patient denies use of a salt shaker at the table on prepared foods.   Dines out 1 per week at restaurants such as [employer], [employer].    Chooses fried foods rarely  Chooses fish rarely   Beverages:  water, coffee  Alcohol: none    24 Hour Recall:  · Breakfast: oatmeal, banana, black coffee x 2  · Lunch:turkey with american cheese, miracle whip/mustard on honey wheat bread, veggie chips  · Dinner:roast with boiled potatoes, emma slaw, peach cobbler  · Other: 3 sandwich  cookies    Difficulty Chewing or Swallowing: no  Current Exercise: See Exercise Physiologist Note  Food Safety/Food Preparation: pt has daughters/helper that prepares food and freezes  Living Arrangements/Family Support: Lives with spouse  Cultural/Spiritual/Personal Preferences: not applicable   Barriers to Education: none identified  Stage of Change Related to Diet Habits: Contemplation    Nutrition Diagnosis:  1. Food and nutrition related knowledge deficit related to the lack of prior nutrition education as evidenced by diet history and 24 hour recall    Nutrition Plan:   Goals:  LDL-C < 70 (for high risk patients)  Hgb A1c < 7%  BMI < 25 and abdominal girth < 40M/<35 F  2 gram sodium, Mediterranean diet  Rehab weight goal: Maintenance  Fish intake (non-fried varieties) to a goal of 2-3 servings per week.   Increase fruit and vegetable intake    Interventions/Recommendations:  Lab results reviewed and discussed  Nutrition Prescription:  · Total Energy Estimated Needs: 2896-4840 Kcal/d for weight maintenance  · Method for Estimating Needs: 25-30kcal/kg  · Total Protein Estimated Needs: 35-59g/d  · Method for Estimating Needs: 0.6-1 g/Kg BW (reduced needs r/t dx CKD III)  · Total Fluid Estimated Needs: 1 mL/Kcal  Dietitian Consult: No  Patient to participate in Cardiac Rehab sessions three times a week  Weekly Dietitian Weight Check  Encouraged patient to complete 3 day food diary  Follow Up Plan for Ongoing Self-Management Support    Education:  Mediterranean Diet; verbalizes understanding; Date: 12/23/21   Person taught: patient and daughter   Preferred Learning Method: Verbal and written   Education Needed/Provided: Nutrition counseling and education related to cardiac rehabilitation   Education Method: Weekly nutrition lectures on the Mediterranean diet, cooking, shopping, and dining out   Written Materials Provided: 3 Day Food Record, Introduction to Mediterranean Diet   Strategies Implemented:  Motivational interviewing, Goal setting, Self-Monitoring, and Problem Solving    Comments:   Discussed ways to incorporate healthy snacks, eating on a schedule, and monitoring sodium intake for heart health.  Encouraged increased produce intake and increased hydration    Diabetes  Is the patient diabetic? No      RD contact information provided.      Brandi Quintero MS, RDN/LDN    Session: Orientation Cardiac Rehab Individual Treatment Plan - Initial Assessment      Patient Name: Nelda Rawls MRN: [id number]   : 1935   Age: 86 y.o.   Date of Event: 10/14/2021   Primary Diagnosis: CABG    EF: unknown    Physical Assessment:   There were no vitals taken for this visit.    ASSESSMENT:  Heart Sounds: regular rate and rhythm, S1, S2 normal, no murmur, click, rub or gallop  Prosthetic Valve: Yes  Lung Sounds: normal air entry, lungs clear to auscultation  Capillary Refill: normal  Left Radial Pulse: Normal (+2)  Right Radial Pulse: Normal (+2)  Left Pedal Pulse: Normal (+2)  Right Pedal Pulse: Normal (+2)  Right Edema: none  Left Edema none  Strength: normal  Range of Motion: full range of motion  Existing Limitations:      Site   [] Arthritis, bursitis    [] Amputation, atrophy    [] Other:    []       Diabetic patient's foot examination comments: None -  not diabetic  Incisional site: CABG site healing well, no drainage or redness noted  Special needs: pt currently ambulating with a walker    Psychosocial Assessment:   Outcome Survey Tools:    CATE SCORES:   PRE   Anxiety 1   Depression 0   Somatic 1   Hostility 0     SF-36 SCORES:   PRE   Physical Function 27   Social Function 7   Mental Health 29   Pain 10   Change in Health 2   Physical Role Limitation 1   Mental Role Limitation 3   Energy/Fatigue 12   Health Perceptions 22   Total Score 113     PHQ-9:  PHQ-9 Depression Patient Health Questionnaire 2021   Over the last two weeks how often have you been bothered by little interest or pleasure in doing  things 0   Over the last two weeks how often have you been bothered by feeling down, depressed or hopeless 0   Over the last two weeks how often have you been bothered by trouble falling or staying asleep, or sleeping too much 0   Over the last two weeks how often have you been bothered by feeling tired or having little energy 2   Over the last two weeks how often have you been bothered by a poor appetite or overeating 0   Over the last two weeks how often have you been bothered by feeling bad about yourself - or that you are a failure or have let yourself or your family down 0   Over the last two weeks how often have you been bothered by trouble concentrating on things, such as reading the newspaper or watching television 0   Over the last two weeks how often have you been bothered by moving or speaking so slowly that other people could have noticed. 0   Over the last two weeks how often have you been bothered by thoughts that you would be better off dead, or of hurting yourself 0   If you checked off any problems, how difficult have these problems made it for you to do your work, take care of things at home or get along with other people? Not difficult at all   Total Score 2              Living Arrangements: Lives with spouse  Family Support: children and paid caregiver  Self Reported: Effective Coping Skills and Stress  Displays: happiness and calmness  Medication: pt does not take medications at this time    Psychosocial Plan:   Goals:  Verbalizes coping mechanisms  Reduce manifestation of stress  Maintain positive support system  Maintain positive outlook  Improve overall quality of life    Interventions/Recommendations:  Discussed Results of Surveys  Patient to Self Report Emotional Changes at Session Check In  Recommend Physical Activity  Recommend Attending Education Lectures  Notify MD: No  Program Referral: No  Pharmaceutical Intervention/Therapy: No  Other Needs: not applicable  Stage of Readiness to  Change: Contemplation    Education:  Stress management, verbalizes understanding; Date:12/23/2021  Stress, verbalizes understanding; Date:12/23/2021    Comments:  Pt caring for her , verbalized increased stress with hurricaine. Pt instructed on resources for managing stress verbalized understanding.   Patient has been instructed to notify staff in the event that circumstances worsen.  Patient verbalizes understanding.    Other Core Components/Risk Factors Assessment:   RISK FACTORS:  hyperlipidemia, hypertension, positive family history, sedentary lifestyle, stress    Learning Barriers: None    Education Level:  Unknown    Pre-test Score: 50    Medication Compliance: has been compliant with taking medications    Other Core Components/Risk Factors Plan:   Goals:  Increase exercise tolerance: In Progress  Increase knowledge of CAD: In Progress  Decrease blood pressure: In Progress  Identify and manage personal areas of stress: In Progress  Learn more about healthy eating: In Progress    Interventions/Recommendations:  Recommend regular attendance for Cardiac Rehab: Exercise and Education Lectures  Encourage medication compliance  Individual Education/ Counseling: No  Physician Referral: No    Education:    blood pressure meds, verbalizes understanding; Date: 12/23/2021  cholesterol, verbalizes understanding; Date: 12/23/2021  cholesterol meds, verbalizes understanding; Date: 12/23/2021  hypertension, verbalizes understanding; Date: 12/23/2021  physical activity, verbalizes understanding; Date: 12/23/2021  risk factors, verbalizes understanding; Date: 12/23/2021  stress, verbalizes understanding; Date: 12/23/2021        Education method adapted to patients education level and preferred method of learning.  Method: explanation    Comments:  Pt instructed on risk factors, medication compliance, importance of exercise, benefits of cardiac rehab, reduction of stress and effects on the body. Pt verbalized  understanding.    Other Core Components/Hypertension Assessment:   Resting BP:   BP Readings from Last 1 Encounters:   04/14/22 (!) 165/76         BP Diagnosis: Hypertensive  Patient reported symptoms: none    Other Core Components/Hypertension Plan:   Goals:  Blood Pressure <130/80    Interventions/Recommendations:  Encourage medication compliance  Recommend physical activity  Educate on contributory factors  Reduce stress, anxiety, anger, depression, and/or chronic pain    Education:    Hypertension; verbalizes understanding; Date: 12/23/2021  Coronary Artery Disease; verbalizes understanding; Date: 12/23/2021  Risk Factors; verbalizes understanding; Date: 12/23/2021  Stress; verbalizes understanding; Date: 12/23/2021        Comments:  Pt reports compliance with medications. Pt instructed on taking medications prior to cardiac rehab, monitoring blood pressure, exercise to reduce stress levels; pt verbalized understanding.       Does the patient have Heart Failure? No    Other Core Components/Tobacco Cessation Assessment:   Smoking Status: lifetime non-smoker  Smoking Cessation Barriers: NA  Stage of Readiness to Change: Maintenance    Other Core Components/Tobacco Cessation Plan:   Goals:  Maintain non-smoking status    Interventions:  Maintains non-smoking status    Education:    Benefits of Cardiac Rehab; verbalizes understanding; Date: 12/23/2021        Comments:  Pt nonsmoker lifelong.    Discussed Cardiac Rehab program in depth with patient.  Medication list updated per patient & marked as reviewed.  Patient has been instructed to notify staff of any problems while attending rehab (ie: chest pain, shortness of breath, lightheadedness, dizziness).  Patient has been instructed to monitor blood pressure readings outside of rehab & to keep a daily log of the readings.  Patient verbalizes understanding.    Roberth Mota RN  Cardiac Rehab Nurse

## 2022-06-09 ENCOUNTER — DOCUMENTATION ONLY (OUTPATIENT)
Dept: CARDIAC REHAB | Facility: CLINIC | Age: 87
End: 2022-06-09
Payer: MEDICARE

## 2022-06-09 NOTE — PROGRESS NOTES
Miss Lutz has completed 3 out of 36 exercise session of Phase II cardiac rehab.  A follow up reassessment will be completed at 12 sessions. Currently, Miss Lutz is on hold due to a family emergency.    Session: Orientation   Cardiac Rehab Individual Treatment Plan - Initial Assessment      Patient Name: Nelda Rawls MRN: [id number]   : 1935   Age: 86 y.o.   Primary Diagnosis: CABG  Date of Event: 10-14-21  EF: ?%  Risk Stratification: high  Referring Physician: YE   Exercise Assessment:     CPX/TM Date: 21 Results   RHR 88   Max    Peak VO2 (CPX only) 9.5   Actual METS (CPX only) 2.7   Estimated METS 2.0     Anthropometrics    Height 61 inches   Weight 130 lbs   BMI 24.6   Abdominal Girth 36   Body Composition 27.3%     ST Depression noted on Stress Test?:No  Angina with exercise?: No   Fall Risk: No   Assistive Devices:  walker   Currently exercising? No   There were no limitations noted by the patient.      Exercise Plan:   Goals:  CR Exercise Goals: Attend Cardiac Rehab 3 times/week: In Progress  Home Aerobic Exercise: 2 additional days/week for 30-60 minutes: In Progress  Intensity of 12-15 on the Rate of Perceived Exertion (RPE) scale: In Progress  30% increase in entry estimated METS: 2.6 : In Progress  5 days/week for 30-60 minutes: In Progress    Intervention:   Discussed importance of regular attendance to cardiac rehab class    Exercise Prescription:  THR Range    Mode: Treadmill  Recumbent Bike  Nustep  Elliptical   Frequency:  3 days/week   Duration:  30 - 60 minutes   Intensity:  12 - 15 RPE   Resistance Training:  Yes: 0 to 5 lb weights with 10-15 reps based on strength and range of motion assessment     Home Prescription:  Mode Aerobic   Frequency: 2- 3 days/week   Duration: 30-60 minutes   Resistance Training: None        Education:  Orientation to Equipment; verbalizes understanding; Date: 21  Exercise Recommendations; verbalizes understanding; Date:  21  Exercise Safety; verbalizes understanding; Date: 21  Class Preparation: verbalizes understanding; Date: 21  Signs and symptoms to report: verbalizes understanding; Date: 21  Caffeine/Hydration: verbalizes understanding; Date: 21  Exercise Terminology: verbalizes understanding; Date: 21  Resistance Training: verbalizes understanding; Date: 21    Comments:  Miss Lutz was encouraged to begin thinking about some type of aerobic exercise she can participate in at least 2 non-rehab days per week for at least 30 minutes in addition to attending Phase II cardiac rehab classes 3 days per week.  I suggested she walk around her home 10 minutes 3 times per day but to also work toward that goal slowly.  She stated understanding.    All consent forms were signed, proper attire and shoes were discussed.       Miss Lutz will begin Cardiac Rehab on  at 10:00am.    The exercise prescription will be adjusted based on tolerance of exercise intensity by patient.    Sohan Singh, CEP    Orientation Cardiac Rehab Individual Treatment Plan - Initial Assessment      Patient Name: Nelda Rawls MRN: [id number]   : 1935   Age: 86 y.o.   Primary Diagnosis: s/p CABG, CAD, HTN, HLD, PVD, CHF, complete heart block, + pacemaker, h/o AVR, CKD III    Nutrition Assessment:     Anthropometrics    Height 61 inches   Weight 130 lbs   BMI 24.6   Abdominal Girth 36   Body Composition 27.3       Drug Allergies and Intolerances:  Review of patient's allergies indicates:   Allergen Reactions    Penicillins Hives and Swelling       Food Allergies and Intolerances:  NA    Past Medical History:  No past medical history on file.    Past Surgical History:  Past Surgical History:   Procedure Laterality Date    CORONARY ARTERY BYPASS GRAFT         Medications:  Current Outpatient Medications   Medication Sig    acetaminophen (TYLENOL) 325 MG tablet Take 325 mg by mouth as needed for Pain.     aspirin 325 MG tablet Take 1 tablet (325 mg total) by mouth once daily.    atorvastatin (LIPITOR) 40 MG tablet Take 1 tablet (40 mg total) by mouth once daily.    furosemide (LASIX) 40 MG tablet Take 1 tablet (40 mg total) by mouth 2 (two) times daily.    loratadine (CLARITIN) 10 mg tablet Take 10 mg by mouth once daily.    losartan-hydrochlorothiazide 50-12.5 mg (HYZAAR) 50-12.5 mg per tablet Take 1 tablet by mouth once daily.    metoprolol tartrate (LOPRESSOR) 25 MG tablet Take 1 tablet (25 mg total) by mouth Daily. Takes 0.5mg BID    multivitamin (THERAGRAN) per tablet Take 1 tablet by mouth once daily.    potassium chloride (MICRO-K) 10 MEQ CpSR Take 1 capsule (10 mEq total) by mouth 2 (two) times daily.     No current facility-administered medications for this visit.       Vitamins and Supplements:  MVI, micro-K 10meq BID (lasix 40mg BID)    Labs:  Patient confirms she is taking lipitor 40mg for cholesterol control.    Lab Results   Component Value Date    CHOL 128 12/13/2021     Lab Results   Component Value Date    HDL 53 12/13/2021     Lab Results   Component Value Date    LDLCALC 61.4 (L) 12/13/2021     Lab Results   Component Value Date    TRIG 68 12/13/2021     Lab Results   Component Value Date    CHOLHDL 41.4 12/13/2021         Lab Results   Component Value Date    GLUF 96 12/13/2021     No results found for: HGBA1C    Nutrition/Diet History:  Patient eats 3 meals daily.    Seasons food with Mrs. Nagy.  Patient denies use of a salt shaker at the table on prepared foods.   Dines out 1 per week at restaurants such as [employer], [employer].    Chooses fried foods rarely  Chooses fish rarely   Beverages:  water, coffee  Alcohol: none    24 Hour Recall:  · Breakfast: oatmeal, banana, black coffee x 2  · Lunch:turkey with american cheese, miracle whip/mustard on honey wheat bread, veggie chips  · Dinner:roast with boiled potatoes, emma slaw, peach cobbler  · Other: 3 sandwich  cookies    Difficulty Chewing or Swallowing: no  Current Exercise: See Exercise Physiologist Note  Food Safety/Food Preparation: pt has daughters/helper that prepares food and freezes  Living Arrangements/Family Support: Lives with spouse  Cultural/Spiritual/Personal Preferences: not applicable   Barriers to Education: none identified  Stage of Change Related to Diet Habits: Contemplation    Nutrition Diagnosis:  1. Food and nutrition related knowledge deficit related to the lack of prior nutrition education as evidenced by diet history and 24 hour recall    Nutrition Plan:   Goals:  LDL-C < 70 (for high risk patients)  Hgb A1c < 7%  BMI < 25 and abdominal girth < 40M/<35 F  2 gram sodium, Mediterranean diet  Rehab weight goal: Maintenance  Fish intake (non-fried varieties) to a goal of 2-3 servings per week.   Increase fruit and vegetable intake    Interventions/Recommendations:  Lab results reviewed and discussed  Nutrition Prescription:  · Total Energy Estimated Needs: 5080-9182 Kcal/d for weight maintenance  · Method for Estimating Needs: 25-30kcal/kg  · Total Protein Estimated Needs: 35-59g/d  · Method for Estimating Needs: 0.6-1 g/Kg BW (reduced needs r/t dx CKD III)  · Total Fluid Estimated Needs: 1 mL/Kcal  Dietitian Consult: No  Patient to participate in Cardiac Rehab sessions three times a week  Weekly Dietitian Weight Check  Encouraged patient to complete 3 day food diary  Follow Up Plan for Ongoing Self-Management Support    Education:  Mediterranean Diet; verbalizes understanding; Date: 12/23/21   Person taught: patient and daughter   Preferred Learning Method: Verbal and written   Education Needed/Provided: Nutrition counseling and education related to cardiac rehabilitation   Education Method: Weekly nutrition lectures on the Mediterranean diet, cooking, shopping, and dining out   Written Materials Provided: 3 Day Food Record, Introduction to Mediterranean Diet   Strategies Implemented:  Motivational interviewing, Goal setting, Self-Monitoring, and Problem Solving    Comments:   Discussed ways to incorporate healthy snacks, eating on a schedule, and monitoring sodium intake for heart health.  Encouraged increased produce intake and increased hydration    Diabetes  Is the patient diabetic? No      RD contact information provided.      Brandi Quintero MS, RDN/LDN    Session: Orientation Cardiac Rehab Individual Treatment Plan - Initial Assessment      Patient Name: Nelda Rawls MRN: [id number]   : 1935   Age: 86 y.o.   Date of Event: 10/14/2021   Primary Diagnosis: CABG    EF: unknown    Physical Assessment:   There were no vitals taken for this visit.    ASSESSMENT:  Heart Sounds: regular rate and rhythm, S1, S2 normal, no murmur, click, rub or gallop  Prosthetic Valve: Yes  Lung Sounds: normal air entry, lungs clear to auscultation  Capillary Refill: normal  Left Radial Pulse: Normal (+2)  Right Radial Pulse: Normal (+2)  Left Pedal Pulse: Normal (+2)  Right Pedal Pulse: Normal (+2)  Right Edema: none  Left Edema none  Strength: normal  Range of Motion: full range of motion  Existing Limitations:      Site   [] Arthritis, bursitis    [] Amputation, atrophy    [] Other:    []       Diabetic patient's foot examination comments: None -  not diabetic  Incisional site: CABG site healing well, no drainage or redness noted  Special needs: pt currently ambulating with a walker    Psychosocial Assessment:   Outcome Survey Tools:    CATE SCORES:   PRE   Anxiety 1   Depression 0   Somatic 1   Hostility 0     SF-36 SCORES:   PRE   Physical Function 27   Social Function 7   Mental Health 29   Pain 10   Change in Health 2   Physical Role Limitation 1   Mental Role Limitation 3   Energy/Fatigue 12   Health Perceptions 22   Total Score 113     PHQ-9:  PHQ-9 Depression Patient Health Questionnaire 2021   Over the last two weeks how often have you been bothered by little interest or pleasure in doing  things 0   Over the last two weeks how often have you been bothered by feeling down, depressed or hopeless 0   Over the last two weeks how often have you been bothered by trouble falling or staying asleep, or sleeping too much 0   Over the last two weeks how often have you been bothered by feeling tired or having little energy 2   Over the last two weeks how often have you been bothered by a poor appetite or overeating 0   Over the last two weeks how often have you been bothered by feeling bad about yourself - or that you are a failure or have let yourself or your family down 0   Over the last two weeks how often have you been bothered by trouble concentrating on things, such as reading the newspaper or watching television 0   Over the last two weeks how often have you been bothered by moving or speaking so slowly that other people could have noticed. 0   Over the last two weeks how often have you been bothered by thoughts that you would be better off dead, or of hurting yourself 0   If you checked off any problems, how difficult have these problems made it for you to do your work, take care of things at home or get along with other people? Not difficult at all   Total Score 2              Living Arrangements: Lives with spouse  Family Support: children and paid caregiver  Self Reported: Effective Coping Skills and Stress  Displays: happiness and calmness  Medication: pt does not take medications at this time    Psychosocial Plan:   Goals:  Verbalizes coping mechanisms  Reduce manifestation of stress  Maintain positive support system  Maintain positive outlook  Improve overall quality of life    Interventions/Recommendations:  Discussed Results of Surveys  Patient to Self Report Emotional Changes at Session Check In  Recommend Physical Activity  Recommend Attending Education Lectures  Notify MD: No  Program Referral: No  Pharmaceutical Intervention/Therapy: No  Other Needs: not applicable  Stage of Readiness to  Change: Contemplation    Education:  Stress management, verbalizes understanding; Date:12/23/2021  Stress, verbalizes understanding; Date:12/23/2021    Comments:  Pt caring for her , verbalized increased stress with hurricaine. Pt instructed on resources for managing stress verbalized understanding.   Patient has been instructed to notify staff in the event that circumstances worsen.  Patient verbalizes understanding.    Other Core Components/Risk Factors Assessment:   RISK FACTORS:  hyperlipidemia, hypertension, positive family history, sedentary lifestyle, stress    Learning Barriers: None    Education Level:  Unknown    Pre-test Score: 50    Medication Compliance: has been compliant with taking medications    Other Core Components/Risk Factors Plan:   Goals:  Increase exercise tolerance: In Progress  Increase knowledge of CAD: In Progress  Decrease blood pressure: In Progress  Identify and manage personal areas of stress: In Progress  Learn more about healthy eating: In Progress    Interventions/Recommendations:  Recommend regular attendance for Cardiac Rehab: Exercise and Education Lectures  Encourage medication compliance  Individual Education/ Counseling: No  Physician Referral: No    Education:    blood pressure meds, verbalizes understanding; Date: 12/23/2021  cholesterol, verbalizes understanding; Date: 12/23/2021  cholesterol meds, verbalizes understanding; Date: 12/23/2021  hypertension, verbalizes understanding; Date: 12/23/2021  physical activity, verbalizes understanding; Date: 12/23/2021  risk factors, verbalizes understanding; Date: 12/23/2021  stress, verbalizes understanding; Date: 12/23/2021        Education method adapted to patients education level and preferred method of learning.  Method: explanation    Comments:  Pt instructed on risk factors, medication compliance, importance of exercise, benefits of cardiac rehab, reduction of stress and effects on the body. Pt verbalized  understanding.    Other Core Components/Hypertension Assessment:   Resting BP:   BP Readings from Last 1 Encounters:   04/14/22 (!) 165/76         BP Diagnosis: Hypertensive  Patient reported symptoms: none    Other Core Components/Hypertension Plan:   Goals:  Blood Pressure <130/80    Interventions/Recommendations:  Encourage medication compliance  Recommend physical activity  Educate on contributory factors  Reduce stress, anxiety, anger, depression, and/or chronic pain    Education:    Hypertension; verbalizes understanding; Date: 12/23/2021  Coronary Artery Disease; verbalizes understanding; Date: 12/23/2021  Risk Factors; verbalizes understanding; Date: 12/23/2021  Stress; verbalizes understanding; Date: 12/23/2021        Comments:  Pt reports compliance with medications. Pt instructed on taking medications prior to cardiac rehab, monitoring blood pressure, exercise to reduce stress levels; pt verbalized understanding.       Does the patient have Heart Failure? No    Other Core Components/Tobacco Cessation Assessment:   Smoking Status: lifetime non-smoker  Smoking Cessation Barriers: NA  Stage of Readiness to Change: Maintenance    Other Core Components/Tobacco Cessation Plan:   Goals:  Maintain non-smoking status    Interventions:  Maintains non-smoking status    Education:    Benefits of Cardiac Rehab; verbalizes understanding; Date: 12/23/2021        Comments:  Pt nonsmoker lifelong.    Discussed Cardiac Rehab program in depth with patient.  Medication list updated per patient & marked as reviewed.  Patient has been instructed to notify staff of any problems while attending rehab (ie: chest pain, shortness of breath, lightheadedness, dizziness).  Patient has been instructed to monitor blood pressure readings outside of rehab & to keep a daily log of the readings.  Patient verbalizes understanding.    Roberth Mota RN  Cardiac Rehab Nurse

## 2022-06-28 ENCOUNTER — TELEPHONE (OUTPATIENT)
Dept: CARDIOLOGY | Facility: CLINIC | Age: 87
End: 2022-06-28
Payer: MEDICARE

## 2022-06-28 NOTE — TELEPHONE ENCOUNTER
----- Message from Clifford Alejandre sent at 6/28/2022  8:14 AM CDT -----  Regarding: Questions/ Concerns  Contact: [phone]  Questions/ Concerns    Who Called: Satnam Joiner(daughter)  Reason:Pacemaker Questions/ Concerns  Call Back Number:[phone]  Additional Information: The patient daughter called to speak to the nurse, regarding the patient pacemaker.

## 2022-06-29 ENCOUNTER — TELEPHONE (OUTPATIENT)
Dept: CARDIOLOGY | Facility: CLINIC | Age: 87
End: 2022-06-29
Payer: MEDICARE

## 2022-06-29 NOTE — TELEPHONE ENCOUNTER
Spoke with the daughter, the patient has been getting weaker.  She will have her pacemaker checked.  The patient will also have blood work done now tomorrow.  If her weakness becomes worse, she will need to go to the emergency room.

## 2022-06-29 NOTE — TELEPHONE ENCOUNTER
----- Message from Hannah Osullivan sent at 6/29/2022  1:36 PM CDT -----  Regarding: ret call  Pt's daughter Yoselin is returning your call and can be reached at [phone].    Thank you

## 2022-06-29 NOTE — TELEPHONE ENCOUNTER
Daughter called, pt with elevated BP x 1 day- 167/76 and weakness x 1 day, unable to stand; daughter stated pt has not had pacer check since starting care at [facility]; Scheduled for pacer check 7/6/2022

## 2022-06-30 ENCOUNTER — LAB VISIT (OUTPATIENT)
Dept: LAB | Facility: HOSPITAL | Age: 87
End: 2022-06-30
Attending: INTERNAL MEDICINE
Payer: MEDICARE

## 2022-06-30 ENCOUNTER — DOCUMENTATION ONLY (OUTPATIENT)
Dept: CARDIAC REHAB | Facility: CLINIC | Age: 87
End: 2022-06-30
Payer: MEDICARE

## 2022-06-30 DIAGNOSIS — I10 PRIMARY HYPERTENSION: Chronic | ICD-10-CM

## 2022-06-30 DIAGNOSIS — E78.00 PURE HYPERCHOLESTEROLEMIA: Chronic | ICD-10-CM

## 2022-06-30 DIAGNOSIS — I25.10 CORONARY ARTERY DISEASE INVOLVING NATIVE CORONARY ARTERY OF NATIVE HEART WITHOUT ANGINA PECTORIS: Chronic | ICD-10-CM

## 2022-06-30 LAB
ALBUMIN SERPL BCP-MCNC: 4.3 G/DL (ref 3.5–5.2)
ALP SERPL-CCNC: 90 U/L (ref 55–135)
ALT SERPL W/O P-5'-P-CCNC: 12 U/L (ref 10–44)
ANION GAP SERPL CALC-SCNC: 7 MMOL/L (ref 8–16)
AST SERPL-CCNC: 17 U/L (ref 10–40)
BASOPHILS # BLD AUTO: 0.03 K/UL (ref 0–0.2)
BASOPHILS NFR BLD: 0.5 % (ref 0–1.9)
BILIRUB SERPL-MCNC: 0.5 MG/DL (ref 0.1–1)
BUN SERPL-MCNC: 20 MG/DL (ref 8–23)
CALCIUM SERPL-MCNC: 10 MG/DL (ref 8.7–10.5)
CHLORIDE SERPL-SCNC: 94 MMOL/L (ref 95–110)
CHOLEST SERPL-MCNC: 151 MG/DL (ref 120–199)
CHOLEST/HDLC SERPL: 2.3 {RATIO} (ref 2–5)
CO2 SERPL-SCNC: 29 MMOL/L (ref 23–29)
CREAT SERPL-MCNC: 1 MG/DL (ref 0.5–1.4)
DIFFERENTIAL METHOD: ABNORMAL
EOSINOPHIL # BLD AUTO: 0.1 K/UL (ref 0–0.5)
EOSINOPHIL NFR BLD: 1.5 % (ref 0–8)
ERYTHROCYTE [DISTWIDTH] IN BLOOD BY AUTOMATED COUNT: 13.9 % (ref 11.5–14.5)
EST. GFR  (AFRICAN AMERICAN): 59.4 ML/MIN/1.73 M^2
EST. GFR  (NON AFRICAN AMERICAN): 51.5 ML/MIN/1.73 M^2
GLUCOSE SERPL-MCNC: 93 MG/DL (ref 70–110)
HCT VFR BLD AUTO: 36.5 % (ref 37–48.5)
HDLC SERPL-MCNC: 65 MG/DL (ref 40–75)
HDLC SERPL: 43 % (ref 20–50)
HGB BLD-MCNC: 12.2 G/DL (ref 12–16)
IMM GRANULOCYTES # BLD AUTO: 0.01 K/UL (ref 0–0.04)
IMM GRANULOCYTES NFR BLD AUTO: 0.2 % (ref 0–0.5)
LDLC SERPL CALC-MCNC: 67.6 MG/DL (ref 63–159)
LYMPHOCYTES # BLD AUTO: 1.6 K/UL (ref 1–4.8)
LYMPHOCYTES NFR BLD: 28.8 % (ref 18–48)
MCH RBC QN AUTO: 30.3 PG (ref 27–31)
MCHC RBC AUTO-ENTMCNC: 33.4 G/DL (ref 32–36)
MCV RBC AUTO: 91 FL (ref 82–98)
MONOCYTES # BLD AUTO: 0.6 K/UL (ref 0.3–1)
MONOCYTES NFR BLD: 10.6 % (ref 4–15)
NEUTROPHILS # BLD AUTO: 3.2 K/UL (ref 1.8–7.7)
NEUTROPHILS NFR BLD: 58.4 % (ref 38–73)
NONHDLC SERPL-MCNC: 86 MG/DL
NRBC BLD-RTO: 0 /100 WBC
PLATELET # BLD AUTO: 231 K/UL (ref 150–450)
PMV BLD AUTO: 9.7 FL (ref 9.2–12.9)
POTASSIUM SERPL-SCNC: 4.6 MMOL/L (ref 3.5–5.1)
PROT SERPL-MCNC: 7.6 G/DL (ref 6–8.4)
RBC # BLD AUTO: 4.02 M/UL (ref 4–5.4)
SODIUM SERPL-SCNC: 130 MMOL/L (ref 136–145)
TRIGL SERPL-MCNC: 92 MG/DL (ref 30–150)
WBC # BLD AUTO: 5.46 K/UL (ref 3.9–12.7)

## 2022-06-30 PROCEDURE — 85025 COMPLETE CBC W/AUTO DIFF WBC: CPT | Performed by: INTERNAL MEDICINE

## 2022-06-30 PROCEDURE — 80061 LIPID PANEL: CPT | Performed by: INTERNAL MEDICINE

## 2022-06-30 PROCEDURE — 36415 COLL VENOUS BLD VENIPUNCTURE: CPT | Mod: PO | Performed by: INTERNAL MEDICINE

## 2022-06-30 PROCEDURE — 80053 COMPREHEN METABOLIC PANEL: CPT | Performed by: INTERNAL MEDICINE

## 2022-06-30 NOTE — PROGRESS NOTES
Miss Lutz has completed 3 out of 36 exercise session of Phase II cardiac rehab.  A follow up reassessment will be completed at 12 sessions. Currently, Miss Lutz is on hold due to a family emergency.    Session: Orientation   Cardiac Rehab Individual Treatment Plan - Initial Assessment      Patient Name: Nelda Rawls MRN: [id number]   : 1935   Age: 86 y.o.   Primary Diagnosis: CABG  Date of Event: 10-14-21  EF: ?%  Risk Stratification: high  Referring Physician: YE   Exercise Assessment:     CPX/TM Date: 21 Results   RHR 88   Max    Peak VO2 (CPX only) 9.5   Actual METS (CPX only) 2.7   Estimated METS 2.0     Anthropometrics    Height 61 inches   Weight 130 lbs   BMI 24.6   Abdominal Girth 36   Body Composition 27.3%     ST Depression noted on Stress Test?:No  Angina with exercise?: No   Fall Risk: No   Assistive Devices:  walker   Currently exercising? No   There were no limitations noted by the patient.      Exercise Plan:   Goals:  CR Exercise Goals: Attend Cardiac Rehab 3 times/week: In Progress  Home Aerobic Exercise: 2 additional days/week for 30-60 minutes: In Progress  Intensity of 12-15 on the Rate of Perceived Exertion (RPE) scale: In Progress  30% increase in entry estimated METS: 2.6 : In Progress  5 days/week for 30-60 minutes: In Progress    Intervention:   Discussed importance of regular attendance to cardiac rehab class    Exercise Prescription:  THR Range    Mode: Treadmill  Recumbent Bike  Nustep  Elliptical   Frequency:  3 days/week   Duration:  30 - 60 minutes   Intensity:  12 - 15 RPE   Resistance Training:  Yes: 0 to 5 lb weights with 10-15 reps based on strength and range of motion assessment     Home Prescription:  Mode Aerobic   Frequency: 2- 3 days/week   Duration: 30-60 minutes   Resistance Training: None        Education:  Orientation to Equipment; verbalizes understanding; Date: 21  Exercise Recommendations; verbalizes understanding; Date:  21  Exercise Safety; verbalizes understanding; Date: 21  Class Preparation: verbalizes understanding; Date: 21  Signs and symptoms to report: verbalizes understanding; Date: 21  Caffeine/Hydration: verbalizes understanding; Date: 21  Exercise Terminology: verbalizes understanding; Date: 21  Resistance Training: verbalizes understanding; Date: 21    Comments:  Miss Lutz was encouraged to begin thinking about some type of aerobic exercise she can participate in at least 2 non-rehab days per week for at least 30 minutes in addition to attending Phase II cardiac rehab classes 3 days per week.  I suggested she walk around her home 10 minutes 3 times per day but to also work toward that goal slowly.  She stated understanding.    All consent forms were signed, proper attire and shoes were discussed.       Miss Lutz will begin Cardiac Rehab on  at 10:00am.    The exercise prescription will be adjusted based on tolerance of exercise intensity by patient.    Sohan Singh, CEP    Orientation Cardiac Rehab Individual Treatment Plan - Initial Assessment      Patient Name: Nelda Rawls MRN: [id number]   : 1935   Age: 86 y.o.   Primary Diagnosis: s/p CABG, CAD, HTN, HLD, PVD, CHF, complete heart block, + pacemaker, h/o AVR, CKD III    Nutrition Assessment:     Anthropometrics    Height 61 inches   Weight 130 lbs   BMI 24.6   Abdominal Girth 36   Body Composition 27.3       Drug Allergies and Intolerances:  Review of patient's allergies indicates:   Allergen Reactions    Penicillins Hives and Swelling       Food Allergies and Intolerances:  NA    Past Medical History:  No past medical history on file.    Past Surgical History:  Past Surgical History:   Procedure Laterality Date    CORONARY ARTERY BYPASS GRAFT         Medications:  Current Outpatient Medications   Medication Sig    acetaminophen (TYLENOL) 325 MG tablet Take 325 mg by mouth as needed for Pain.     aspirin 325 MG tablet Take 1 tablet (325 mg total) by mouth once daily.    atorvastatin (LIPITOR) 40 MG tablet Take 1 tablet (40 mg total) by mouth once daily.    furosemide (LASIX) 40 MG tablet Take 1 tablet (40 mg total) by mouth 2 (two) times daily.    loratadine (CLARITIN) 10 mg tablet Take 10 mg by mouth once daily.    losartan-hydrochlorothiazide 50-12.5 mg (HYZAAR) 50-12.5 mg per tablet Take 1 tablet by mouth once daily.    metoprolol tartrate (LOPRESSOR) 25 MG tablet Take 1 tablet (25 mg total) by mouth Daily. Takes 0.5mg BID    multivitamin (THERAGRAN) per tablet Take 1 tablet by mouth once daily.    potassium chloride (MICRO-K) 10 MEQ CpSR Take 1 capsule (10 mEq total) by mouth 2 (two) times daily.     No current facility-administered medications for this visit.       Vitamins and Supplements:  MVI, micro-K 10meq BID (lasix 40mg BID)    Labs:  Patient confirms she is taking lipitor 40mg for cholesterol control.    Lab Results   Component Value Date    CHOL 128 12/13/2021     Lab Results   Component Value Date    HDL 53 12/13/2021     Lab Results   Component Value Date    LDLCALC 61.4 (L) 12/13/2021     Lab Results   Component Value Date    TRIG 68 12/13/2021     Lab Results   Component Value Date    CHOLHDL 41.4 12/13/2021         Lab Results   Component Value Date    GLUF 96 12/13/2021     No results found for: HGBA1C    Nutrition/Diet History:  Patient eats 3 meals daily.    Seasons food with Mrs. Nagy.  Patient denies use of a salt shaker at the table on prepared foods.   Dines out 1 per week at restaurants such as [employer], [employer].    Chooses fried foods rarely  Chooses fish rarely   Beverages:  water, coffee  Alcohol: none    24 Hour Recall:  · Breakfast: oatmeal, banana, black coffee x 2  · Lunch:turkey with american cheese, miracle whip/mustard on honey wheat bread, veggie chips  · Dinner:roast with boiled potatoes, emma slaw, peach cobbler  · Other: 3 sandwich  cookies    Difficulty Chewing or Swallowing: no  Current Exercise: See Exercise Physiologist Note  Food Safety/Food Preparation: pt has daughters/helper that prepares food and freezes  Living Arrangements/Family Support: Lives with spouse  Cultural/Spiritual/Personal Preferences: not applicable   Barriers to Education: none identified  Stage of Change Related to Diet Habits: Contemplation    Nutrition Diagnosis:  1. Food and nutrition related knowledge deficit related to the lack of prior nutrition education as evidenced by diet history and 24 hour recall    Nutrition Plan:   Goals:  LDL-C < 70 (for high risk patients)  Hgb A1c < 7%  BMI < 25 and abdominal girth < 40M/<35 F  2 gram sodium, Mediterranean diet  Rehab weight goal: Maintenance  Fish intake (non-fried varieties) to a goal of 2-3 servings per week.   Increase fruit and vegetable intake    Interventions/Recommendations:  Lab results reviewed and discussed  Nutrition Prescription:  · Total Energy Estimated Needs: 9022-2276 Kcal/d for weight maintenance  · Method for Estimating Needs: 25-30kcal/kg  · Total Protein Estimated Needs: 35-59g/d  · Method for Estimating Needs: 0.6-1 g/Kg BW (reduced needs r/t dx CKD III)  · Total Fluid Estimated Needs: 1 mL/Kcal  Dietitian Consult: No  Patient to participate in Cardiac Rehab sessions three times a week  Weekly Dietitian Weight Check  Encouraged patient to complete 3 day food diary  Follow Up Plan for Ongoing Self-Management Support    Education:  Mediterranean Diet; verbalizes understanding; Date: 12/23/21   Person taught: patient and daughter   Preferred Learning Method: Verbal and written   Education Needed/Provided: Nutrition counseling and education related to cardiac rehabilitation   Education Method: Weekly nutrition lectures on the Mediterranean diet, cooking, shopping, and dining out   Written Materials Provided: 3 Day Food Record, Introduction to Mediterranean Diet   Strategies Implemented:  Motivational interviewing, Goal setting, Self-Monitoring, and Problem Solving    Comments:   Discussed ways to incorporate healthy snacks, eating on a schedule, and monitoring sodium intake for heart health.  Encouraged increased produce intake and increased hydration    Diabetes  Is the patient diabetic? No      RD contact information provided.      Brandi Quintero MS, RDN/LDN    Session: Orientation Cardiac Rehab Individual Treatment Plan - Initial Assessment      Patient Name: Nelda Rawls MRN: [id number]   : 1935   Age: 86 y.o.   Date of Event: 10/14/2021   Primary Diagnosis: CABG    EF: unknown    Physical Assessment:   There were no vitals taken for this visit.    ASSESSMENT:  Heart Sounds: regular rate and rhythm, S1, S2 normal, no murmur, click, rub or gallop  Prosthetic Valve: Yes  Lung Sounds: normal air entry, lungs clear to auscultation  Capillary Refill: normal  Left Radial Pulse: Normal (+2)  Right Radial Pulse: Normal (+2)  Left Pedal Pulse: Normal (+2)  Right Pedal Pulse: Normal (+2)  Right Edema: none  Left Edema none  Strength: normal  Range of Motion: full range of motion  Existing Limitations:      Site   [] Arthritis, bursitis    [] Amputation, atrophy    [] Other:    []       Diabetic patient's foot examination comments: None -  not diabetic  Incisional site: CABG site healing well, no drainage or redness noted  Special needs: pt currently ambulating with a walker    Psychosocial Assessment:   Outcome Survey Tools:    CATE SCORES:   PRE   Anxiety 1   Depression 0   Somatic 1   Hostility 0     SF-36 SCORES:   PRE   Physical Function 27   Social Function 7   Mental Health 29   Pain 10   Change in Health 2   Physical Role Limitation 1   Mental Role Limitation 3   Energy/Fatigue 12   Health Perceptions 22   Total Score 113     PHQ-9:  PHQ-9 Depression Patient Health Questionnaire 2021   Over the last two weeks how often have you been bothered by little interest or pleasure in doing  things 0   Over the last two weeks how often have you been bothered by feeling down, depressed or hopeless 0   Over the last two weeks how often have you been bothered by trouble falling or staying asleep, or sleeping too much 0   Over the last two weeks how often have you been bothered by feeling tired or having little energy 2   Over the last two weeks how often have you been bothered by a poor appetite or overeating 0   Over the last two weeks how often have you been bothered by feeling bad about yourself - or that you are a failure or have let yourself or your family down 0   Over the last two weeks how often have you been bothered by trouble concentrating on things, such as reading the newspaper or watching television 0   Over the last two weeks how often have you been bothered by moving or speaking so slowly that other people could have noticed. 0   Over the last two weeks how often have you been bothered by thoughts that you would be better off dead, or of hurting yourself 0   If you checked off any problems, how difficult have these problems made it for you to do your work, take care of things at home or get along with other people? Not difficult at all   Total Score 2              Living Arrangements: Lives with spouse  Family Support: children and paid caregiver  Self Reported: Effective Coping Skills and Stress  Displays: happiness and calmness  Medication: pt does not take medications at this time    Psychosocial Plan:   Goals:  Verbalizes coping mechanisms  Reduce manifestation of stress  Maintain positive support system  Maintain positive outlook  Improve overall quality of life    Interventions/Recommendations:  Discussed Results of Surveys  Patient to Self Report Emotional Changes at Session Check In  Recommend Physical Activity  Recommend Attending Education Lectures  Notify MD: No  Program Referral: No  Pharmaceutical Intervention/Therapy: No  Other Needs: not applicable  Stage of Readiness to  Change: Contemplation    Education:  Stress management, verbalizes understanding; Date:12/23/2021  Stress, verbalizes understanding; Date:12/23/2021    Comments:  Pt caring for her , verbalized increased stress with hurricaine. Pt instructed on resources for managing stress verbalized understanding.   Patient has been instructed to notify staff in the event that circumstances worsen.  Patient verbalizes understanding.    Other Core Components/Risk Factors Assessment:   RISK FACTORS:  hyperlipidemia, hypertension, positive family history, sedentary lifestyle, stress    Learning Barriers: None    Education Level:  Unknown    Pre-test Score: 50    Medication Compliance: has been compliant with taking medications    Other Core Components/Risk Factors Plan:   Goals:  Increase exercise tolerance: In Progress  Increase knowledge of CAD: In Progress  Decrease blood pressure: In Progress  Identify and manage personal areas of stress: In Progress  Learn more about healthy eating: In Progress    Interventions/Recommendations:  Recommend regular attendance for Cardiac Rehab: Exercise and Education Lectures  Encourage medication compliance  Individual Education/ Counseling: No  Physician Referral: No    Education:    blood pressure meds, verbalizes understanding; Date: 12/23/2021  cholesterol, verbalizes understanding; Date: 12/23/2021  cholesterol meds, verbalizes understanding; Date: 12/23/2021  hypertension, verbalizes understanding; Date: 12/23/2021  physical activity, verbalizes understanding; Date: 12/23/2021  risk factors, verbalizes understanding; Date: 12/23/2021  stress, verbalizes understanding; Date: 12/23/2021        Education method adapted to patients education level and preferred method of learning.  Method: explanation    Comments:  Pt instructed on risk factors, medication compliance, importance of exercise, benefits of cardiac rehab, reduction of stress and effects on the body. Pt verbalized  understanding.    Other Core Components/Hypertension Assessment:   Resting BP:   BP Readings from Last 1 Encounters:   04/14/22 (!) 165/76         BP Diagnosis: Hypertensive  Patient reported symptoms: none    Other Core Components/Hypertension Plan:   Goals:  Blood Pressure <130/80    Interventions/Recommendations:  Encourage medication compliance  Recommend physical activity  Educate on contributory factors  Reduce stress, anxiety, anger, depression, and/or chronic pain    Education:    Hypertension; verbalizes understanding; Date: 12/23/2021  Coronary Artery Disease; verbalizes understanding; Date: 12/23/2021  Risk Factors; verbalizes understanding; Date: 12/23/2021  Stress; verbalizes understanding; Date: 12/23/2021        Comments:  Pt reports compliance with medications. Pt instructed on taking medications prior to cardiac rehab, monitoring blood pressure, exercise to reduce stress levels; pt verbalized understanding.       Does the patient have Heart Failure? No    Other Core Components/Tobacco Cessation Assessment:   Smoking Status: lifetime non-smoker  Smoking Cessation Barriers: NA  Stage of Readiness to Change: Maintenance    Other Core Components/Tobacco Cessation Plan:   Goals:  Maintain non-smoking status    Interventions:  Maintains non-smoking status    Education:    Benefits of Cardiac Rehab; verbalizes understanding; Date: 12/23/2021        Comments:  Pt nonsmoker lifelong.    Discussed Cardiac Rehab program in depth with patient.  Medication list updated per patient & marked as reviewed.  Patient has been instructed to notify staff of any problems while attending rehab (ie: chest pain, shortness of breath, lightheadedness, dizziness).  Patient has been instructed to monitor blood pressure readings outside of rehab & to keep a daily log of the readings.  Patient verbalizes understanding.    Roberth Mota RN  Cardiac Rehab Nurse

## 2022-07-01 ENCOUNTER — TELEPHONE (OUTPATIENT)
Dept: CARDIOLOGY | Facility: CLINIC | Age: 87
End: 2022-07-01
Payer: MEDICARE

## 2022-07-01 NOTE — TELEPHONE ENCOUNTER
Lab okay, reviewed with patient family.  OLGA Subramanian MD    ----- Message from Iza Bruce MA sent at 7/1/2022  9:08 AM CDT -----  The patient daughter Yoselin would like to talk  to you about her mother test results form her labs. Please call [phone]. Thank you.

## 2022-07-04 ENCOUNTER — NURSE TRIAGE (OUTPATIENT)
Dept: ADMINISTRATIVE | Facility: CLINIC | Age: 87
End: 2022-07-04
Payer: MEDICARE

## 2022-07-04 ENCOUNTER — HOSPITAL ENCOUNTER (OUTPATIENT)
Facility: HOSPITAL | Age: 87
Discharge: HOME HEALTH | End: 2022-07-06
Attending: EMERGENCY MEDICINE | Admitting: HOSPITALIST
Payer: MEDICARE

## 2022-07-04 DIAGNOSIS — E87.1 HYPONATREMIA: Primary | ICD-10-CM

## 2022-07-04 DIAGNOSIS — R55 SYNCOPE AND COLLAPSE: ICD-10-CM

## 2022-07-04 DIAGNOSIS — I25.119 CORONARY ARTERY DISEASE INVOLVING NATIVE CORONARY ARTERY OF NATIVE HEART WITH ANGINA PECTORIS: Chronic | ICD-10-CM

## 2022-07-04 DIAGNOSIS — R07.9 CHEST PAIN: ICD-10-CM

## 2022-07-04 DIAGNOSIS — R55 SYNCOPE: ICD-10-CM

## 2022-07-04 DIAGNOSIS — R53.83 FATIGUE, UNSPECIFIED TYPE: ICD-10-CM

## 2022-07-04 DIAGNOSIS — R40.20 LOSS OF CONSCIOUSNESS: ICD-10-CM

## 2022-07-04 PROBLEM — J32.0 LEFT MAXILLARY SINUSITIS: Status: ACTIVE | Noted: 2022-07-04

## 2022-07-04 PROBLEM — D64.9 ANEMIA: Status: ACTIVE | Noted: 2022-07-04

## 2022-07-04 LAB
ALBUMIN SERPL BCP-MCNC: 4.1 G/DL (ref 3.5–5.2)
ALBUMIN SERPL BCP-MCNC: 4.1 G/DL (ref 3.5–5.2)
ALP SERPL-CCNC: 88 U/L (ref 55–135)
ALT SERPL W/O P-5'-P-CCNC: 13 U/L (ref 10–44)
ANION GAP SERPL CALC-SCNC: 11 MMOL/L (ref 8–16)
ANION GAP SERPL CALC-SCNC: 11 MMOL/L (ref 8–16)
AST SERPL-CCNC: 18 U/L (ref 10–40)
BASOPHILS # BLD AUTO: 0.03 K/UL (ref 0–0.2)
BASOPHILS NFR BLD: 0.5 % (ref 0–1.9)
BILIRUB SERPL-MCNC: 0.6 MG/DL (ref 0.1–1)
BILIRUB UR QL STRIP: NEGATIVE
BNP SERPL-MCNC: 274 PG/ML (ref 0–99)
BUN SERPL-MCNC: 27 MG/DL (ref 8–23)
BUN SERPL-MCNC: 27 MG/DL (ref 8–23)
CALCIUM SERPL-MCNC: 9.7 MG/DL (ref 8.7–10.5)
CALCIUM SERPL-MCNC: 9.7 MG/DL (ref 8.7–10.5)
CHLORIDE SERPL-SCNC: 92 MMOL/L (ref 95–110)
CHLORIDE SERPL-SCNC: 92 MMOL/L (ref 95–110)
CLARITY UR REFRACT.AUTO: CLEAR
CO2 SERPL-SCNC: 24 MMOL/L (ref 23–29)
CO2 SERPL-SCNC: 24 MMOL/L (ref 23–29)
COLOR UR AUTO: NORMAL
CREAT SERPL-MCNC: 1.1 MG/DL (ref 0.5–1.4)
CREAT SERPL-MCNC: 1.1 MG/DL (ref 0.5–1.4)
CTP QC/QA: YES
DIFFERENTIAL METHOD: ABNORMAL
EOSINOPHIL # BLD AUTO: 0.1 K/UL (ref 0–0.5)
EOSINOPHIL NFR BLD: 2.3 % (ref 0–8)
ERYTHROCYTE [DISTWIDTH] IN BLOOD BY AUTOMATED COUNT: 13.7 % (ref 11.5–14.5)
EST. GFR  (AFRICAN AMERICAN): 52.9 ML/MIN/1.73 M^2
EST. GFR  (AFRICAN AMERICAN): 52.9 ML/MIN/1.73 M^2
EST. GFR  (NON AFRICAN AMERICAN): 45.9 ML/MIN/1.73 M^2
EST. GFR  (NON AFRICAN AMERICAN): 45.9 ML/MIN/1.73 M^2
ESTIMATED AVG GLUCOSE: 105 MG/DL (ref 68–131)
GLUCOSE SERPL-MCNC: 109 MG/DL (ref 70–110)
GLUCOSE SERPL-MCNC: 109 MG/DL (ref 70–110)
GLUCOSE UR QL STRIP: NEGATIVE
HBA1C MFR BLD: 5.3 % (ref 4–5.6)
HCT VFR BLD AUTO: 32.1 % (ref 37–48.5)
HGB BLD-MCNC: 10.8 G/DL (ref 12–16)
HGB UR QL STRIP: NEGATIVE
IMM GRANULOCYTES # BLD AUTO: 0.02 K/UL (ref 0–0.04)
IMM GRANULOCYTES NFR BLD AUTO: 0.3 % (ref 0–0.5)
KETONES UR QL STRIP: NEGATIVE
LEUKOCYTE ESTERASE UR QL STRIP: NEGATIVE
LYMPHOCYTES # BLD AUTO: 1.6 K/UL (ref 1–4.8)
LYMPHOCYTES NFR BLD: 26.1 % (ref 18–48)
MCH RBC QN AUTO: 30.1 PG (ref 27–31)
MCHC RBC AUTO-ENTMCNC: 33.6 G/DL (ref 32–36)
MCV RBC AUTO: 89 FL (ref 82–98)
MONOCYTES # BLD AUTO: 0.6 K/UL (ref 0.3–1)
MONOCYTES NFR BLD: 10.4 % (ref 4–15)
NEUTROPHILS # BLD AUTO: 3.7 K/UL (ref 1.8–7.7)
NEUTROPHILS NFR BLD: 60.4 % (ref 38–73)
NITRITE UR QL STRIP: NEGATIVE
NRBC BLD-RTO: 0 /100 WBC
OSMOLALITY SERPL: 275 MOSM/KG (ref 275–295)
OSMOLALITY UR: 240 MOSM/KG (ref 50–1200)
PH UR STRIP: 6 [PH] (ref 5–8)
PHOSPHATE SERPL-MCNC: 3.8 MG/DL (ref 2.7–4.5)
PLATELET # BLD AUTO: 178 K/UL (ref 150–450)
PMV BLD AUTO: 9.4 FL (ref 9.2–12.9)
POTASSIUM SERPL-SCNC: 3.8 MMOL/L (ref 3.5–5.1)
POTASSIUM SERPL-SCNC: 3.8 MMOL/L (ref 3.5–5.1)
PROT SERPL-MCNC: 7.6 G/DL (ref 6–8.4)
PROT UR QL STRIP: NEGATIVE
RBC # BLD AUTO: 3.59 M/UL (ref 4–5.4)
SARS-COV-2 RDRP RESP QL NAA+PROBE: NEGATIVE
SODIUM SERPL-SCNC: 127 MMOL/L (ref 136–145)
SODIUM SERPL-SCNC: 127 MMOL/L (ref 136–145)
SODIUM UR-SCNC: 43 MMOL/L (ref 20–250)
SP GR UR STRIP: 1 (ref 1–1.03)
TROPONIN I SERPL DL<=0.01 NG/ML-MCNC: 0.01 NG/ML (ref 0–0.03)
URN SPEC COLLECT METH UR: NORMAL
WBC # BLD AUTO: 6.14 K/UL (ref 3.9–12.7)

## 2022-07-04 PROCEDURE — 99220 PR INITIAL OBSERVATION CARE,LEVL III: ICD-10-PCS | Mod: ,,, | Performed by: HOSPITALIST

## 2022-07-04 PROCEDURE — 94761 N-INVAS EAR/PLS OXIMETRY MLT: CPT

## 2022-07-04 PROCEDURE — 93005 ELECTROCARDIOGRAM TRACING: CPT

## 2022-07-04 PROCEDURE — 83036 HEMOGLOBIN GLYCOSYLATED A1C: CPT | Performed by: PHYSICIAN ASSISTANT

## 2022-07-04 PROCEDURE — 25000003 PHARM REV CODE 250: Performed by: PHYSICIAN ASSISTANT

## 2022-07-04 PROCEDURE — 96360 HYDRATION IV INFUSION INIT: CPT

## 2022-07-04 PROCEDURE — 25000003 PHARM REV CODE 250: Performed by: HOSPITALIST

## 2022-07-04 PROCEDURE — 93010 EKG 12-LEAD: ICD-10-PCS | Mod: ,,, | Performed by: INTERNAL MEDICINE

## 2022-07-04 PROCEDURE — G0378 HOSPITAL OBSERVATION PER HR: HCPCS

## 2022-07-04 PROCEDURE — 81003 URINALYSIS AUTO W/O SCOPE: CPT | Performed by: PHYSICIAN ASSISTANT

## 2022-07-04 PROCEDURE — 85025 COMPLETE CBC W/AUTO DIFF WBC: CPT | Performed by: PHYSICIAN ASSISTANT

## 2022-07-04 PROCEDURE — 99285 EMERGENCY DEPT VISIT HI MDM: CPT | Mod: CS,,, | Performed by: EMERGENCY MEDICINE

## 2022-07-04 PROCEDURE — 83935 ASSAY OF URINE OSMOLALITY: CPT | Performed by: PHYSICIAN ASSISTANT

## 2022-07-04 PROCEDURE — 84300 ASSAY OF URINE SODIUM: CPT | Performed by: PHYSICIAN ASSISTANT

## 2022-07-04 PROCEDURE — 84100 ASSAY OF PHOSPHORUS: CPT | Performed by: PHYSICIAN ASSISTANT

## 2022-07-04 PROCEDURE — 96361 HYDRATE IV INFUSION ADD-ON: CPT

## 2022-07-04 PROCEDURE — 99220 PR INITIAL OBSERVATION CARE,LEVL III: CPT | Mod: ,,, | Performed by: HOSPITALIST

## 2022-07-04 PROCEDURE — U0002 COVID-19 LAB TEST NON-CDC: HCPCS | Performed by: PHYSICIAN ASSISTANT

## 2022-07-04 PROCEDURE — 99285 PR EMERGENCY DEPT VISIT,LEVEL V: ICD-10-PCS | Mod: CS,,, | Performed by: EMERGENCY MEDICINE

## 2022-07-04 PROCEDURE — 99285 EMERGENCY DEPT VISIT HI MDM: CPT | Mod: 25,CS

## 2022-07-04 PROCEDURE — 83880 ASSAY OF NATRIURETIC PEPTIDE: CPT | Performed by: PHYSICIAN ASSISTANT

## 2022-07-04 PROCEDURE — 84484 ASSAY OF TROPONIN QUANT: CPT | Performed by: PHYSICIAN ASSISTANT

## 2022-07-04 PROCEDURE — 93010 ELECTROCARDIOGRAM REPORT: CPT | Mod: ,,, | Performed by: INTERNAL MEDICINE

## 2022-07-04 PROCEDURE — 83930 ASSAY OF BLOOD OSMOLALITY: CPT | Performed by: PHYSICIAN ASSISTANT

## 2022-07-04 PROCEDURE — 80053 COMPREHEN METABOLIC PANEL: CPT | Performed by: PHYSICIAN ASSISTANT

## 2022-07-04 RX ORDER — ATORVASTATIN CALCIUM 40 MG/1
40 TABLET, FILM COATED ORAL DAILY
Status: DISCONTINUED | OUTPATIENT
Start: 2022-07-05 | End: 2022-07-06 | Stop reason: HOSPADM

## 2022-07-04 RX ORDER — CALCIUM CARBONATE 200(500)MG
2 TABLET,CHEWABLE ORAL 2 TIMES DAILY PRN
COMMUNITY

## 2022-07-04 RX ORDER — GLUCAGON 1 MG
1 KIT INJECTION
Status: DISCONTINUED | OUTPATIENT
Start: 2022-07-04 | End: 2022-07-06 | Stop reason: HOSPADM

## 2022-07-04 RX ORDER — NALOXONE HCL 0.4 MG/ML
0.02 VIAL (ML) INJECTION
Status: DISCONTINUED | OUTPATIENT
Start: 2022-07-04 | End: 2022-07-06 | Stop reason: HOSPADM

## 2022-07-04 RX ORDER — DOXYCYCLINE 50 MG/1
100 CAPSULE ORAL EVERY 12 HOURS
Status: DISCONTINUED | OUTPATIENT
Start: 2022-07-04 | End: 2022-07-06 | Stop reason: HOSPADM

## 2022-07-04 RX ORDER — ACETAMINOPHEN 325 MG/1
650 TABLET ORAL EVERY 6 HOURS PRN
Status: DISCONTINUED | OUTPATIENT
Start: 2022-07-04 | End: 2022-07-06 | Stop reason: HOSPADM

## 2022-07-04 RX ORDER — OXYMETAZOLINE HCL 0.05 %
1-2 SPRAY, NON-AEROSOL (ML) NASAL DAILY PRN
COMMUNITY

## 2022-07-04 RX ORDER — IBUPROFEN 200 MG
24 TABLET ORAL
Status: DISCONTINUED | OUTPATIENT
Start: 2022-07-04 | End: 2022-07-06 | Stop reason: HOSPADM

## 2022-07-04 RX ORDER — TALC
6 POWDER (GRAM) TOPICAL NIGHTLY PRN
Status: DISCONTINUED | OUTPATIENT
Start: 2022-07-04 | End: 2022-07-06 | Stop reason: HOSPADM

## 2022-07-04 RX ORDER — IBUPROFEN 200 MG
16 TABLET ORAL
Status: DISCONTINUED | OUTPATIENT
Start: 2022-07-04 | End: 2022-07-06 | Stop reason: HOSPADM

## 2022-07-04 RX ORDER — SODIUM CHLORIDE 0.9 % (FLUSH) 0.9 %
10 SYRINGE (ML) INJECTION
Status: DISCONTINUED | OUTPATIENT
Start: 2022-07-04 | End: 2022-07-06 | Stop reason: HOSPADM

## 2022-07-04 RX ADMIN — DOXYCYCLINE 100 MG: 50 CAPSULE ORAL at 08:07

## 2022-07-04 RX ADMIN — ACETAMINOPHEN 650 MG: 325 TABLET ORAL at 10:07

## 2022-07-04 RX ADMIN — SODIUM CHLORIDE 500 ML: 0.9 INJECTION, SOLUTION INTRAVENOUS at 01:07

## 2022-07-04 NOTE — ASSESSMENT & PLAN NOTE
CAD s/p  CABG x 3V on 10/14/21. continue ASA. hold  metoprolol until cardiac device interrogation

## 2022-07-04 NOTE — TELEPHONE ENCOUNTER
Yoselin, Nelda's daughter calling states she is not with Nelda right now but reports urgent symptoms. States Nelda experiencing  intermittent weakness, low blood pressure since last week. Had labs drawn recently and low Na+ level. Yoselin states her  father told her today that Nelda passed out yesterday and was hard to awaken, woke up on her own with no recollection of passing out. Reports BP approximately 1 hour ago was 113/55. Nelda still feels weak to stand. Advised per triage protocol to call  now. V/u.     Reason for Disposition   [1] Caller is not with the adult (patient) AND [2] reporting urgent symptoms   [1] Fainted > 15 minutes ago AND [2] still feels too weak or dizzy to stand    Additional Information   Negative: SEVERE difficulty breathing (e.g., struggling for each breath, speaks in single words)   Negative: Shock suspected (e.g., cold/pale/clammy skin, too weak to stand, low BP, rapid pulse)   Negative: Difficult to awaken or acting confused (e.g., disoriented, slurred speech)    Protocols used: INFORMATION ONLY CALL - NO TRIAGE-A-AH, WEAKNESS (GENERALIZED) AND FATIGUE-A-AH

## 2022-07-04 NOTE — ASSESSMENT & PLAN NOTE
Chronic, controlled.  Latest blood pressure and vitals reviewed-   Temp:  [98.1 °F (36.7 °C)]   Pulse:  [68-80]   Resp:  [18]   BP: (153-173)/(66-82)   SpO2:  [99 %-100 %] .   Home meds for hypertension were reviewed -lasix, hyzar and metoprolol held.   PRN meds if BP> 180/110 mm HG

## 2022-07-04 NOTE — ASSESSMENT & PLAN NOTE
patient used the restroom on 7/3/22  and was walking out of the bathroom had an episode of unwitnessed syncope -  was found on the floor by .  Patient  does not recall episode and denies feeling of presyncope.  reportedly has loss of consiousness for  several minutes. susequently, daughters arrived and got her off the floor.     ER course - Sodium at 127, received NS bolus of 500ml x 1 .pacemaker interrogation requested       Check 2D echo and  orthostatic vitals. Continue telemetry to rule out arrhythmia. Gentle IV hydration, Fall precautions

## 2022-07-04 NOTE — ASSESSMENT & PLAN NOTE
Patient's with Normocytic anemia.. Hemoglobin stable. Etiology likely due to chronic disease .  Current CBC reviewed-  Recent Labs   Lab 06/30/22  0834 07/04/22  1237   HGB 12.2 10.8*       Monitor CBC and transfuse if H/H drops below 7/21.

## 2022-07-04 NOTE — HPI
Miss Nelda Rawls is a 86 y.o. 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 Yesterday, patient used the restroom and was walking out of the bathroom had an episode of unwitnessed syncope  -  was found on the floor by .. Patient  does not recall episode and denies feeling of presyncope.  reportedly has loss of consiousness for  several minutes. susequently, daughters arrived and got her off the floor. believes she fell on her tailbone as it it sore. reported weakness after  eating breakfast today and  not able to stand up. systolic BP was 95 earlier  this AM.  patient reports issues with hypernatremia and hyponatremia around the time of her surgery last year.   reports normal oral intake. compliant with home medications.  on Losartan/HCTZ  daily since april, but took 5 doses of lasix 20mg prior to this admission for LE edema.   denies chest pain. lives alone  with  on hospice. ADL independent at baseline      ER course - Sodium at 127, received NS bolus of 500ml x 1. pacemaker interrogation requested

## 2022-07-04 NOTE — ED TRIAGE NOTES
"Nelda Rawls, a 86 y.o. female presents to the ED w/ complaint of weakness. Pt and family endorse increasing weakness, malaise in the past week w/ reported syncopal episode yesterday. Episode was unwitnessed; Found on floor,  reported that she was difficult to arouse. Pt has no memory of event. Prior to LOC, pt was using bathroom. C/o of "tail bone" pain, believes she fell back onto her backside during episode.  Significant cardiac hx, pacemaker placed in 2021. Denies SOB, chest pain, recent illness, headache, vision changes. Endorses compliance to medications.  currently on hospice, daughter at bedside.     Triage note:  Chief Complaint   Patient presents with    Fatigue     Endorses weakness and malaise x1 week. Per family, pt had a syncopal episode yesterday.      Review of patient's allergies indicates:   Allergen Reactions    Penicillins Hives and Swelling     No past medical history on file.    " Detail Level: Simple Additional Notes: Patient consent was obtained to proceed with the visit and recommended plan of care after discussion of all risks and benefits, including the risks of COVID-19 exposure.

## 2022-07-04 NOTE — ASSESSMENT & PLAN NOTE
Creatine stable for now. BMP reviewed- noted Estimated Creatinine Clearance: 30.8 mL/min (based on SCr of 1.1 mg/dL). according to latest data. Monitor UOP and serial BMP and adjust therapy as needed. Renally dose meds.    Recent Labs   Lab 06/30/22  0834 07/04/22  1237   BUN 20 27*   CREATININE 1.0 1.1

## 2022-07-04 NOTE — PHARMACY MED REC
"Admission Medication History     The home medication history was taken by Glenny Lin.    You may go to "Admission" then "Reconcile Home Medications" tabs to review and/or act upon these items.      The home medication list has been updated by the Pharmacy department.    Please read ALL comments highlighted in yellow.    Please address this information as you see fit.     Feel free to contact us if you have any questions or require assistance.      The medications listed below were removed from the home medication list. Please reorder if appropriate:  Patient reports no longer taking the following medication(s):   POTASSIUM CHLORIDE 10 MEQ    Medications listed below were obtained from: Patient/family    Medication Sig    acetaminophen (TYLENOL) 325 MG tablet   Take 325 mg by mouth daily as needed for Pain.    aspirin 325 MG tablet   Take 1 tablet (325 mg total) by mouth once daily.    atorvastatin (LIPITOR) 40 MG tablet   Take 1 tablet (40 mg total) by mouth once daily.    calcium carbonate (TUMS) 200 mg calcium (500 mg) chewable tablet   Take 2 tablets by mouth 2 (two) times daily as needed for Heartburn.    loratadine (CLARITIN) 10 mg tablet   Take 10 mg by mouth once daily.    losartan-hydrochlorothiazide 50-12.5 mg (HYZAAR) 50-12.5 mg per tablet   Take 1 tablet by mouth once daily.    metoprolol tartrate (LOPRESSOR) 25 MG tablet   Take 25 mg by mouth Daily.    multivitamin (THERAGRAN) per tablet   Take 1 tablet by mouth once daily.    oxymetazoline (AFRIN) 0.05 % nasal spray   1-2 sprays by Nasal route daily as needed for Congestion.    furosemide (LASIX) 40 MG tablet Take 40 mg by mouth daily as needed.         Glenny Lin  EXT [id number]                  .          "

## 2022-07-04 NOTE — ASSESSMENT & PLAN NOTE
Patient admitted without  signs and symptoms of CHF exacerbation. hold lasix.    Results for orders placed or performed during the hospital encounter of 07/04/22   Brain natriuretic peptide   Result Value Ref Range     (H) 0 - 99 pg/mL    Telemetry monitoring. Strict input/ Output monitor, Daily weights.    denies chestpain.  Obtain serial troponins.  Cardiac Enzymes trend  Recent Labs   Lab 07/04/22  1237   TROPONINI 0.010     No results found for this or any previous visit.  obtain Echo

## 2022-07-04 NOTE — H&P
[facility] - Emergency Dept  [facility]  History & Physical    Patient Name: Nelda Rwals  MRN: [id number]  Patient Class: OP- Observation  Admission Date: 7/4/2022  Attending Physician: Raheel Nguyen MD   Primary Care Provider: Jayleen Lancaster MD         Patient information was obtained from patient and ER records.     Subjective:     Principal Problem:Syncope and collapse    Chief Complaint:   Chief Complaint   Patient presents with    Fatigue     Endorses weakness and malaise x1 week. Per family, pt had a syncopal episode yesterday.         HPI: Miss Nelda Rawls is a 86 y.o. female  with PMH Of HTN, HLD, PVD CAD and critical AS s/p Bioprosthetic SAVR and CABG x 3V on 10/14/21, postop she developed post-op CHB; and underwent implantation ofr Medtronic CRT-P (RA, RV, LBB) on 10/21/21 in [place] admitted for evaluation of syncope         AAOX 3. Yesterday, patient used the restroom and was walking out of the bathroom had an episode of unwitnessed syncope  -  was found on the floor by .. Patient  does not recall episode and denies feeling of presyncope.  reportedly has loss of consiousness for  several minutes. susequently, daughters arrived and got her off the floor. believes she fell on her tailbone as it it sore. reported weakness after  eating breakfast today and  not able to stand up. systolic BP was 95 earlier  this AM.  patient reports issues with hypernatremia and hyponatremia around the time of her surgery last year.   reports normal oral intake. compliant with home medications.  on Losartan/HCTZ  daily since april, but took 5 doses of lasix 20mg prior to this admission for LE edema.   denies chest pain. lives alone  with  on hospice. ADL independent at baseline      ER course - Sodium at 127, received NS bolus of 500ml x 1. pacemaker interrogation requested            Review of patient's allergies indicates:   Allergen Reactions    Penicillins Hives and Swelling      No past medical  history on file.        Past Surgical History:   Procedure Laterality Date    CORONARY ARTERY BYPASS GRAFT          No family history on file.  Social History           Tobacco Use    Smoking status: Never Smoker    Smokeless tobacco: Never Used   Substance Use Topics    Alcohol use: Not Currently           Review of Systems:   Pain scale:    Constitutional:  fever,  chills, headache, vision loss, hearing loss, weight loss, Generalized weakness, falls, loss of smell, loss of taste, poor appetite,  sore throat  Respiratory: cough, shortness of breath.   Cardiovascular: chest pain, dizziness, palpitations, orthopnea, swelling of feet, syncope  Gastrointestinal: nausea, vomiting, abdominal pain, diarrhea, black stool,  blood in stool, change in bowel habits  Genitourinary: hematuria, dysuria, urgency, frequency  Integument/Breast: rash,  pruritis  Hematologic/Lymphatic: easy bruising, lymphadenopathy  Musculoskeletal: arthralgias , myalgias, back pain, neck pain, knee pain  Neurological: confusion, seizures, tremors, slurred speech  Behavioral/Psych:  depression, anxiety, auditory or visual hallucinations     OBJECTIVE:     Physical Exam:  Body mass index is 24.37 kg/m².    Constitutional: Appears well-developed and well-nourished.   Head: Normocephalic and atraumatic.   Neck: Normal range of motion. Neck supple.   Cardiovascular: Normal heart rate.  Regular heart rhythm.  Pulmonary/Chest: Effort normal.   Abdominal: No distension.  No tenderness  Musculoskeletal: Normal range of motion. No edema.   Neurological: Alert and oriented to person, place, and time.   Skin: Skin is warm and dry.   Psychiatric: Normal mood and affect. Behavior is normal.                  Vital Signs  Temp: 97.7 °F (36.5 °C) (07/04/22 1600)  Pulse: 88 (07/04/22 1832)  Resp: 18 (07/04/22 1600)  BP: (Abnormal) 169/71 (07/04/22 1832)  SpO2: 99 % (07/04/22 1832)     24 Hour VS Range    Temp:  [97.7 °F (36.5 °C)-98.1 °F (36.7 °C)]   Pulse:   [68-88]   Resp:  [18]   BP: (153-173)/(66-82)   SpO2:  [99 %-100 %]   No intake or output data in the 24 hours ending 07/04/22 1918      I/O This Shift:  No intake/output data recorded.    Wt Readings from Last 3 Encounters:   07/04/22 58.5 kg (129 lb)   04/14/22 58.8 kg (129 lb 10.1 oz)   12/17/21 59 kg (130 lb)       I have personally reviewed the vitals and recorded Intake/Output     Laboratory/Diagnostic Data:    CBC/Anemia Labs: Coags:    Recent Labs   Lab 06/30/22  0834 07/04/22  1237   WBC 5.46 6.14   HGB 12.2 10.8*   HCT 36.5* 32.1*    178   MCV 91 89   RDW 13.9 13.7    No results for input(s): PT, INR, APTT in the last 168 hours.     Chemistries: ABG:   Recent Labs   Lab 06/30/22  0834 07/04/22  1237   * 127*  127*   K 4.6 3.8  3.8   CL 94* 92*  92*   CO2 29 24  24   BUN 20 27*  27*   CREATININE 1.0 1.1  1.1   CALCIUM 10.0 9.7  9.7   PROT 7.6 7.6   BILITOT 0.5 0.6   ALKPHOS 90 88   ALT 12 13   AST 17 18   PHOS  --  3.8    No results for input(s): PH, PCO2, PO2, HCO3, POCSATURATED, BE in the last 168 hours.     POCT Glucose: HbA1c:    No results for input(s): POCTGLUCOSE in the last 168 hours. Hemoglobin A1C   Date Value Ref Range Status   07/04/2022 5.3 4.0 - 5.6 % Final     Comment:     ADA Screening Guidelines:  5.7-6.4%  Consistent with prediabetes  >or=6.5%  Consistent with diabetes    High levels of fetal hemoglobin interfere with the HbA1C  assay. Heterozygous hemoglobin variants (HbS, HgC, etc)do  not significantly interfere with this assay.   However, presence of multiple variants may affect accuracy.          Cardiac Enzymes: Ejection Fractions:    Recent Labs     07/04/22  1237   TROPONINI 0.010    No results found for: EF       Recent Labs   Lab 07/04/22  1319   COLORU Straw   APPEARANCEUA Clear   PHUR 6.0   SPECGRAV 1.005   PROTEINUA Negative   GLUCUA Negative   KETONESU Negative   BILIRUBINUA Negative   OCCULTUA Negative   NITRITE Negative   LEUKOCYTESUR Negative       No  results found for: PROCAL, LACTATE  BNP (pg/mL)   Date Value   07/04/2022 274 (H)     No results found for: CRP, SEDRATE  No results found for: DDIMER  No results found for: FERRITIN  No results found for: LDH  Troponin I (ng/mL)   Date Value   07/04/2022 0.010     No results found for this or any previous visit.  POC Rapid COVID (no units)   Date Value   07/04/2022 Negative       Microbiology labs for the last week  Microbiology Results (last 7 days)     ** No results found for the last 168 hours. **          Reviewed and noted in plan where applicable- Please see chart for full lab data.    Lines/Drains:       Peripheral IV - Single Lumen 07/04/22 1237 20 G Right Antecubital (Active)   Site Assessment Clean;Dry;Intact;No redness;No swelling 07/04/22 1238   Line Status Blood return noted;Flushed;Saline locked 07/04/22 1238   Dressing Status Clean;Dry;Intact 07/04/22 1238   Dressing Intervention First dressing 07/04/22 1238   Number of days: 0       Imaging  ECG Results          EKG 12-lead (Final result)  Result time 07/04/22 17:04:24    Final result by Interface, Lab In [facility] (07/04/22 17:04:24)             Narrative:    Test Reason : R40.20,    Vent. Rate : 066 BPM     Atrial Rate : 066 BPM     P-R Int : 120 ms          QRS Dur : 144 ms      QT Int : 440 ms       P-R-T Axes : 071 018 -74 degrees     QTc Int : 461 ms    AV dual-paced rhythm also with atrial sensed ventricular pacing  Abnormal ECG  When compared with ECG of 14-APR-2022 11:07,  No significant change was found  Confirmed by SHERRY MOYA MD ([id number]) on 7/4/2022 5:04:12 PM    Referred By: [email or web address]   SELF           Confirmed By:SHERRY MOYA MD                            No results found for this or any previous visit.      X-Ray Chest AP Portable  Narrative: EXAMINATION:  XR CHEST AP PORTABLE    CLINICAL HISTORY:  Unspecified coma    TECHNIQUE:  Single frontal view of the chest was performed.    COMPARISON:  None.    FINDINGS:  Left chest wall AICD  with leads projecting over the cardiac silhouette.  Mediastinal structures are midline.  Cardiac silhouette is normal in size.  There are postoperative changes of CABG.  Lung volumes are normal and symmetric.  No consolidation.  No pneumothorax or large pleural effusion.  No free air beneath the diaphragm.  Sternotomy wires are well aligned.  There are degenerative changes of the spine.  No acute osseous abnormalities.  Visualized soft tissues are within normal limits.  Impression: 1. No acute radiographic findings in the chest on this single view.    Electronically signed by: Neville Dacosta MD  Date:    07/04/2022  Time:    13:43  CT Head Without Contrast  Narrative: EXAMINATION:  CT HEAD WITHOUT CONTRAST    CLINICAL HISTORY:  Head trauma, minor (Age >= 65y);syncope yest;    TECHNIQUE:  Low dose axial CT images obtained throughout the head without the use of intravenous contrast.  Axial, sagittal and coronal reconstructions were performed.    COMPARISON:  None.    FINDINGS:  Intracranial compartment:    Prominence of the ventricles and sulci compatible with  cerebral volume loss. Configuration not suggestive of hydrocephalus.    Mild patchy hypoattenuation in the supratentorial white matter, nonspecific but most likely reflecting chronic small vessel ischemic changes. No recent or remote major vascular distribution infarct. No acute hemorrhage.  No mass effect or midline shift.    No extra-axial blood or fluid collections.    Scattered atherosclerotic calcification about the skull base.    Skull/extracranial contents (limited evaluation):    No displaced calvarial fracture.    The mastoid air cells are clear.  Left frontal osteoma.  Acute and chronic appearing inflammatory changes of the left maxillary sinus.  Impression: No evidence of acute hemorrhage or major vascular distribution infarct.    Mild chronic micro small-vessel ischemic change and cerebral volume loss.    Left maxillary sinusitis.    Electronically  signed by: Slava Tovar MD  Date:    07/04/2022  Time:    13:37    EKG - Atrial-paced rhythm @ 66bpm  with Premature supraventricular complexes  LVH with QRS widening and repolarization abnormality     Labs, Imaging, EKG and Diagnostic results from 7/4/2022 were reviewed.    Medications:  Medication list was reviewed and changes noted under Assessment/Plan.  No current facility-administered medications on file prior to encounter.     Current Outpatient Medications on File Prior to Encounter   Medication Sig Dispense Refill    acetaminophen (TYLENOL) 325 MG tablet Take 325 mg by mouth daily as needed for Pain.      aspirin 325 MG tablet Take 1 tablet (325 mg total) by mouth once daily. 90 tablet 3    atorvastatin (LIPITOR) 40 MG tablet Take 1 tablet (40 mg total) by mouth once daily. 90 tablet 3    calcium carbonate (TUMS) 200 mg calcium (500 mg) chewable tablet Take 2 tablets by mouth 2 (two) times daily as needed for Heartburn.      loratadine (CLARITIN) 10 mg tablet Take 10 mg by mouth once daily.      losartan-hydrochlorothiazide 50-12.5 mg (HYZAAR) 50-12.5 mg per tablet Take 1 tablet by mouth once daily. 90 tablet 3    metoprolol tartrate (LOPRESSOR) 25 MG tablet Take 1 tablet (25 mg total) by mouth Daily. Takes 0.5mg BID (Patient taking differently: Take 25 mg by mouth Daily.) 90 tablet 3    multivitamin (THERAGRAN) per tablet Take 1 tablet by mouth once daily.      oxymetazoline (AFRIN) 0.05 % nasal spray 1-2 sprays by Nasal route daily as needed for Congestion.      furosemide (LASIX) 40 MG tablet Take 1 tablet (40 mg total) by mouth 2 (two) times daily. (Patient taking differently: Take 40 mg by mouth daily as needed.) 90 tablet 3    [DISCONTINUED] potassium chloride (MICRO-K) 10 MEQ CpSR Take 1 capsule (10 mEq total) by mouth 2 (two) times daily. 180 capsule 3     Scheduled Medications:  [START ON 7/5/2022] atorvastatin, 40 mg, Oral, Daily  doxycycline, 100 mg, Oral, Q12H      PRN: dextrose  10%, dextrose 10%, glucagon (human recombinant), glucose, glucose, naloxone  Infusions:   Estimated Creatinine Clearance: 30.8 mL/min (based on SCr of 1.1 mg/dL).  Assessment/Plan:     * Syncope and collapse  patient used the restroom on 7/3/22  and was walking out of the bathroom had an episode of unwitnessed syncope -  was found on the floor by .  Patient  does not recall episode and denies feeling of presyncope.  reportedly has loss of consiousness for  several minutes. susequently, daughters arrived and got her off the floor.     ER course - Sodium at 127, received NS bolus of 500ml x 1 .pacemaker interrogation requested       Check 2D echo and  orthostatic vitals. Continue telemetry to rule out arrhythmia. Gentle IV hydration, Fall precautions    Left maxillary sinusitis  started on doxycycline BID      Anemia      Patient's with Normocytic anemia.. Hemoglobin stable. Etiology likely due to chronic disease .  Current CBC reviewed-  Recent Labs   Lab 06/30/22  0834 07/04/22  1237   HGB 12.2 10.8*       Monitor CBC and transfuse if H/H drops below 7/21.       Hyponatremia  sodium at 127 . patient reports issues with hypernatremia and hyponatremia around the time of her surgery last year. s.  on Losartan/HCTZ  daily since april, but took 5 doses of lasix 20mg prior to this admission for LE edema.    hold lasix and Losartan/ HCTZ     obtain urine sodium, serum and urine osmolality    History of aortic valve replacement with bioprosthetic valve  continue ASA      Primary hypertension    Chronic, controlled.  Latest blood pressure and vitals reviewed-   Temp:  [98.1 °F (36.7 °C)]   Pulse:  [68-80]   Resp:  [18]   BP: (153-173)/(66-82)   SpO2:  [99 %-100 %] .   Home meds for hypertension were reviewed -lasix, hyzar and metoprolol held.   PRN meds if BP> 180/110 mm HG      Pure hypercholesterolemia  not on statin      Coronary artery disease involving native coronary artery of native heart without angina  pectoris   CAD s/p  CABG x 3V on 10/14/21. continue ASA. hold  metoprolol until cardiac device interrogation     Stage 3a chronic kidney disease  Creatine stable for now. BMP reviewed- noted Estimated Creatinine Clearance: 30.8 mL/min (based on SCr of 1.1 mg/dL). according to latest data. Monitor UOP and serial BMP and adjust therapy as needed. Renally dose meds.    Recent Labs   Lab 06/30/22  0834 07/04/22  1237   BUN 20 27*   CREATININE 1.0 1.1          Congestive heart failure  Patient admitted without  signs and symptoms of CHF exacerbation. hold lasix.    Results for orders placed or performed during the hospital encounter of 07/04/22   Brain natriuretic peptide   Result Value Ref Range     (H) 0 - 99 pg/mL    Telemetry monitoring. Strict input/ Output monitor, Daily weights.    denies chestpain.  Obtain serial troponins.  Cardiac Enzymes trend  Recent Labs   Lab 07/04/22  1237   TROPONINI 0.010     No results found for this or any previous visit.  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

## 2022-07-04 NOTE — ASSESSMENT & PLAN NOTE
sodium at 127 . patient reports issues with hypernatremia and hyponatremia around the time of her surgery last year. s.  on Losartan/HCTZ  daily since april, but took 5 doses of lasix 20mg prior to this admission for LE edema.    hold lasix and Losartan/ HCTZ     obtain urine sodium, serum and urine osmolality

## 2022-07-04 NOTE — ASSESSMENT & PLAN NOTE
critical AS s/p Bioprosthetic SAVR and CABG x 3V on 10/14/21, postop she developed post-op CHB; and underwent implantation ofr Medtronic CRT-P (RA, RV, LBB) on 10/21/21 in [place]

## 2022-07-04 NOTE — ED PROVIDER NOTES
"Encounter Date: 7/4/2022       History     Chief Complaint   Patient presents with    Fatigue     Endorses weakness and malaise x1 week. Per family, pt had a syncopal episode yesterday.      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  This was all done in [place].  Yesterday, patient used the restroom and was walking out of the bathroom when she syncopized, was found on the floor by . She does not recall episode and denies feeling of presyncope. Down for "atleast several minutes and her  had a hard time waking her up". By the time of daughters arrival, they got her off the floor. Patient denies chest pain, SOB or abdominal pain yesterday. She believes she fell on her tailbone as it it sore.  Today, she had finished eating breakfast when she felt too weak to stand up. This is very unusual for her. daughter also states her systolic BP was 95 earlier today. Denies any CP, SOB, abd pain, headache, neck pain. Eating and making urine without issue. Took anti-HTN meds today. No fever or cough.  Of note, Na;Cl was 130;94 several days ago on outpatient labs.  The patients available PMH, PSH, Social History, medications, allergies, and triage vital signs were reviewed just prior to their medical evaluation.  A ten point review of systems was completed and is negative except as documented above.  Patient denies any other acute medical complaint.          Review of patient's allergies indicates:   Allergen Reactions    Penicillins Hives and Swelling     No past medical history on file.  Past Surgical History:   Procedure Laterality Date    CORONARY ARTERY BYPASS GRAFT       No family history on file.  Social History     Tobacco Use    Smoking status: Never Smoker    Smokeless tobacco: Never Used "   Substance Use Topics    Alcohol use: Not Currently     Review of Systems   Constitutional: Negative for chills and fever.   HENT: Negative for sore throat and trouble swallowing.    Respiratory: Negative for cough and shortness of breath.    Cardiovascular: Negative for chest pain.   Gastrointestinal: Negative for abdominal pain, constipation, diarrhea, nausea and vomiting.   Genitourinary: Negative for dysuria, flank pain, frequency and hematuria.   Musculoskeletal: Negative for back pain and myalgias.   Skin: Negative for rash and wound.   Neurological: Positive for syncope and weakness (gen). Negative for dizziness, numbness and headaches.   Hematological: Does not bruise/bleed easily.   Psychiatric/Behavioral: Negative for confusion.       Physical Exam     Initial Vitals [07/04/22 1203]   BP Pulse Resp Temp SpO2   (!) 164/82 80 18 98.1 °F (36.7 °C) 99 %      MAP       --         Physical Exam    Nursing note and vitals reviewed.  Constitutional: She appears well-developed and well-nourished. She is not diaphoretic. No distress (well appearing).   HENT:   Head: Normocephalic and atraumatic.   Right Ear: External ear normal.   Left Ear: External ear normal.   Mouth/Throat: Oropharynx is clear and moist.   Eyes: Conjunctivae and EOM are normal. Pupils are equal, round, and reactive to light. No scleral icterus.   Neck: No JVD present.   Cardiovascular: Normal rate, regular rhythm and intact distal pulses.   Murmur heard.  Pulmonary/Chest: Breath sounds normal. No respiratory distress. She has no wheezes. She has no rhonchi. She has no rales.   Abdominal: Abdomen is soft. Bowel sounds are normal. There is no abdominal tenderness. There is no rebound and no guarding.   Musculoskeletal:         General: No edema. Normal range of motion.     Neurological: She is alert and oriented to person, place, and time. She has normal strength. No cranial nerve deficit or sensory deficit.   Skin: Skin is warm and dry.  Capillary refill takes less than 2 seconds. No rash noted. No erythema. No pallor.   Psychiatric: She has a normal mood and affect. Her behavior is normal. Judgment and thought content normal.         ED Course   Procedures  Labs Reviewed   CBC W/ AUTO DIFFERENTIAL - Abnormal; Notable for the following components:       Result Value    RBC 3.59 (*)     Hemoglobin 10.8 (*)     Hematocrit 32.1 (*)     All other components within normal limits   COMPREHENSIVE METABOLIC PANEL - Abnormal; Notable for the following components:    Sodium 127 (*)     Chloride 92 (*)     BUN 27 (*)     eGFR if  52.9 (*)     eGFR if non  45.9 (*)     All other components within normal limits   B-TYPE NATRIURETIC PEPTIDE - Abnormal; Notable for the following components:     (*)     All other components within normal limits   RENAL FUNCTION PANEL - Abnormal; Notable for the following components:    Sodium 127 (*)     Chloride 92 (*)     BUN 27 (*)     eGFR if  52.9 (*)     eGFR if non  45.9 (*)     All other components within normal limits    Narrative:     ADD-ON KIA NATH PER GARRICK BERRY MD  07/04/2022  15:42   ADD ON RENAL ORD#[id number] PER CHAD PACKER 07/04/22 @1617   TROPONIN I   URINALYSIS, REFLEX TO URINE CULTURE    Narrative:     Specimen Source->Urine   URINALYSIS, REFLEX TO URINE CULTURE   OSMOLALITY, SERUM   SODIUM, URINE, RANDOM   SODIUM, URINE, RANDOM   OSMOLALITY, SERUM   OSMOLALITY, URINE RANDOM   TROPONIN I   OSMOLALITY, SERUM   HEMOGLOBIN A1C   RENAL FUNCTION PANEL   OSMOLALITY, URINE RANDOM   SODIUM, URINE, RANDOM   HEMOGLOBIN A1C    Narrative:     ADD-ON KIA NATH PER GARRICK BERRY MD  07/04/2022  15:42   ADD ON RENAL ORD#[id number] PER CHAD PACKER 07/04/22 @1617   OSMOLALITY, SERUM    Narrative:     ADD-ON KIA NATH PER GARRICK BERRY MD  07/04/2022  15:42   ADD ON RENAL ORD#[id number] PER CHAD PACKER 07/04/22 @1617   SODIUM, URINE, RANDOM    Narrative:      ADD-ON EITAN FENTON MD  07/04/2022  16:59    OSMOLALITY, URINE RANDOM    Narrative:     ADD-ON EITAN FENTON PER GARRICK BERRY MD  07/04/2022  16:59    SARS-COV-2 RDRP GENE    Narrative:     This test utilizes isothermal nucleic acid amplification   technology to detect the SARS-CoV-2 RdRp nucleic acid segment.   The analytical sensitivity (limit of detection) is 125 genome   equivalents/mL.   A POSITIVE result implies infection with the SARS-CoV-2 virus;   the patient is presumed to be contagious.     A NEGATIVE result means that SARS-CoV-2 nucleic acids are not   present above the limit of detection. A NEGATIVE result should be   treated as presumptive. It does not rule out the possibility of   COVID-19 and should not be the sole basis for treatment decisions.   If COVID-19 is strongly suspected based on clinical and exposure   history, re-testing using an alternate molecular assay should be   considered.   This test is only for use under the Food and Drug   Administration s Emergency Use Authorization (EUA).   Commercial kits are provided by [employer].   Performance characteristics of the EUA have been independently   verified by [facility] Department of   Pathology and Laboratory Medicine.   _________________________________________________________________   The authorized Fact Sheet for Healthcare Providers and the authorized Fact   Sheet for Patients of the ID NOW COVID-19 are available on the FDA   website:     https://www.fda.gov/media/892718/download  https://www.fda.gov/media/365456/download                ECG Results          EKG 12-lead (Final result)  Result time 07/04/22 17:04:24    Final result by Interface, Lab In [facility] (07/04/22 17:04:24)                 Narrative:    Test Reason : R40.20,    Vent. Rate : 066 BPM     Atrial Rate : 066 BPM     P-R Int : 120 ms          QRS Dur : 144 ms      QT Int : 440 ms       P-R-T Axes : 071 018 -74 degrees     QTc  Int : 461 ms    AV dual-paced rhythm also with atrial sensed ventricular pacing  Abnormal ECG  When compared with ECG of 14-APR-2022 11:07,  No significant change was found  Confirmed by SHERRY MOYA MD ([id number]) on 7/4/2022 5:04:12 PM    Referred By: LOAN   SELF           Confirmed By:SHERRY MOYA MD                            Imaging Results          X-Ray Chest AP Portable (Final result)  Result time 07/04/22 13:43:13    Final result by Neville Dacosta MD (07/04/22 13:43:13)                 Impression:      1. No acute radiographic findings in the chest on this single view.      Electronically signed by: Neville Dacosta MD  Date:    07/04/2022  Time:    13:43             Narrative:    EXAMINATION:  XR CHEST AP PORTABLE    CLINICAL HISTORY:  Unspecified coma    TECHNIQUE:  Single frontal view of the chest was performed.    COMPARISON:  None.    FINDINGS:  Left chest wall AICD with leads projecting over the cardiac silhouette.  Mediastinal structures are midline.  Cardiac silhouette is normal in size.  There are postoperative changes of CABG.  Lung volumes are normal and symmetric.  No consolidation.  No pneumothorax or large pleural effusion.  No free air beneath the diaphragm.  Sternotomy wires are well aligned.  There are degenerative changes of the spine.  No acute osseous abnormalities.  Visualized soft tissues are within normal limits.                               CT Head Without Contrast (Final result)  Result time 07/04/22 13:37:51    Final result by Slava Tovar MD (07/04/22 13:37:51)                 Impression:      No evidence of acute hemorrhage or major vascular distribution infarct.    Mild chronic micro small-vessel ischemic change and cerebral volume loss.    Left maxillary sinusitis.      Electronically signed by: Slava Tovar MD  Date:    07/04/2022  Time:    13:37             Narrative:    EXAMINATION:  CT HEAD WITHOUT CONTRAST    CLINICAL HISTORY:  Head trauma, minor (Age >=  65y);syncope yest;    TECHNIQUE:  Low dose axial CT images obtained throughout the head without the use of intravenous contrast.  Axial, sagittal and coronal reconstructions were performed.    COMPARISON:  None.    FINDINGS:  Intracranial compartment:    Prominence of the ventricles and sulci compatible with  cerebral volume loss. Configuration not suggestive of hydrocephalus.    Mild patchy hypoattenuation in the supratentorial white matter, nonspecific but most likely reflecting chronic small vessel ischemic changes. No recent or remote major vascular distribution infarct. No acute hemorrhage.  No mass effect or midline shift.    No extra-axial blood or fluid collections.    Scattered atherosclerotic calcification about the skull base.    Skull/extracranial contents (limited evaluation):    No displaced calvarial fracture.    The mastoid air cells are clear.  Left frontal osteoma.  Acute and chronic appearing inflammatory changes of the left maxillary sinus.                                 Medications   naloxone 0.4 mg/mL injection 0.02 mg (has no administration in time range)   glucose chewable tablet 16 g (has no administration in time range)   glucose chewable tablet 24 g (has no administration in time range)   glucagon (human recombinant) injection 1 mg (has no administration in time range)   dextrose 10% bolus 125 mL (has no administration in time range)   dextrose 10% bolus 250 mL (has no administration in time range)   atorvastatin tablet 40 mg (has no administration in time range)   doxycycline capsule 100 mg (has no administration in time range)   sodium chloride 0.9% bolus 500 mL (0 mLs Intravenous Stopped 7/4/22 1722)     Medical Decision Making:   History:   I obtained history from: someone other than patient.  Old Medical Records: I decided to obtain old medical records.  Old Records Summarized: records from clinic visits and records from previous admission(s).  Initial Assessment:   Patient presents  for syncope without prodrome yesterday +cardiac Hx. +gen weakness, no ACS s/s. VSS, afebrile  Differential Diagnosis:   Dysrhythmia, orthostasis, dehydration, electrolyte abnormality  Physical exam and history taking lower clinical suspicion for ACS, CVA, cardiac tamponade, dissection  Independently Interpreted Test(s):   I have ordered and independently interpreted X-rays - see prior notes.  I have ordered and independently interpreted EKG Reading(s) - see prior notes  Clinical Tests:   Lab Tests: Ordered and Reviewed  Radiological Study: Ordered and Reviewed  Medical Tests: Ordered and Reviewed  ED Management:  Discussed pacemaker interrogation with cardiology- orders placed to be interrogated later today/tomorrow as she is being admitted  Other:   I have discussed this case with another health care provider.             ED Course as of 07/04/22 1937 Mon Jul 04, 2022   1303 Hemoglobin(!): 10.8  Largely near baseline [MF]   1303 Orthostatics neg [MF]   1336 Sodium(!): 127 [MF]   1336 Chloride(!): 92  Given IVF [MF]   1336 eGFR if (!): 52.9 [MF]   1336 BUN(!): 27 [MF]   1336 Troponin I: 0.010 [MF]   1336 BNP(!): 274  No prior for comparison [MF]   1347 CT Head Without Contrast  No acute findings [MF]   1347 X-Ray Chest AP Portable  No acute findings [MF]   1358 Urinalysis, Reflex to Urine Culture Urine, Clean Catch  unremarkable [MF]      ED Course User Index  [MF] Chayo Yadav PA-C         Placed in  observation for hyponatremia and syncope without prodrome, evaluation of cardiac cause/pacemaker interrogation. Patient agreed to plan of care and voiced understanding.    Chayo Yadav PA-C  07/04/2022    I discussed the following case, diagnosis and plan of care with attending physician.      Clinical Impression:   Final diagnoses:  [R40.20] Loss of consciousness  [R55] Syncope  [E87.1] Hyponatremia (Primary)  [R53.83] Fatigue, unspecified type          ED Disposition Condition     Observation               Chayo Yadav PA-C  07/04/22 1939

## 2022-07-05 ENCOUNTER — TELEPHONE (OUTPATIENT)
Dept: CARDIOLOGY | Facility: HOSPITAL | Age: 87
End: 2022-07-05
Payer: MEDICARE

## 2022-07-05 ENCOUNTER — DOCUMENTATION ONLY (OUTPATIENT)
Dept: ELECTROPHYSIOLOGY | Facility: CLINIC | Age: 87
End: 2022-07-05
Payer: MEDICARE

## 2022-07-05 ENCOUNTER — TELEPHONE (OUTPATIENT)
Dept: CARDIOLOGY | Facility: CLINIC | Age: 87
End: 2022-07-05
Payer: MEDICARE

## 2022-07-05 LAB
ALBUMIN SERPL BCP-MCNC: 3.6 G/DL (ref 3.5–5.2)
ALBUMIN SERPL BCP-MCNC: 3.6 G/DL (ref 3.5–5.2)
ALP SERPL-CCNC: 74 U/L (ref 55–135)
ALT SERPL W/O P-5'-P-CCNC: 11 U/L (ref 10–44)
ANION GAP SERPL CALC-SCNC: 10 MMOL/L (ref 8–16)
ANION GAP SERPL CALC-SCNC: 9 MMOL/L (ref 8–16)
ASCENDING AORTA: 2.89 CM
AST SERPL-CCNC: 16 U/L (ref 10–40)
AV INDEX (PROSTH): 0.63
AV MEAN GRADIENT: 7 MMHG
AV PEAK GRADIENT: 12 MMHG
AV VALVE AREA: 2.31 CM2
AV VELOCITY RATIO: 0.63
BASOPHILS # BLD AUTO: 0.04 K/UL (ref 0–0.2)
BASOPHILS NFR BLD: 0.6 % (ref 0–1.9)
BILIRUB SERPL-MCNC: 0.6 MG/DL (ref 0.1–1)
BSA FOR ECHO PROCEDURE: 1.59 M2
BUN SERPL-MCNC: 22 MG/DL (ref 8–23)
BUN SERPL-MCNC: 24 MG/DL (ref 8–23)
CALCIUM SERPL-MCNC: 9.2 MG/DL (ref 8.7–10.5)
CALCIUM SERPL-MCNC: 9.2 MG/DL (ref 8.7–10.5)
CHLORIDE SERPL-SCNC: 100 MMOL/L (ref 95–110)
CHLORIDE SERPL-SCNC: 101 MMOL/L (ref 95–110)
CO2 SERPL-SCNC: 21 MMOL/L (ref 23–29)
CO2 SERPL-SCNC: 22 MMOL/L (ref 23–29)
CREAT SERPL-MCNC: 1 MG/DL (ref 0.5–1.4)
CREAT SERPL-MCNC: 1.2 MG/DL (ref 0.5–1.4)
CV ECHO LV RWT: 0.52 CM
DIFFERENTIAL METHOD: ABNORMAL
DOP CALC AO PEAK VEL: 1.7 M/S
DOP CALC AO VTI: 33.07 CM
DOP CALC LVOT AREA: 3.7 CM2
DOP CALC LVOT DIAMETER: 2.16 CM
DOP CALC LVOT PEAK VEL: 1.07 M/S
DOP CALC LVOT STROKE VOLUME: 76.36 CM3
DOP CALCLVOT PEAK VEL VTI: 20.85 CM
E WAVE DECELERATION TIME: 176.61 MSEC
E/A RATIO: 1.08
E/E' RATIO: 14.94 M/S
ECHO LV POSTERIOR WALL: 0.99 CM (ref 0.6–1.1)
EJECTION FRACTION: 55 %
EOSINOPHIL # BLD AUTO: 0.2 K/UL (ref 0–0.5)
EOSINOPHIL NFR BLD: 2.5 % (ref 0–8)
ERYTHROCYTE [DISTWIDTH] IN BLOOD BY AUTOMATED COUNT: 13.3 % (ref 11.5–14.5)
EST. GFR  (AFRICAN AMERICAN): 47.6 ML/MIN/1.73 M^2
EST. GFR  (AFRICAN AMERICAN): 59.4 ML/MIN/1.73 M^2
EST. GFR  (NON AFRICAN AMERICAN): 41.3 ML/MIN/1.73 M^2
EST. GFR  (NON AFRICAN AMERICAN): 51.5 ML/MIN/1.73 M^2
FRACTIONAL SHORTENING: 18 % (ref 28–44)
GLUCOSE SERPL-MCNC: 101 MG/DL (ref 70–110)
GLUCOSE SERPL-MCNC: 122 MG/DL (ref 70–110)
HCT VFR BLD AUTO: 29.8 % (ref 37–48.5)
HGB BLD-MCNC: 10.3 G/DL (ref 12–16)
IMM GRANULOCYTES # BLD AUTO: 0.01 K/UL (ref 0–0.04)
IMM GRANULOCYTES NFR BLD AUTO: 0.1 % (ref 0–0.5)
INTERVENTRICULAR SEPTUM: 0.95 CM (ref 0.6–1.1)
IVRT: 94.2 MSEC
LA MAJOR: 4.59 CM
LA MINOR: 4.27 CM
LA WIDTH: 3.51 CM
LEFT ATRIUM SIZE: 3.46 CM
LEFT ATRIUM VOLUME INDEX MOD: 30.1 ML/M2
LEFT ATRIUM VOLUME INDEX: 29.1 ML/M2
LEFT ATRIUM VOLUME MOD: 47.23 CM3
LEFT ATRIUM VOLUME: 45.67 CM3
LEFT INTERNAL DIMENSION IN SYSTOLE: 3.11 CM (ref 2.1–4)
LEFT VENTRICLE DIASTOLIC VOLUME INDEX: 39.01 ML/M2
LEFT VENTRICLE DIASTOLIC VOLUME: 61.24 ML
LEFT VENTRICLE MASS INDEX: 71 G/M2
LEFT VENTRICLE SYSTOLIC VOLUME INDEX: 24.3 ML/M2
LEFT VENTRICLE SYSTOLIC VOLUME: 38.17 ML
LEFT VENTRICULAR INTERNAL DIMENSION IN DIASTOLE: 3.78 CM (ref 3.5–6)
LEFT VENTRICULAR MASS: 111.37 G
LV LATERAL E/E' RATIO: 11.55 M/S
LV SEPTAL E/E' RATIO: 21.17 M/S
LYMPHOCYTES # BLD AUTO: 1.7 K/UL (ref 1–4.8)
LYMPHOCYTES NFR BLD: 25.9 % (ref 18–48)
MAGNESIUM SERPL-MCNC: 1.9 MG/DL (ref 1.6–2.6)
MCH RBC QN AUTO: 31.4 PG (ref 27–31)
MCHC RBC AUTO-ENTMCNC: 34.6 G/DL (ref 32–36)
MCV RBC AUTO: 91 FL (ref 82–98)
MONOCYTES # BLD AUTO: 0.7 K/UL (ref 0.3–1)
MONOCYTES NFR BLD: 9.9 % (ref 4–15)
MV A" WAVE DURATION": 9.99 MSEC
MV PEAK A VEL: 1.18 M/S
MV PEAK E VEL: 1.27 M/S
MV STENOSIS PRESSURE HALF TIME: 51.22 MS
MV VALVE AREA P 1/2 METHOD: 4.3 CM2
NEUTROPHILS # BLD AUTO: 4.1 K/UL (ref 1.8–7.7)
NEUTROPHILS NFR BLD: 61 % (ref 38–73)
NRBC BLD-RTO: 0 /100 WBC
PHOSPHATE SERPL-MCNC: 2.9 MG/DL (ref 2.7–4.5)
PHOSPHATE SERPL-MCNC: 3.1 MG/DL (ref 2.7–4.5)
PISA TR MAX VEL: 2.43 M/S
PLATELET # BLD AUTO: 185 K/UL (ref 150–450)
PMV BLD AUTO: 9.8 FL (ref 9.2–12.9)
POTASSIUM SERPL-SCNC: 3.6 MMOL/L (ref 3.5–5.1)
POTASSIUM SERPL-SCNC: 5.2 MMOL/L (ref 3.5–5.1)
PROT SERPL-MCNC: 6.8 G/DL (ref 6–8.4)
PULM VEIN S/D RATIO: 0.79
PV PEAK D VEL: 0.77 M/S
PV PEAK S VEL: 0.61 M/S
RA MAJOR: 4.35 CM
RA PRESSURE: 3 MMHG
RA WIDTH: 3.09 CM
RBC # BLD AUTO: 3.28 M/UL (ref 4–5.4)
RIGHT VENTRICULAR END-DIASTOLIC DIMENSION: 3.3 CM
RV TISSUE DOPPLER FREE WALL SYSTOLIC VELOCITY 1 (APICAL 4 CHAMBER VIEW): 9.53 CM/S
SINUS: 2.99 CM
SODIUM SERPL-SCNC: 131 MMOL/L (ref 136–145)
SODIUM SERPL-SCNC: 132 MMOL/L (ref 136–145)
STJ: 2.91 CM
TDI LATERAL: 0.11 M/S
TDI SEPTAL: 0.06 M/S
TDI: 0.09 M/S
TR MAX PG: 24 MMHG
TRICUSPID ANNULAR PLANE SYSTOLIC EXCURSION: 1.47 CM
TV REST PULMONARY ARTERY PRESSURE: 27 MMHG
WBC # BLD AUTO: 6.69 K/UL (ref 3.9–12.7)

## 2022-07-05 PROCEDURE — 84100 ASSAY OF PHOSPHORUS: CPT | Performed by: HOSPITALIST

## 2022-07-05 PROCEDURE — 85025 COMPLETE CBC W/AUTO DIFF WBC: CPT | Performed by: HOSPITALIST

## 2022-07-05 PROCEDURE — 97161 PT EVAL LOW COMPLEX 20 MIN: CPT

## 2022-07-05 PROCEDURE — 25000003 PHARM REV CODE 250: Performed by: HOSPITALIST

## 2022-07-05 PROCEDURE — 97116 GAIT TRAINING THERAPY: CPT

## 2022-07-05 PROCEDURE — G0378 HOSPITAL OBSERVATION PER HR: HCPCS

## 2022-07-05 PROCEDURE — 36415 COLL VENOUS BLD VENIPUNCTURE: CPT | Performed by: HOSPITALIST

## 2022-07-05 PROCEDURE — 97165 OT EVAL LOW COMPLEX 30 MIN: CPT

## 2022-07-05 PROCEDURE — 99226 PR SUBSEQUENT OBSERVATION CARE,LEVEL III: CPT | Mod: ,,, | Performed by: HOSPITALIST

## 2022-07-05 PROCEDURE — 99226 PR SUBSEQUENT OBSERVATION CARE,LEVEL III: ICD-10-PCS | Mod: ,,, | Performed by: HOSPITALIST

## 2022-07-05 PROCEDURE — 97535 SELF CARE MNGMENT TRAINING: CPT

## 2022-07-05 PROCEDURE — 83735 ASSAY OF MAGNESIUM: CPT | Performed by: HOSPITALIST

## 2022-07-05 PROCEDURE — 80053 COMPREHEN METABOLIC PANEL: CPT | Performed by: HOSPITALIST

## 2022-07-05 RX ORDER — SODIUM CHLORIDE 9 MG/ML
INJECTION, SOLUTION INTRAVENOUS CONTINUOUS
Status: ACTIVE | OUTPATIENT
Start: 2022-07-05 | End: 2022-07-05

## 2022-07-05 RX ORDER — LOSARTAN POTASSIUM 50 MG/1
50 TABLET ORAL DAILY
Status: DISCONTINUED | OUTPATIENT
Start: 2022-07-05 | End: 2022-07-06 | Stop reason: HOSPADM

## 2022-07-05 RX ADMIN — LOSARTAN POTASSIUM 50 MG: 50 TABLET, FILM COATED ORAL at 08:07

## 2022-07-05 RX ADMIN — ATORVASTATIN CALCIUM 40 MG: 40 TABLET, FILM COATED ORAL at 08:07

## 2022-07-05 RX ADMIN — DOXYCYCLINE 100 MG: 50 CAPSULE ORAL at 10:07

## 2022-07-05 RX ADMIN — ACETAMINOPHEN 650 MG: 325 TABLET ORAL at 09:07

## 2022-07-05 RX ADMIN — DOXYCYCLINE 100 MG: 50 CAPSULE ORAL at 09:07

## 2022-07-05 RX ADMIN — SODIUM CHLORIDE: 0.9 INJECTION, SOLUTION INTRAVENOUS at 08:07

## 2022-07-05 NOTE — ASSESSMENT & PLAN NOTE
Chronic, controlled.  Latest blood pressure and vitals reviewed-   Temp:  [97.7 °F (36.5 °C)-98.4 °F (36.9 °C)]   Pulse:  []   Resp:  [18]   BP: (153-173)/(66-82)   SpO2:  [98 %-100 %] .   Home meds for hypertension were reviewed -lasix, hyzar and metoprolol held.   PRN meds if BP> 180/110 mm HG  7/5 SBP in 160s. resume losartan 50mg daily

## 2022-07-05 NOTE — TELEPHONE ENCOUNTER
----- Message from Hannah Osullivan sent at 7/5/2022  1:30 PM CDT -----  Regarding: appt  Pt is scheduled for a device check tomorrow but is still in the hospital.  Pls call her daughter Yoselin at [phone].  She needs to know if you received the info that was supposed to be sent about her device.    Thank you

## 2022-07-05 NOTE — ASSESSMENT & PLAN NOTE
sodium at 127 . patient reports issues with hypernatremia and hyponatremia around the time of her surgery last year. s.  on Losartan/HCTZ  daily since april, but took 5 doses of lasix 20mg prior to this admission for LE edema.    hold lasix and Losartan/ HCTZ     obtain urine sodium, serum and urine osmolality  7/5 sodium improved to 131. NS hydration 75ml/h x 6h .

## 2022-07-05 NOTE — TELEPHONE ENCOUNTER
Patient currently admitted to observation unit and has pacemaker check scheduled tomorrow at [facility]. Device was checked today by industry representative. Left voicemail informing patient/caregiver that will cancel device check tomorrow afternoon since device was checked while at hospital today.

## 2022-07-05 NOTE — PLAN OF CARE
PT eval completed. Pt will begin PT POC.  Gerda Powers, PT  2022    Problem: Physical Therapy  Goal: Physical Therapy Goal  Description: Goals to be met by: 22     Patient will increase functional independence with mobility by performin. Supine to sit with Set-up [place]  2. Sit to supine with Set-up [place]  3. Sit to stand transfer with Supervision  4. Bed to chair transfer with Supervision using Rolling Walker  5. Gait  x 150 feet with Supervision using Rolling Walker.     Outcome: Ongoing, Progressing

## 2022-07-05 NOTE — PROGRESS NOTES
[facility] - Emergency Dept  [facility] Medicine  Progress Note    Patient Name: Nelda Rawls  MRN: [id number]  Patient Class: OP- Observation   Admission Date: 7/4/2022  Length of Stay: 0 days  Attending Physician: Raheel Nguyen MD  Primary Care Provider: Jayleen Lancaster MD        Subjective:     Principal Problem:Syncope and collapse        HPI:  Miss Nelda Rawls is a 86 y.o. female  with PMH Of HTN, HLD, PVD CAD and critical AS s/p Bioprosthetic SAVR and CABG x 3V on 10/14/21, postop she developed post-op CHB; and underwent implantation ofr [employer] CRT-P (RA, RV, LBB) on 10/21/21 in [place] admitted for evaluation of syncope         AAOX 3. Yesterday, patient used the restroom and was walking out of the bathroom had an episode of unwitnessed syncope  -  was found on the floor by .. Patient  does not recall episode and denies feeling of presyncope.  reportedly has loss of consiousness for  several minutes. susequently, daughters arrived and got her off the floor. believes she fell on her tailbone as it it sore. reported weakness after  eating breakfast today and  not able to stand up. systolic BP was 95 earlier  this AM.  patient reports issues with hypernatremia and hyponatremia around the time of her surgery last year.   reports normal oral intake. compliant with home medications.  on Losartan/HCTZ  daily since april, but took 5 doses of lasix 20mg prior to this admission for LE edema.   denies chest pain. lives alone  with  on hospice. ADL independent at baseline      ER course - Sodium at 127, received NS bolus of 500ml x 1. pacemaker interrogation requested       Overview/Hospital Course:  7/5 sodium improved to 131. NS hydration 75ml/h x 6h . echo and cardiac device interrogation today . resume losartan 50mg daily. discussed with cardiology . no events noted on pacemaker interrogation          Review of Systems:   Pain scale:   Constitutional:  fever,  chills, headache, vision loss, hearing  loss, weight loss, Generalized weakness, falls, loss of smell, loss of taste, poor appetite,  sore throat  Respiratory: cough, shortness of breath.   Cardiovascular: chest pain, dizziness, palpitations, orthopnea, swelling of feet, syncope  Gastrointestinal: nausea, vomiting, abdominal pain, diarrhea, black stool,  blood in stool, change in bowel habits  Genitourinary: hematuria, dysuria, urgency, frequency  Integument/Breast: rash,  pruritis  Hematologic/Lymphatic: easy bruising, lymphadenopathy  Musculoskeletal: arthralgias , myalgias, back pain, neck pain, knee pain  Neurological: confusion, seizures, tremors, slurred speech  Behavioral/Psych:  depression, anxiety, auditory or visual hallucinations   OBJECTIVE:     Physical Exam:  Body mass index is 24.37 kg/m².      Constitutional: Appears well-developed and well-nourished.   Head: Normocephalic and atraumatic.   Neck: Normal range of motion. Neck supple.   Cardiovascular: Normal heart rate.  Regular heart rhythm.  Pulmonary/Chest: Effort normal.   Abdominal: No distension.  No tenderness  Musculoskeletal: Normal range of motion. No edema.   Neurological: Alert and oriented to person, place, and time.   Skin: Skin is warm and dry.   Psychiatric: Normal mood and affect. Behavior is normal.                  Vital Signs  Temp: 97.6 °F (36.4 °C) (07/05/22 1914)  Pulse: 84 (07/05/22 1914)  Resp: 18 (07/05/22 1914)  BP: (Abnormal) 159/77 (07/05/22 1914)  SpO2: 97 % (07/05/22 1914)     24 Hour VS Range    Temp:  [97.6 °F (36.4 °C)-99 °F (37.2 °C)]   Pulse:  []   Resp:  [16-18]   BP: (147-169)/(64-77)   SpO2:  [97 %-100 %]   No intake or output data in the 24 hours ending 07/05/22 1929      I/O This Shift:  No intake/output data recorded.    Wt Readings from Last 3 Encounters:   07/05/22 58.5 kg (129 lb)   04/14/22 58.8 kg (129 lb 10.1 oz)   12/17/21 59 kg (130 lb)       I have personally reviewed the vitals and recorded Intake/Output     Laboratory/Diagnostic  Data:    CBC/Anemia Labs: Coags:    Recent Labs   Lab 06/30/22  0834 07/04/22  1237 07/05/22  0321   WBC 5.46 6.14 6.69   HGB 12.2 10.8* 10.3*   HCT 36.5* 32.1* 29.8*    178 185   MCV 91 89 91   RDW 13.9 13.7 13.3    No results for input(s): PT, INR, APTT in the last 168 hours.     Chemistries: ABG:   Recent Labs   Lab 06/30/22  0834 07/04/22  1237 07/05/22  0321   * 127*  127* 131*   K 4.6 3.8  3.8 3.6   CL 94* 92*  92* 100   CO2 29 24  24 22*   BUN 20 27*  27* 22   CREATININE 1.0 1.1  1.1 1.0   CALCIUM 10.0 9.7  9.7 9.2   PROT 7.6 7.6 6.8   BILITOT 0.5 0.6 0.6   ALKPHOS 90 88 74   ALT 12 13 11   AST 17 18 16   MG  --   --  1.9   PHOS  --  3.8 3.1    No results for input(s): PH, PCO2, PO2, HCO3, POCSATURATED, BE in the last 168 hours.     POCT Glucose: HbA1c:    No results for input(s): POCTGLUCOSE in the last 168 hours. Hemoglobin A1C   Date Value Ref Range Status   07/04/2022 5.3 4.0 - 5.6 % Final     Comment:     ADA Screening Guidelines:  5.7-6.4%  Consistent with prediabetes  >or=6.5%  Consistent with diabetes    High levels of fetal hemoglobin interfere with the HbA1C  assay. Heterozygous hemoglobin variants (HbS, HgC, etc)do  not significantly interfere with this assay.   However, presence of multiple variants may affect accuracy.          Cardiac Enzymes: Ejection Fractions:    Recent Labs     07/04/22  1237   TROPONINI 0.010    EF   Date Value Ref Range Status   07/05/2022 55 % Final          Recent Labs   Lab 07/04/22  1319   COLORU Straw   APPEARANCEUA Clear   PHUR 6.0   SPECGRAV 1.005   PROTEINUA Negative   GLUCUA Negative   KETONESU Negative   BILIRUBINUA Negative   OCCULTUA Negative   NITRITE Negative   LEUKOCYTESUR Negative       No results found for: PROCAL, LACTATE  BNP (pg/mL)   Date Value   07/04/2022 274 (H)     No results found for: CRP, SEDRATE  No results found for: DDIMER  No results found for: FERRITIN  No results found for: LDH  Troponin I (ng/mL)   Date Value    07/04/2022 0.010     No results found for this or any previous visit.  POC Rapid COVID (no units)   Date Value   07/04/2022 Negative       Microbiology labs for the last week  Microbiology Results (last 7 days)     ** No results found for the last 168 hours. **          Reviewed and noted in plan where applicable- Please see chart for full lab data.    Lines/Drains:       Peripheral IV - Single Lumen 07/04/22 1237 20 G Right Antecubital (Active)   Site Assessment Clean;Dry;Intact;No redness;No swelling 07/04/22 1238   Line Status Blood return noted;Flushed;Saline locked 07/04/22 1238   Dressing Status Clean;Dry;Intact 07/04/22 1238   Dressing Intervention First dressing 07/04/22 1238   Number of days: 0            Peripheral IV - Single Lumen 07/05/22 0655 22 G Left Hand (Active)   Site Assessment Clean;Dry;Intact 07/05/22 0656   Line Status Blood return noted 07/05/22 0656   Dressing Intervention First dressing 07/05/22 0656   Number of days: 0       Imaging  ECG Results          EKG 12-lead (Final result)  Result time 07/04/22 17:04:24    Final result by Interface, Lab In [facility] (07/04/22 17:04:24)             Narrative:    Test Reason : R40.20,    Vent. Rate : 066 BPM     Atrial Rate : 066 BPM     P-R Int : 120 ms          QRS Dur : 144 ms      QT Int : 440 ms       P-R-T Axes : 071 018 -74 degrees     QTc Int : 461 ms    AV dual-paced rhythm also with atrial sensed ventricular pacing  Abnormal ECG  When compared with ECG of 14-APR-2022 11:07,  No significant change was found  Confirmed by SHERRY MOYA MD ([id number]) on 7/4/2022 5:04:12 PM    Referred By: [email or web address]   SELF           Confirmed By:SHERRY MOYA MD                            No results found for this or any previous visit.      Echo  · The left ventricle is normal in size with concentric remodeling and   normal systolic function.  · The estimated ejection fraction is 55%.  · Indeterminate left ventricular diastolic function.  · Mild mitral  regurgitation.  · Normal right ventricular size with normal right ventricular systolic   function.  · The estimated PA systolic pressure is 27 mmHg.  · Normal central venous pressure (3 mmHg).         Labs, Imaging, EKG and Diagnostic results from 7/5/2022 were reviewed.    Medications:  Medication list was reviewed and changes noted under Assessment/Plan.  No current facility-administered medications on file prior to encounter.     Current Outpatient Medications on File Prior to Encounter   Medication Sig Dispense Refill    acetaminophen (TYLENOL) 325 MG tablet Take 325 mg by mouth daily as needed for Pain.      aspirin 325 MG tablet Take 1 tablet (325 mg total) by mouth once daily. 90 tablet 3    atorvastatin (LIPITOR) 40 MG tablet Take 1 tablet (40 mg total) by mouth once daily. 90 tablet 3    calcium carbonate (TUMS) 200 mg calcium (500 mg) chewable tablet Take 2 tablets by mouth 2 (two) times daily as needed for Heartburn.      loratadine (CLARITIN) 10 mg tablet Take 10 mg by mouth once daily.      losartan-hydrochlorothiazide 50-12.5 mg (HYZAAR) 50-12.5 mg per tablet Take 1 tablet by mouth once daily. 90 tablet 3    metoprolol tartrate (LOPRESSOR) 25 MG tablet Take 1 tablet (25 mg total) by mouth Daily. Takes 0.5mg BID (Patient taking differently: Take 25 mg by mouth Daily.) 90 tablet 3    multivitamin (THERAGRAN) per tablet Take 1 tablet by mouth once daily.      oxymetazoline (AFRIN) 0.05 % nasal spray 1-2 sprays by Nasal route daily as needed for Congestion.      furosemide (LASIX) 40 MG tablet Take 1 tablet (40 mg total) by mouth 2 (two) times daily. (Patient taking differently: Take 40 mg by mouth daily as needed.) 90 tablet 3     Scheduled Medications:  atorvastatin, 40 mg, Oral, Daily  doxycycline, 100 mg, Oral, Q12H  losartan, 50 mg, Oral, Daily      PRN: acetaminophen, dextrose 10%, dextrose 10%, glucagon (human recombinant), glucose, glucose, melatonin, naloxone, sodium chloride  0.9%  Infusions:     Estimated Creatinine Clearance: 33.8 mL/min (based on SCr of 1 mg/dL).    Assessment/Plan:      * Syncope and collapse  patient used the restroom on 7/3/22  and was walking out of the bathroom had an episode of unwitnessed syncope -  was found on the floor by .  Patient  does not recall episode and denies feeling of presyncope.  reportedly has loss of consiousness for  several minutes. susequently, daughters arrived and got her off the floor.     ER course - Sodium at 127, received NS bolus of 500ml x 1 .pacemaker interrogation requested       Check 2D echo and  orthostatic vitals. Continue telemetry to rule out arrhythmia. Gentle IV hydration, Fall precautions  7/5   echo and cardiac device interrogation today .discussed with cardiology . no events noted on pacemaker interrogation    Left maxillary sinusitis  started on doxycycline BID      Anemia      Patient's with Normocytic anemia.. Hemoglobin stable. Etiology likely due to chronic disease .  Current CBC reviewed-  Recent Labs   Lab 06/30/22  0834 07/04/22  1237   HGB 12.2 10.8*       Monitor CBC and transfuse if H/H drops below 7/21.       Hyponatremia  sodium at 127 . patient reports issues with hypernatremia and hyponatremia around the time of her surgery last year. s.  on Losartan/HCTZ  daily since april, but took 5 doses of lasix 20mg prior to this admission for LE edema.    hold lasix and Losartan/ HCTZ     obtain urine sodium, serum and urine osmolality  7/5 sodium improved to 131. NS hydration 75ml/h x 6h .     History of aortic valve replacement with bioprosthetic valve  continue ASA      Primary hypertension    Chronic, controlled.  Latest blood pressure and vitals reviewed-   Temp:  [97.7 °F (36.5 °C)-98.4 °F (36.9 °C)]   Pulse:  []   Resp:  [18]   BP: (153-173)/(66-82)   SpO2:  [98 %-100 %] .   Home meds for hypertension were reviewed -lasix, hyzar and metoprolol held.   PRN meds if BP> 180/110 mm HG  7/5 SBP in  160s. resume losartan 50mg daily      Pure hypercholesterolemia  not on statin      Coronary artery disease involving native coronary artery of native heart without angina pectoris   CAD s/p  CABG x 3V on 10/14/21. continue ASA. hold  metoprolol until cardiac device interrogation     Stage 3a chronic kidney disease  Creatine stable for now. BMP reviewed- noted Estimated Creatinine Clearance: 30.8 mL/min (based on SCr of 1.1 mg/dL). according to latest data. Monitor UOP and serial BMP and adjust therapy as needed. Renally dose meds.    Recent Labs   Lab 06/30/22  0834 07/04/22  1237   BUN 20 27*   CREATININE 1.0 1.1          Congestive heart failure  Patient admitted without  signs and symptoms of CHF exacerbation. hold lasix.    Results for orders placed or performed during the hospital encounter of 07/04/22   Brain natriuretic peptide   Result Value Ref Range     (H) 0 - 99 pg/mL    Telemetry monitoring. Strict input/ Output monitor, Daily weights.    denies chestpain.  Obtain serial troponins.  Cardiac Enzymes trend  Recent Labs   Lab 07/04/22  1237   TROPONINI 0.010     No results found for this or any previous visit.  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    critical AS s/p Bioprosthetic SAVR and CABG x 3V on 10/14/21, postop she developed post-op CHB; and underwent implantation ofr [employer] CRT-P (RA, RV, LBB) on 10/21/21 in [place]     Aortic stenosis, severe   s/p Bioprosthetic SAVR and CABG x 3V on 10/14/21     VTE Risk Mitigation (From admission, onward)         Ordered     IP VTE HIGH RISK PATIENT  Once         07/04/22 2229     Place sequential compression device  Until discontinued         07/04/22 1440                Discharge Planning   GIOVANNI: 7/7/2022     Code Status: Full Code   Is the patient medically ready for discharge?: No    Reason for patient still in hospital (select all that apply): Treatment                     Raheel Nguyen MD  Department  of Hospital Medicine   [facility] - Emergency Dept

## 2022-07-05 NOTE — ED NOTES
Assumed care of pt at this time. VSS, RR even and unlabored. Resting in bed comfortably. No voiced compaints of pain or discomfort at this time. Safety protocols remain, will continue to monitor.     Pt asked for orange juice and assistance to bathroom. Pt was able to stand and walk to bathroom with assistance, and then go back to bed by her own strength. Pt has no complaints at this time.

## 2022-07-05 NOTE — PROGRESS NOTES
Cardiac device interrogation and/or reprogramming completed by industry representative, Yuliya RUIZ with [employer]; please refer to report located in the Media tab.

## 2022-07-05 NOTE — PLAN OF CARE
Problem: Occupational Therapy  Goal: Occupational Therapy Goal  Description: Goals to be met by: 7/18/22     Patient will increase functional independence with ADLs by performing:    UE Dressing with Modified [place].  LE Dressing with Modified [place].  Grooming while standing with Modified [place].  Toileting from toilet with Modified [place] for hygiene and clothing management.   Toilet transfer to toilet with Supervision and LRAD.    Outcome: Ongoing, Progressing   Goals set.

## 2022-07-05 NOTE — HOSPITAL COURSE
7/5 sodium improved to 131. NS hydration 75ml/h x 6h . echo and cardiac device interrogation today . resume losartan 50mg daily. discussed with cardiology . no events noted on pacemaker interrogation  7/6 sodium at 133.continue  NS hydration 75ml/h x 6h .echoleft ventricle is normal in size with concentric remodeling and normal systolic function.The estimated ejection fraction is 55%.Indeterminate left ventricular diastolic function.Mild mitral regurgitation.Normal right ventricular size with normal right ventricular systolic function.The estimated PA systolic pressure is 27 mmHg. Normal central venous pressure (3 mmHg). orthostatic BP today likely discharge home this PM

## 2022-07-05 NOTE — ASSESSMENT & PLAN NOTE
patient used the restroom on 7/3/22  and was walking out of the bathroom had an episode of unwitnessed syncope -  was found on the floor by .  Patient  does not recall episode and denies feeling of presyncope.  reportedly has loss of consiousness for  several minutes. susequently, daughters arrived and got her off the floor.     ER course - Sodium at 127, received NS bolus of 500ml x 1 .pacemaker interrogation requested       Check 2D echo and  orthostatic vitals. Continue telemetry to rule out arrhythmia. Gentle IV hydration, Fall precautions  7/5   echo and cardiac device interrogation today .discussed with cardiology . no events noted on pacemaker interrogation

## 2022-07-05 NOTE — PT/OT/SLP EVAL
Physical Therapy Evaluation    Patient Name:  Nelda Rawls   MRN:  [id number]    Recommendations:     Discharge Recommendations:  home with home health   Discharge Equipment Recommendations: none   Barriers to discharge: None    Assessment:     Nelda Rawls is a 86 y.o. female admitted with a medical diagnosis of Syncope and collapse.  She presents with the following impairments/functional limitations:  impaired self care skills, impaired functional mobilty, gait instability, impaired balance, impaired cardiopulmonary response to activity .     Pt zen session well w/ good participation. PTA she was [place] w/ mobility/ADLs but she is currently limited by the above listed deficits requiring SBA for safety w/ transfers and gait due to mild instability/safety concern. Her vitals were monitored t/o session- noted hypertension following gait trial (185/70mmHg)- nurse was notified. Pt is appropriate for acute PT services and will begin PT POC.    Rehab Prognosis: Good; patient would benefit from acute skilled PT services to address these deficits and reach maximum level of function.    Recent Surgery: * No surgery found *      Plan:     During this hospitalization, patient to be seen 3 x/week to address the identified rehab impairments via gait training, therapeutic activities, therapeutic exercises and progress toward the following goals:    · Plan of Care Expires:  08/03/22    Subjective     Chief Complaint: pain  Patient/Family Comments/goals: to return home  Pain/Comfort:  · Pain Rating 1: 5/10  · Location - Side 1: Bilateral  · Location - Orientation 1: lower  · Location 1: back  · Pain Addressed 1: Pre-medicate for activity, Reposition  · Pain Rating Post-Intervention 1: 5/10    Patients cultural, spiritual, Lutheran conflicts given the current situation: no    Living Environment:  Pt lives w/ her  in a 1SH w/ TTE. She has a walk-in shower w/ a grab bar.  Prior to admission, patients level of function was [place] w/  mobility using a RW. She was also [place] w/ ADLs.  Equipment used at home: walker, rolling, shower chair, grab bar.  DME owned (not currently used): none.  Upon discharge, patient will have assistance from her  but he is only able to provide minimal physical assistance.    Objective:   Co-treatment performed due to patient's multiple deficits requiring two skilled therapists to appropriately and safely assess patient's strength and endurance while facilitating functional tasks in addition to accommodating for patient's activity tolerance.     Communicated with nursing prior to session.  Patient found supine with peripheral IV, blood pressure cuff  upon PT entry to room. Pt's daughter present at end of PT/OT eval.    General Precautions: Standard, fall   Orthopedic Precautions:N/A   Braces: N/A  Respiratory Status: Room air    Exams:  · Cognitive Exam:  Patient is oriented to Person, Place, Time and Situation  · Gross Motor Coordination:  WFL  · Postural Exam:  Patient presented with the following abnormalities:    · -       Rounded shoulders  · -       Forward head  · Sensation:    · -       Intact  light/touch hands/feet  · Skin Integrity/Edema:      · -       Skin integrity: Visible skin intact  · RLE ROM: WFL  · RLE Strength: WFL  · LLE ROM: WFL  · LLE Strength: WFL    Functional Mobility:  · Bed Mobility:   · Supine>sit on bed w/ ModA for trunk  · Sit>supine on bed w/ SBA  · Transfers:    · Sit<>stand to/from EOB, x2 trials, w/ RW and SBA  · Gait:   · ~150ft w/ RW and SBA  · Cues to remain inside RW for stability  · BP= 185/70mmHg following gait trial- session was then ended, pt was returned to supine and nurse was notified    Therapeutic Activities and Exercises:   Pt and daughter were educated on PT role and POC. Both verb understanding.     AM-PAC 6 CLICK MOBILITY  Total Score:18     Patient left supine with all lines intact, call button in reach, nurse notified and daughter present.    GOALS:    Multidisciplinary Problems     Physical Therapy Goals        Problem: Physical Therapy    Goal Priority Disciplines Outcome Goal Variances Interventions   Physical Therapy Goal     PT, PT/OT Ongoing, Progressing     Description: Goals to be met by: 22     Patient will increase functional independence with mobility by performin. Supine to sit with Set-up [place]  2. Sit to supine with Set-up [place]  3. Sit to stand transfer with Supervision  4. Bed to chair transfer with Supervision using Rolling Walker  5. Gait  x 150 feet with Supervision using Rolling Walker.                      History:     No past medical history on file.    Past Surgical History:   Procedure Laterality Date    CORONARY ARTERY BYPASS GRAFT         Time Tracking:     PT Received On: 22  PT Start Time: 0950     PT Stop Time: 1018  PT Total Time (min): 28 min     Billable Minutes: Evaluation 18, Gait Training 10 and Total Time 28      2022

## 2022-07-05 NOTE — PLAN OF CARE
[facility] - Emergency Dept      HOME HEALTH ORDERS  FACE TO FACE ENCOUNTER    Patient Name: Nelda Rawls  YOB: 1935    PCP: Jayleen Lancaster MD   PCP Address: [address] [place] [address] [address]  INTERNAL MEDICINE / [facility]  PCP Phone Number: [phone]  PCP Fax: [phone]    Encounter Date: 7/4/22    Admit to Home Health    Diagnoses:  Active Hospital Problems    Diagnosis  POA    *Syncope and collapse [R55]  Yes    Hyponatremia [E87.1]  Yes    Anemia [D64.9]  Yes    Left maxillary sinusitis [J32.0]  Yes    History of aortic valve replacement with bioprosthetic valve [Z95.3]  Not Applicable     Chronic    Primary hypertension [I10]  Yes     Chronic    Pure hypercholesterolemia [E78.00]  Yes     Chronic    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       Follow Up Appointments:  Future Appointments   Date Time Provider Department Center   7/14/2022  9:30 AM Patrick Subramanian Jr., MD [facility] CARDIO [place]   7/21/2022  1:45 PM [facility] E[facility] [place]       Allergies:  Review of patient's allergies indicates:   Allergen Reactions    Penicillins Hives and Swelling       Medications: Review discharge medications with patient and family and provide education.    Current Facility-Administered Medications   Medication Dose Route Frequency Provider Last Rate Last Admin    0.9%  NaCl infusion   Intravenous Continuous Raheel Nguyen MD        acetaminophen tablet 650 mg  650 mg Oral Q6H PRN Noam Batista MD   650 mg at 07/05/22 2102    atorvastatin tablet 40 mg  40 mg Oral Daily Raheel Nguyen MD    40 mg at 07/05/22 0843    dextrose 10% bolus 125 mL  12.5 g Intravenous PRN Raheel Nguyen MD        dextrose 10% bolus 250 mL  25 g Intravenous PRN Raheel Nguyen MD        doxycycline capsule 100 mg  100 mg Oral Q12H Raheel Nguyen MD   100 mg at 07/05/22 2101    glucagon (human recombinant) injection 1 mg  1 mg Intramuscular PRN Raheel Nguyen MD        glucose chewable tablet 16 g  16 g Oral PRN Raheel Nguyen MD        glucose chewable tablet 24 g  24 g Oral PRN Raheel Nguyen MD        losartan tablet 50 mg  50 mg Oral Daily Raheel Nguyen MD   50 mg at 07/05/22 0843    melatonin tablet 6 mg  6 mg Oral Nightly PRN Chayo Yadav PA-C        naloxone 0.4 mg/mL injection 0.02 mg  0.02 mg Intravenous PRN Raheel Nguyen MD        sodium chloride 0.9% flush 10 mL  10 mL Intravenous PRN Chayo Yadav PA-C         Current Discharge Medication List      START taking these medications    Details   doxycycline (MONODOX) 100 MG capsule Take 1 capsule (100 mg total) by mouth every 12 (twelve) hours. for 5 days  Qty: 10 capsule, Refills: 0      losartan (COZAAR) 50 MG tablet Take 1 tablet (50 mg total) by mouth once daily.  Qty: 90 tablet, Refills: 3    Comments: .      metoprolol succinate (TOPROL-XL) 25 MG 24 hr tablet Take 1 tablet (25 mg total) by mouth once daily.  Qty: 30 tablet, Refills: 11    Comments: .         CONTINUE these medications which have CHANGED    Details   furosemide (LASIX) 40 MG tablet Take 1 tablet (40 mg total) by mouth daily as needed (for shortness of breath/  weight gain of > 2lb).  Qty: 30 tablet, Refills: 1         CONTINUE these medications which have NOT CHANGED    Details   acetaminophen (TYLENOL) 325 MG tablet Take 325 mg by mouth daily as needed for Pain.      aspirin 325 MG tablet Take 1 tablet (325 mg total) by mouth once daily.  Qty: 90 tablet, Refills: 3      atorvastatin (LIPITOR) 40 MG tablet Take 1 tablet (40 mg total) by mouth once  daily.  Qty: 90 tablet, Refills: 3    Associated Diagnoses: Pure hypercholesterolemia      calcium carbonate (TUMS) 200 mg calcium (500 mg) chewable tablet Take 2 tablets by mouth 2 (two) times daily as needed for Heartburn.      loratadine (CLARITIN) 10 mg tablet Take 10 mg by mouth once daily.      multivitamin (THERAGRAN) per tablet Take 1 tablet by mouth once daily.      oxymetazoline (AFRIN) 0.05 % nasal spray 1-2 sprays by Nasal route daily as needed for Congestion.         STOP taking these medications       losartan-hydrochlorothiazide 50-12.5 mg (HYZAAR) 50-12.5 mg per tablet Comments:   Reason for Stopping:         metoprolol tartrate (LOPRESSOR) 25 MG tablet Comments:   Reason for Stopping:                 I have seen and examined this patient within the last 30 days. My clinical findings that support the need for the home health skilled services and home bound status are the following:no   Weakness/numbness causing balance and gait disturbance due to Weakness/Debility making it taxing to leave home.     Diet:   cardiac diet        Referrals/ Consults  Physical Therapy to evaluate and treat. Evaluate for home safety and equipment needs; Establish/upgrade home exercise program. Perform / instruct on therapeutic exercises, gait training, transfer training, and Range of Motion.  Occupational Therapy to evaluate and treat. Evaluate home environment for safety and equipment needs. Perform/Instruct on transfers, ADL training, ROM, and therapeutic exercises.  Aide to provide assistance with personal care, ADLs, and vital signs.    Activities:   activity as tolerated    Nursing:   Agency to admit patient within 24 hours of hospital discharge unless specified on physician order or at patient request    SN to complete comprehensive assessment including routine vital signs. Instruct on disease process and s/s of complications to report to MD. Review/verify medication list sent home with the patient at time of  discharge  and instruct patient/caregiver as needed. Frequency may be adjusted depending on start of care date.     Skilled nurse to perform up to 3 visits PRN for symptoms related to diagnosis    Notify MD if SBP > 160 or < 90; DBP > 90 or < 50; HR > 120 or < 50; Temp > 101; O2 < 88%;     Ok to schedule additional visits based on staff availability and patient request on consecutive days within the home health episode.    When multiple disciplines ordered:    Start of Care occurs on Sunday - Wednesday schedule remaining discipline evaluations as ordered on separate consecutive days following the start of care.    Thursday SOC -schedule subsequent evaluations Friday and Monday the following week.     Friday - Saturday SOC - schedule subsequent discipline evaluations on consecutive days starting Monday of the following week.    For all post-discharge communication and subsequent orders please contact patient's primary care physician. If unable to reach primary care physician or do not receive response within 30 minutes, please contact [phone] for clinical staff order clarification    Miscellaneous   Routine Skin for Bedridden Patients: Instruct patient/caregiver to apply moisture barrier cream to all skin folds and wet areas in perineal area daily and after baths and all bowel movements.    Home Health Aide:  Nursing every 48 hours, Physical Therapy every 48 hours, Occupational Therapy every 48 hours and Home Health Aide every 48 hours    Wound Care Orders  no    I certify that this patient is confined to her home and needs intermittent skilled nursing care, physical therapy and occupational therapy.

## 2022-07-05 NOTE — PT/OT/SLP EVAL
"Occupational Therapy   Co-Evaluation  Co-treatment performed due to patient's multiple deficits requiring two skilled therapists to appropriately and safely assess patient's strength and endurance while facilitating functional tasks in addition to accommodating for patient's activity tolerance.     Name: Nelda Rawls  MRN: [id number]  Admitting Diagnosis:  Syncope and collapse  Recent Surgery: * No surgery found *      Recommendations:     Discharge Recommendations: home with home health  Discharge Equipment Recommendations:  none  Barriers to discharge:  None    Assessment:     Nelda Rawls is a 86 y.o. female with a medical diagnosis of Syncope and collapse. Performance deficits affecting function: impaired functional mobilty, gait instability, impaired balance, impaired cardiopulmonary response to activity, impaired self care skills.      Pt agreeable to PT/OT eval and tolerated session fairly well with good participation. Pt is currently functioning below her baseline in self-care and functional mobility due to deficits listed above. She requires (A) for all ADLs and functional ambulation. Once medically stable, OT recommending HH in order to reduce caregiver burden, increase (I) with functional activities, and facilitate safe discharge.    BP seated EOB: 160/73  BP following gait: 185/70 RN notifed    Rehab Prognosis: Good; patient would benefit from acute skilled OT services to address these deficits and reach maximum level of function.       Plan:     Patient to be seen 2 x/week to address the above listed problems via self-care/home management, therapeutic activities, therapeutic exercises  · Plan of Care Expires: 08/04/22  · Plan of Care Reviewed with: patient, daughter    Subjective   "I had a week long spell being weak"  Chief Complaint: pain  Patient/Family Comments/goals: to go home    Occupational Profile:  Living Environment: Pt lives with her  in a [facility] with threshold. She has a walk-inshower with " shower chair and GB  Previous level of function: PTA, pt was Mod (I) with ADLs and functional mobility using no AD. Pt has a  for IADLs  Equipment Used at Home:  grab bar, walker, rolling, shower chair  Assistance upon Discharge: family    Pain/Comfort:  · Pain Rating 1: 5/10  · Location - Side 1: Bilateral  · Location - Orientation 1: lower  · Location 1: back  · Pain Addressed 1: Pre-medicate for activity, Reposition  · Pain Rating Post-Intervention 1: 5/10    Patients cultural, spiritual, Holiness conflicts given the current situation: no    Objective:     Communicated with: RN prior to session.  Patient found HOB elevated with blood pressure cuff, peripheral IV upon OT entry to room.    Additional staff present: IBIS Lorenz    General Precautions: Standard, fall   Orthopedic Precautions:N/A   Braces: N/A  Respiratory Status: Room air    Occupational Performance:    Bed Mobility:    · Patient completed Scooting/Bridging with stand by assistance  · Patient completed Supine to Sit with moderate assistance  · Patient completed Sit to Supine with stand by assistance    Functional Mobility/Transfers:  · Patient completed Sit <> Stand Transfer with stand by assistance  with  rolling walker    · x2 trials from EOB  · Functional Mobility: Pt performs ambulation ~150 ft with SBA and RW  · Cues for RW management    Activities of Daily Living:  · Lower Body Dressing: stand by assistance as pt performed trunk flexion to adjust L sock over heel    Cognitive/Visual Perceptual:  Cognitive/Psychosocial Skills:     -       Oriented to: Person, Place, Time and Situation   -       Follows Commands/attention:Follows one-step commands  -       Safety awareness/insight to disability: impaired     Physical Exam:  Balance:    -       Good static sitting; Fair+ static standing  Postural examination/scapula alignment:    -       Rounded shoulders  Sensation:    -       Intact  light/touch BUE  Upper Extremity Range of Motion:      -       Right Upper Extremity: WFL  -       Left Upper Extremity: WFL  Upper Extremity Strength:    -       Right Upper Extremity: WFL  -       Left Upper Extremity: WFL    AMPAC 6 Click ADL:  AMPAC Total Score: 20    Treatment & Education:  Provided education regarding role of OT, POC, & discharge recommendations with pt and sonia verbalizing understanding.  Pt had no further questions & when asked whether there were any concerns pt reported none.    Education:    Patient left HOB elevated with all lines intact, call button in reach, RN notified and sonia present    GOALS:   Multidisciplinary Problems     Occupational Therapy Goals        Problem: Occupational Therapy    Goal Priority Disciplines Outcome Interventions   Occupational Therapy Goal     OT, PT/OT Ongoing, Progressing    Description: Goals to be met by: 7/18/22     Patient will increase functional independence with ADLs by performing:    UE Dressing with Modified Mayes.  LE Dressing with Modified Mayes.  Grooming while standing with Modified Mayes.  Toileting from toilet with Modified Mayes for hygiene and clothing management.   Toilet transfer to toilet with Supervision and LRAD.                     History:     No past medical history on file.    Past Surgical History:   Procedure Laterality Date    CORONARY ARTERY BYPASS GRAFT         Time Tracking:     OT Date of Treatment: 07/05/22  OT Start Time: 0950  OT Stop Time: 1018  OT Total Time (min): 28 min    Billable Minutes:Evaluation 18  Self Care/Home Management 10    7/5/2022

## 2022-07-06 VITALS
RESPIRATION RATE: 16 BRPM | HEIGHT: 61 IN | OXYGEN SATURATION: 98 % | SYSTOLIC BLOOD PRESSURE: 161 MMHG | WEIGHT: 129 LBS | BODY MASS INDEX: 24.35 KG/M2 | HEART RATE: 75 BPM | DIASTOLIC BLOOD PRESSURE: 71 MMHG | TEMPERATURE: 97 F

## 2022-07-06 LAB
ALBUMIN SERPL BCP-MCNC: 3.6 G/DL (ref 3.5–5.2)
ALP SERPL-CCNC: 79 U/L (ref 55–135)
ALT SERPL W/O P-5'-P-CCNC: 12 U/L (ref 10–44)
ANION GAP SERPL CALC-SCNC: 9 MMOL/L (ref 8–16)
AST SERPL-CCNC: 15 U/L (ref 10–40)
BASOPHILS # BLD AUTO: 0.04 K/UL (ref 0–0.2)
BASOPHILS NFR BLD: 0.6 % (ref 0–1.9)
BILIRUB SERPL-MCNC: 0.5 MG/DL (ref 0.1–1)
BUN SERPL-MCNC: 19 MG/DL (ref 8–23)
CALCIUM SERPL-MCNC: 9 MG/DL (ref 8.7–10.5)
CHLORIDE SERPL-SCNC: 103 MMOL/L (ref 95–110)
CO2 SERPL-SCNC: 21 MMOL/L (ref 23–29)
CREAT SERPL-MCNC: 0.9 MG/DL (ref 0.5–1.4)
DIFFERENTIAL METHOD: ABNORMAL
EOSINOPHIL # BLD AUTO: 0.2 K/UL (ref 0–0.5)
EOSINOPHIL NFR BLD: 3.6 % (ref 0–8)
ERYTHROCYTE [DISTWIDTH] IN BLOOD BY AUTOMATED COUNT: 13.7 % (ref 11.5–14.5)
EST. GFR  (AFRICAN AMERICAN): >60 ML/MIN/1.73 M^2
EST. GFR  (NON AFRICAN AMERICAN): 58.5 ML/MIN/1.73 M^2
GLUCOSE SERPL-MCNC: 88 MG/DL (ref 70–110)
HCT VFR BLD AUTO: 33 % (ref 37–48.5)
HGB BLD-MCNC: 11.1 G/DL (ref 12–16)
IMM GRANULOCYTES # BLD AUTO: 0.02 K/UL (ref 0–0.04)
IMM GRANULOCYTES NFR BLD AUTO: 0.3 % (ref 0–0.5)
LYMPHOCYTES # BLD AUTO: 2 K/UL (ref 1–4.8)
LYMPHOCYTES NFR BLD: 30.4 % (ref 18–48)
MAGNESIUM SERPL-MCNC: 1.9 MG/DL (ref 1.6–2.6)
MCH RBC QN AUTO: 31 PG (ref 27–31)
MCHC RBC AUTO-ENTMCNC: 33.6 G/DL (ref 32–36)
MCV RBC AUTO: 92 FL (ref 82–98)
MONOCYTES # BLD AUTO: 0.7 K/UL (ref 0.3–1)
MONOCYTES NFR BLD: 10.6 % (ref 4–15)
NEUTROPHILS # BLD AUTO: 3.5 K/UL (ref 1.8–7.7)
NEUTROPHILS NFR BLD: 54.5 % (ref 38–73)
NRBC BLD-RTO: 0 /100 WBC
PHOSPHATE SERPL-MCNC: 2.8 MG/DL (ref 2.7–4.5)
PLATELET # BLD AUTO: 194 K/UL (ref 150–450)
PMV BLD AUTO: 9.9 FL (ref 9.2–12.9)
POTASSIUM SERPL-SCNC: 4 MMOL/L (ref 3.5–5.1)
PROT SERPL-MCNC: 6.6 G/DL (ref 6–8.4)
RBC # BLD AUTO: 3.58 M/UL (ref 4–5.4)
SODIUM SERPL-SCNC: 133 MMOL/L (ref 136–145)
TROPONIN I SERPL DL<=0.01 NG/ML-MCNC: 0.01 NG/ML (ref 0–0.03)
TROPONIN I SERPL DL<=0.01 NG/ML-MCNC: 0.01 NG/ML (ref 0–0.03)
WBC # BLD AUTO: 6.44 K/UL (ref 3.9–12.7)

## 2022-07-06 PROCEDURE — 99217 PR OBSERVATION CARE DISCHARGE: ICD-10-PCS | Mod: ,,, | Performed by: HOSPITALIST

## 2022-07-06 PROCEDURE — 99217 PR OBSERVATION CARE DISCHARGE: CPT | Mod: ,,, | Performed by: HOSPITALIST

## 2022-07-06 PROCEDURE — 83735 ASSAY OF MAGNESIUM: CPT | Performed by: HOSPITALIST

## 2022-07-06 PROCEDURE — 84100 ASSAY OF PHOSPHORUS: CPT | Performed by: HOSPITALIST

## 2022-07-06 PROCEDURE — 93010 ELECTROCARDIOGRAM REPORT: CPT | Mod: ,,, | Performed by: INTERNAL MEDICINE

## 2022-07-06 PROCEDURE — G0378 HOSPITAL OBSERVATION PER HR: HCPCS

## 2022-07-06 PROCEDURE — 96361 HYDRATE IV INFUSION ADD-ON: CPT

## 2022-07-06 PROCEDURE — 36415 COLL VENOUS BLD VENIPUNCTURE: CPT | Performed by: HOSPITALIST

## 2022-07-06 PROCEDURE — 84484 ASSAY OF TROPONIN QUANT: CPT | Performed by: HOSPITALIST

## 2022-07-06 PROCEDURE — 93005 ELECTROCARDIOGRAM TRACING: CPT

## 2022-07-06 PROCEDURE — 80053 COMPREHEN METABOLIC PANEL: CPT | Performed by: HOSPITALIST

## 2022-07-06 PROCEDURE — 85025 COMPLETE CBC W/AUTO DIFF WBC: CPT | Performed by: HOSPITALIST

## 2022-07-06 PROCEDURE — 93010 EKG 12-LEAD: ICD-10-PCS | Mod: ,,, | Performed by: INTERNAL MEDICINE

## 2022-07-06 PROCEDURE — 25000003 PHARM REV CODE 250: Performed by: HOSPITALIST

## 2022-07-06 RX ORDER — SODIUM CHLORIDE 9 MG/ML
INJECTION, SOLUTION INTRAVENOUS CONTINUOUS
Status: ACTIVE | OUTPATIENT
Start: 2022-07-06 | End: 2022-07-06

## 2022-07-06 RX ORDER — METOPROLOL SUCCINATE 25 MG/1
25 TABLET, EXTENDED RELEASE ORAL DAILY
Qty: 30 TABLET | Refills: 11 | Status: SHIPPED | OUTPATIENT
Start: 2022-07-06 | End: 2023-06-26 | Stop reason: SDUPTHER

## 2022-07-06 RX ORDER — MAG HYDROX/ALUMINUM HYD/SIMETH 200-200-20
30 SUSPENSION, ORAL (FINAL DOSE FORM) ORAL ONCE
Status: DISCONTINUED | OUTPATIENT
Start: 2022-07-06 | End: 2022-07-06 | Stop reason: HOSPADM

## 2022-07-06 RX ORDER — LOSARTAN POTASSIUM 50 MG/1
50 TABLET ORAL DAILY
Qty: 90 TABLET | Refills: 3 | Status: SHIPPED | OUTPATIENT
Start: 2022-07-06 | End: 2022-07-14

## 2022-07-06 RX ORDER — DOXYCYCLINE 100 MG/1
100 CAPSULE ORAL EVERY 12 HOURS
Qty: 10 CAPSULE | Refills: 0 | Status: SHIPPED | OUTPATIENT
Start: 2022-07-06 | End: 2022-07-11

## 2022-07-06 RX ORDER — SODIUM CHLORIDE 9 MG/ML
INJECTION, SOLUTION INTRAVENOUS CONTINUOUS
Status: DISCONTINUED | OUTPATIENT
Start: 2022-07-06 | End: 2022-07-06

## 2022-07-06 RX ORDER — FUROSEMIDE 40 MG/1
40 TABLET ORAL DAILY PRN
Qty: 30 TABLET | Refills: 1 | Status: SHIPPED | OUTPATIENT
Start: 2022-07-06 | End: 2022-10-12

## 2022-07-06 RX ORDER — NITROGLYCERIN 0.4 MG/1
0.4 TABLET SUBLINGUAL EVERY 5 MIN PRN
Status: DISCONTINUED | OUTPATIENT
Start: 2022-07-06 | End: 2022-07-06 | Stop reason: HOSPADM

## 2022-07-06 RX ADMIN — ATORVASTATIN CALCIUM 40 MG: 40 TABLET, FILM COATED ORAL at 08:07

## 2022-07-06 RX ADMIN — LOSARTAN POTASSIUM 50 MG: 50 TABLET, FILM COATED ORAL at 08:07

## 2022-07-06 RX ADMIN — SODIUM CHLORIDE: 0.9 INJECTION, SOLUTION INTRAVENOUS at 08:07

## 2022-07-06 RX ADMIN — SODIUM CHLORIDE: 0.9 INJECTION, SOLUTION INTRAVENOUS at 11:07

## 2022-07-06 RX ADMIN — DOXYCYCLINE 100 MG: 50 CAPSULE ORAL at 08:07

## 2022-07-06 NOTE — NURSING
"Called to pt room at 0940 who stated she was experiencing CP. She described as "heaviness and burning". B/P elevated at 207/93. MD notified and new orders obtained. B/P checked with manual device 148/86. Pt declined maalox and NTG and reports pain as resolved. Family at bedside.  "

## 2022-07-06 NOTE — PLAN OF CARE
[facility] - Telemetry Stepdown ([facility]-)  Discharge Final Note    Primary Care Provider: Jayleen Lancaster MD    Expected Discharge Date: 7/6/2022     CM sent Careport referral to [facility]. Agency [facility] contact patient directly with visit details.    Final Discharge Note (most recent)     Final Note - 07/06/22 1631        Final Note    Assessment Type Final Discharge Note     Anticipated Discharge Disposition Home-Health Care Svc        Post-Acute Status    Post-Acute Authorization Home Health     Home Health Status Referrals Sent   Referral to [facility]    Discharge Delays None known at this time               Important Message from Medicare         Contact Info     Jayleen Lancaster MD   Specialty: Internal Medicine   Relationship: PCP - General    [address] [place] [address] INTERNAL MEDICINE  [place] [address]   Phone: [phone]       Next Steps: Follow up on 7/21/2022    Instructions: Hospital follow up visit at 10:20am. Please bring your discharge summary, insurance card, current medications, and picture ID        Chrissy Dewitt RN  Covering  - [facility]  [phone]

## 2022-07-06 NOTE — NURSING
Pt arrived to unit. Patient awake, alert, and oriented x 4. Lying in bed-semi fowlers position. NAD noted. Respirations are even and  unlabored. Tele monitor placed. NS infusing to PIV at 75 ml/hr. Plan of care reviewed. Education provided. Orientation to room performed. Instruction given on use of call light. Bed locked and in lowest position. All safety measures are in place. Communication board updated with direct nursing extension. RN will continue to monitor.

## 2022-07-06 NOTE — PLAN OF CARE
Pt admitted and care plan initiated.Telemetry maintained, paced rhythm.ivf completed.ambulating to br with assistance and tolerating well.no further episodes of syncope.echo completed.bruising noted to tailbone/buttock and hip region.safety precautions implemented.bed in low position.rails up.call  bell in reach.continue plan of care.

## 2022-07-06 NOTE — PT/OT/SLP PROGRESS
Physical Therapy Missed Treatment Note      Patient Name:  Nelda Rawls   MRN:  [id number]    Patient not seen today. When PT entered the room, patient complained of chest pain. Her /88 (126). CHAD Ramirez was present. PT services will continue to follow patient when she is appropriate to participate.     Migdalia Carrera, PT, DPT   7/6/2022

## 2022-07-06 NOTE — PROGRESS NOTES
[facility] - Emergency Dept  [facility] Medicine  Progress Note    Patient Name: Nelda Rawls  MRN: [id number]  Patient Class: OP- Observation   Admission Date: 7/4/2022  Length of Stay: 0 days  Attending Physician: Raheel Nguyen MD  Primary Care Provider: Jayleen Lancaster MD        Subjective:     Principal Problem:Syncope and collapse        HPI:  Miss Nelda Rawls is a 86 y.o. female  with PMH Of HTN, HLD, PVD CAD and critical AS s/p Bioprosthetic SAVR and CABG x 3V on 10/14/21, postop she developed post-op CHB; and underwent implantation ofr Medtronic CRT-P (RA, RV, LBB) on 10/21/21 in [place] admitted for evaluation of syncope         AAOX 3. Yesterday, patient used the restroom and was walking out of the bathroom had an episode of unwitnessed syncope  -  was found on the floor by .. Patient  does not recall episode and denies feeling of presyncope.  reportedly has loss of consiousness for  several minutes. susequently, daughters arrived and got her off the floor. believes she fell on her tailbone as it it sore. reported weakness after  eating breakfast today and  not able to stand up. systolic BP was 95 earlier  this AM.  patient reports issues with hypernatremia and hyponatremia around the time of her surgery last year.   reports normal oral intake. compliant with home medications.  on Losartan/HCTZ  daily since april, but took 5 doses of lasix 20mg prior to this admission for LE edema.   denies chest pain. lives alone  with  on hospice. ADL independent at baseline      ER course - Sodium at 127, received NS bolus of 500ml x 1. pacemaker interrogation requested       Overview/Hospital Course:  7/5 sodium improved to 131. NS hydration 75ml/h x 6h . echo and cardiac device interrogation today . resume losartan 50mg daily. discussed with cardiology . no events noted on pacemaker interrogation  7/6 sodium at 133.continue  NS hydration 75ml/h x 6h .echoleft ventricle is normal in size with concentric  remodeling and normal systolic function.The estimated ejection fraction is 55%.Indeterminate left ventricular diastolic function.Mild mitral regurgitation.Normal right ventricular size with normal right ventricular systolic function.The estimated PA systolic pressure is 27 mmHg. Normal central venous pressure (3 mmHg). orthostatic BP today likely discharge home this PM             Review of Systems:   Pain scale:   Constitutional:  fever,  chills, headache, vision loss, hearing loss, weight loss, Generalized weakness, falls, loss of smell, loss of taste, poor appetite,  sore throat  Respiratory: cough, shortness of breath.   Cardiovascular: chest pain, dizziness, palpitations, orthopnea, swelling of feet, syncope  Gastrointestinal: nausea, vomiting, abdominal pain, diarrhea, black stool,  blood in stool, change in bowel habits  Genitourinary: hematuria, dysuria, urgency, frequency  Integument/Breast: rash,  pruritis  Hematologic/Lymphatic: easy bruising, lymphadenopathy  Musculoskeletal: arthralgias , myalgias, back pain, neck pain, knee pain  Neurological: confusion, seizures, tremors, slurred speech  Behavioral/Psych:  depression, anxiety, auditory or visual hallucinations   OBJECTIVE:     Physical Exam:  Body mass index is 24.37 kg/m².      Constitutional: Appears well-developed and well-nourished.   Head: Normocephalic and atraumatic.   Neck: Normal range of motion. Neck supple.   Cardiovascular: Normal heart rate.  Regular heart rhythm.  Pulmonary/Chest: Effort normal.   Abdominal: No distension.  No tenderness  Musculoskeletal: Normal range of motion. No edema.   Neurological: Alert and oriented to person, place, and time.   Skin: Skin is warm and dry.   Psychiatric: Normal mood and affect. Behavior is normal.                  Vital Signs  Temp: 97 °F (36.1 °C) (07/06/22 0717)  Pulse: 74 (07/06/22 0717)  Resp: 16 (07/06/22 0717)  BP: (Abnormal) 192/74 (07/06/22 0717)  SpO2: (Abnormal) 94 % (07/06/22 0717)      24 Hour VS Range    Temp:  [97 °F (36.1 °C)-99 °F (37.2 °C)]   Pulse:  [74-90]   Resp:  [16-18]   BP: (147-192)/(64-77)   SpO2:  [94 %-100 %]     Intake/Output Summary (Last 24 hours) at 7/6/2022 0740  Last data filed at 7/6/2022 0400  Gross per 24 hour   Intake 270 ml   Output 575 ml   Net -305 ml         I/O This Shift:  No intake/output data recorded.    Wt Readings from Last 3 Encounters:   07/05/22 58.5 kg (129 lb)   04/14/22 58.8 kg (129 lb 10.1 oz)   12/17/21 59 kg (130 lb)       I have personally reviewed the vitals and recorded Intake/Output     Laboratory/Diagnostic Data:    CBC/Anemia Labs: Coags:    Recent Labs   Lab 06/30/22  0834 07/04/22  1237 07/05/22  0321   WBC 5.46 6.14 6.69   HGB 12.2 10.8* 10.3*   HCT 36.5* 32.1* 29.8*    178 185   MCV 91 89 91   RDW 13.9 13.7 13.3    No results for input(s): PT, INR, APTT in the last 168 hours.     Chemistries: ABG:   Recent Labs   Lab 07/04/22  1237 07/05/22  0321 07/05/22  1939 07/06/22  0459   *  127* 131* 132* 133*   K 3.8  3.8 3.6 5.2* 4.0   CL 92*  92* 100 101 103   CO2 24  24 22* 21* 21*   BUN 27*  27* 22 24* 19   CREATININE 1.1  1.1 1.0 1.2 0.9   CALCIUM 9.7  9.7 9.2 9.2 9.0   PROT 7.6 6.8  --  6.6   BILITOT 0.6 0.6  --  0.5   ALKPHOS 88 74  --  79   ALT 13 11  --  12   AST 18 16  --  15   MG  --  1.9  --  1.9   PHOS 3.8 3.1 2.9 2.8    No results for input(s): PH, PCO2, PO2, HCO3, POCSATURATED, BE in the last 168 hours.     POCT Glucose: HbA1c:    No results for input(s): POCTGLUCOSE in the last 168 hours. Hemoglobin A1C   Date Value Ref Range Status   07/04/2022 5.3 4.0 - 5.6 % Final     Comment:     ADA Screening Guidelines:  5.7-6.4%  Consistent with prediabetes  >or=6.5%  Consistent with diabetes    High levels of fetal hemoglobin interfere with the HbA1C  assay. Heterozygous hemoglobin variants (HbS, HgC, etc)do  not significantly interfere with this assay.   However, presence of multiple variants may affect accuracy.           Cardiac Enzymes: Ejection Fractions:    Recent Labs     07/04/22  1237   TROPONINI 0.010    EF   Date Value Ref Range Status   07/05/2022 55 % Final          Recent Labs   Lab 07/04/22  1319   COLORU Straw   APPEARANCEUA Clear   PHUR 6.0   SPECGRAV 1.005   PROTEINUA Negative   GLUCUA Negative   KETONESU Negative   BILIRUBINUA Negative   OCCULTUA Negative   NITRITE Negative   LEUKOCYTESUR Negative       No results found for: PROCAL, LACTATE  BNP (pg/mL)   Date Value   07/04/2022 274 (H)     No results found for: CRP, SEDRATE  No results found for: DDIMER  No results found for: FERRITIN  No results found for: LDH  Troponin I (ng/mL)   Date Value   07/04/2022 0.010     No results found for this or any previous visit.  POC Rapid COVID (no units)   Date Value   07/04/2022 Negative       Microbiology labs for the last week  Microbiology Results (last 7 days)     ** No results found for the last 168 hours. **          Reviewed and noted in plan where applicable- Please see chart for full lab data.    Lines/Drains:       Peripheral IV - Single Lumen 07/04/22 1237 20 G Right Antecubital (Active)   Site Assessment Clean;Dry;Intact;No redness;No swelling 07/04/22 1238   Line Status Blood return noted;Flushed;Saline locked 07/04/22 1238   Dressing Status Clean;Dry;Intact 07/04/22 1238   Dressing Intervention First dressing 07/04/22 1238   Number of days: 0            Peripheral IV - Single Lumen 07/05/22 0655 22 G Left Hand (Active)   Site Assessment Clean;Dry;Intact 07/05/22 0656   Line Status Blood return noted 07/05/22 0656   Dressing Intervention First dressing 07/05/22 0656   Number of days: 0       Imaging  ECG Results          EKG 12-lead (Final result)  Result time 07/04/22 17:04:24    Final result by Interface, Lab In [facility] (07/04/22 17:04:24)             Narrative:    Test Reason : R40.20,    Vent. Rate : 066 BPM     Atrial Rate : 066 BPM     P-R Int : 120 ms          QRS Dur : 144 ms      QT Int : 440 ms        P-R-T Axes : 071 018 -74 degrees     QTc Int : 461 ms    AV dual-paced rhythm also with atrial sensed ventricular pacing  Abnormal ECG  When compared with ECG of 14-APR-2022 11:07,  No significant change was found  Confirmed by SHERRY MOYA MD ([id number]) on 7/4/2022 5:04:12 PM    Referred By: LOAN   SELF           Confirmed By:SHERRY MOYA MD                            No results found for this or any previous visit.      Echo  · The left ventricle is normal in size with concentric remodeling and   normal systolic function.  · The estimated ejection fraction is 55%.  · Indeterminate left ventricular diastolic function.  · Mild mitral regurgitation.  · Normal right ventricular size with normal right ventricular systolic   function.  · The estimated PA systolic pressure is 27 mmHg.  · Normal central venous pressure (3 mmHg).         Labs, Imaging, EKG and Diagnostic results from 7/6/2022 were reviewed.    Medications:  Medication list was reviewed and changes noted under Assessment/Plan.  No current facility-administered medications on file prior to encounter.     Current Outpatient Medications on File Prior to Encounter   Medication Sig Dispense Refill    acetaminophen (TYLENOL) 325 MG tablet Take 325 mg by mouth daily as needed for Pain.      aspirin 325 MG tablet Take 1 tablet (325 mg total) by mouth once daily. 90 tablet 3    atorvastatin (LIPITOR) 40 MG tablet Take 1 tablet (40 mg total) by mouth once daily. 90 tablet 3    calcium carbonate (TUMS) 200 mg calcium (500 mg) chewable tablet Take 2 tablets by mouth 2 (two) times daily as needed for Heartburn.      loratadine (CLARITIN) 10 mg tablet Take 10 mg by mouth once daily.      losartan-hydrochlorothiazide 50-12.5 mg (HYZAAR) 50-12.5 mg per tablet Take 1 tablet by mouth once daily. 90 tablet 3    metoprolol tartrate (LOPRESSOR) 25 MG tablet Take 1 tablet (25 mg total) by mouth Daily. Takes 0.5mg BID (Patient taking differently: Take 25 mg by mouth  Daily.) 90 tablet 3    multivitamin (THERAGRAN) per tablet Take 1 tablet by mouth once daily.      oxymetazoline (AFRIN) 0.05 % nasal spray 1-2 sprays by Nasal route daily as needed for Congestion.      furosemide (LASIX) 40 MG tablet Take 1 tablet (40 mg total) by mouth 2 (two) times daily. (Patient taking differently: Take 40 mg by mouth daily as needed.) 90 tablet 3     Scheduled Medications:  atorvastatin, 40 mg, Oral, Daily  doxycycline, 100 mg, Oral, Q12H  losartan, 50 mg, Oral, Daily      PRN: acetaminophen, dextrose 10%, dextrose 10%, glucagon (human recombinant), glucose, glucose, melatonin, naloxone, sodium chloride 0.9%  Infusions:    sodium chloride 0.9%       Estimated Creatinine Clearance: 37.6 mL/min (based on SCr of 0.9 mg/dL).    Assessment/Plan:      * Syncope and collapse  patient used the restroom on 7/3/22  and was walking out of the bathroom had an episode of unwitnessed syncope -  was found on the floor by .  Patient  does not recall episode and denies feeling of presyncope.  reportedly has loss of consiousness for  several minutes. susequently, daughters arrived and got her off the floor.     ER course - Sodium at 127, received NS bolus of 500ml x 1 .pacemaker interrogation requested       Check 2D echo and  orthostatic vitals. Continue telemetry to rule out arrhythmia. Gentle IV hydration, Fall precautions  7/5   echo and cardiac device interrogation today .discussed with cardiology . no events noted on pacemaker interrogation  7/6 echo- left ventricle is normal in size with concentric remodeling and normal systolic function.The estimated ejection fraction is 55%.Indeterminate left ventricular diastolic function.Mild mitral regurgitation.Normal right ventricular size with normal right ventricular systolic function.The estimated PA systolic pressure is 27 mmHg. Normal central venous pressure (3 mmHg). orthostatic BP today      Left maxillary sinusitis  started on doxycycline  BID      Anemia      Patient's with Normocytic anemia.. Hemoglobin stable. Etiology likely due to chronic disease .  Current CBC reviewed-  Recent Labs   Lab 06/30/22  0834 07/04/22  1237   HGB 12.2 10.8*       Monitor CBC and transfuse if H/H drops below 7/21.       Hyponatremia  sodium at 127 . patient reports issues with hypernatremia and hyponatremia around the time of her surgery last year. s.  on Losartan/HCTZ  daily since april, but took 5 doses of lasix 20mg prior to this admission for LE edema.    hold lasix and Losartan/ HCTZ     obtain urine sodium, serum and urine osmolality  7/5 sodium improved to 131. NS hydration 75ml/h x 6h .   7/6 sodium at 133.continue  NS hydration 75ml/h x 6h .echoleft ventricle is normal in size with concentric remodeling and normal systolic function.The estimated ejection fraction is 55%.Indeterminate left ventricular diastolic function.Mild mitral regurgitation.Normal right ventricular size with normal right ventricular systolic function.The estimated PA systolic pressure is 27 mmHg. Normal central venous pressure (3 mmHg). likely discharge home this PM     History of aortic valve replacement with bioprosthetic valve  continue ASA      Primary hypertension    Chronic, controlled.  Latest blood pressure and vitals reviewed-   Temp:  [97.7 °F (36.5 °C)-98.4 °F (36.9 °C)]   Pulse:  []   Resp:  [18]   BP: (153-173)/(66-82)   SpO2:  [98 %-100 %] .   Home meds for hypertension were reviewed -lasix, hyzar and metoprolol held.   PRN meds if BP> 180/110 mm HG  7/5 SBP in 160s. resume losartan 50mg daily      Pure hypercholesterolemia  not on statin      Coronary artery disease involving native coronary artery of native heart without angina pectoris   CAD s/p  CABG x 3V on 10/14/21. continue ASA. hold  metoprolol until cardiac device interrogation     Stage 3a chronic kidney disease  Creatine stable for now. BMP reviewed- noted Estimated Creatinine Clearance: 30.8 mL/min (based on  SCr of 1.1 mg/dL). according to latest data. Monitor UOP and serial BMP and adjust therapy as needed. Renally dose meds.    Recent Labs   Lab 06/30/22  0834 07/04/22  1237   BUN 20 27*   CREATININE 1.0 1.1          Congestive heart failure  Patient admitted without  signs and symptoms of CHF exacerbation. hold lasix.    Results for orders placed or performed during the hospital encounter of 07/04/22   Brain natriuretic peptide   Result Value Ref Range     (H) 0 - 99 pg/mL    Telemetry monitoring. Strict input/ Output monitor, Daily weights.    denies chestpain.  Obtain serial troponins.  Cardiac Enzymes trend  Recent Labs   Lab 07/04/22  1237   TROPONINI 0.010     No results found for this or any previous visit.  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    critical AS s/p Bioprosthetic SAVR and CABG x 3V on 10/14/21, postop she developed post-op CHB; and underwent implantation ofr Medtronic CRT-P (RA, RV, LBB) on 10/21/21 in [place]     Aortic stenosis, severe   s/p Bioprosthetic SAVR and CABG x 3V on 10/14/21     VTE Risk Mitigation (From admission, onward)         Ordered     IP VTE HIGH RISK PATIENT  Once         07/04/22 2229     Place sequential compression device  Until discontinued         07/04/22 1440                Discharge Planning   GIOVANNI: 7/7/2022     Code Status: Full Code   Is the patient medically ready for discharge?: No    Reason for patient still in hospital (select all that apply): Treatment                     Raheel Nguyen MD  Department of Hospital Medicine   [facility] - Emergency Dept

## 2022-07-06 NOTE — ASSESSMENT & PLAN NOTE
sodium at 127 . patient reports issues with hypernatremia and hyponatremia around the time of her surgery last year. s.  on Losartan/HCTZ  daily since april, but took 5 doses of lasix 20mg prior to this admission for LE edema.    hold lasix and Losartan/ HCTZ     obtain urine sodium, serum and urine osmolality  7/5 sodium improved to 131. NS hydration 75ml/h x 6h .   7/6 sodium at 133.continue  NS hydration 75ml/h x 6h .echoleft ventricle is normal in size with concentric remodeling and normal systolic function.The estimated ejection fraction is 55%.Indeterminate left ventricular diastolic function.Mild mitral regurgitation.Normal right ventricular size with normal right ventricular systolic function.The estimated PA systolic pressure is 27 mmHg. Normal central venous pressure (3 mmHg). likely discharge home this PM

## 2022-07-06 NOTE — DISCHARGE SUMMARY
Deon Bose - Telemetry Stepdown ([place] [id number])  [facility] Medicine  Discharge Summary      Patient Name: Nelda Rawls  MRN: [id number]  Patient Class: OP- Observation  Admission Date: 7/4/2022  Hospital Length of Stay: 0 days  Discharge Date and Time:  07/06/2022 7:44 AM  Attending Physician: Raheel Nguyen MD   Discharging Provider: Raheel Nguyen MD  Primary Care Provider: Jayleen Lancaster MD  [facility] Medicine Team: [facility] MED N Raheel Nguyen MD    HPI:   Miss Nelda Rawls is a 86 y.o. female  with PMH Of HTN, HLD, PVD CAD and critical AS s/p Bioprosthetic SAVR and CABG x 3V on 10/14/21, postop she developed post-op CHB; and underwent implantation ofr Medtronic CRT-P (RA, RV, LBB) on 10/21/21 in [place] admitted for evaluation of syncope         AAOX 3. Yesterday, patient used the restroom and was walking out of the bathroom had an episode of unwitnessed syncope  -  was found on the floor by .. Patient  does not recall episode and denies feeling of presyncope.  reportedly has loss of consiousness for  several minutes. susequently, daughters arrived and got her off the floor. believes she fell on her tailbone as it it sore. reported weakness after  eating breakfast today and  not able to stand up. systolic BP was 95 earlier  this AM.  patient reports issues with hypernatremia and hyponatremia around the time of her surgery last year.   reports normal oral intake. compliant with home medications.  on Losartan/HCTZ  daily since april, but took 5 doses of lasix 20mg prior to this admission for LE edema.   denies chest pain. lives alone  with  on hospice. ADL independent at baseline      ER course - Sodium at 127, received NS bolus of 500ml x 1. pacemaker interrogation requested       * No surgery found *      Hospital Course:   7/5 sodium improved to 131. NS hydration 75ml/h x 6h . echo and cardiac device interrogation today . resume losartan 50mg daily. discussed with cardiology . no events noted  on pacemaker interrogation  7/6 sodium at 133.continue  NS hydration 75ml/h x 6h .echoleft ventricle is normal in size with concentric remodeling and normal systolic function.The estimated ejection fraction is 55%.Indeterminate left ventricular diastolic function.Mild mitral regurgitation.Normal right ventricular size with normal right ventricular systolic function.The estimated PA systolic pressure is 27 mmHg. Normal central venous pressure (3 mmHg). orthostatic BP today likely discharge home this PM          Goals of Care Treatment Preferences:  Code Status: Full Code  Assessment/Plan:      * Syncope and collapse  patient used the restroom on 7/3/22  and was walking out of the bathroom had an episode of unwitnessed syncope -  was found on the floor by .  Patient  does not recall episode and denies feeling of presyncope.  reportedly has loss of consiousness for  several minutes. susequently, daughters arrived and got her off the floor.      ER course - Sodium at 127, received NS bolus of 500ml x 1 .pacemaker interrogation requested        Check 2D echo and  orthostatic vitals. Continue telemetry to rule out arrhythmia. Gentle IV hydration, Fall precautions  7/5   echo and cardiac device interrogation today .discussed with cardiology . no events noted on pacemaker interrogation  7/6 echo- left ventricle is normal in size with concentric remodeling and normal systolic function.The estimated ejection fraction is 55%.Indeterminate left ventricular diastolic function.Mild mitral regurgitation.Normal right ventricular size with normal right ventricular systolic function.The estimated PA systolic pressure is 27 mmHg. Normal central venous pressure (3 mmHg). orthostatic BP today       Left maxillary sinusitis  started on doxycycline BID        Anemia        Patient's with Normocytic anemia.. Hemoglobin stable. Etiology likely due to chronic disease .       Current CBC reviewed-  Recent Labs   Lab 06/30/22  0834  07/04/22  1237   HGB 12.2 10.8*         Monitor CBC and transfuse if H/H drops below 7/21.         Hyponatremia  sodium at 127 . patient reports issues with hypernatremia and hyponatremia around the time of her surgery last year. s.  on Losartan/HCTZ  daily since april, but took 5 doses of lasix 20mg prior to this admission for LE edema.     hold lasix and Losartan/ HCTZ      obtain urine sodium, serum and urine osmolality  7/5 sodium improved to 131. NS hydration 75ml/h x 6h .   7/6 sodium at 133.continue  NS hydration 75ml/h x 6h .echoleft ventricle is normal in size with concentric remodeling and normal systolic function.The estimated ejection fraction is 55%.Indeterminate left ventricular diastolic function.Mild mitral regurgitation.Normal right ventricular size with normal right ventricular systolic function.The estimated PA systolic pressure is 27 mmHg. Normal central venous pressure (3 mmHg). likely discharge home this PM      History of aortic valve replacement with bioprosthetic valve  continue ASA        Primary hypertension     Chronic, controlled.  Latest blood pressure and vitals reviewed-   Temp:  [97.7 °F (36.5 °C)-98.4 °F (36.9 °C)]   Pulse:  []   Resp:  [18]   BP: (153-173)/(66-82)   SpO2:  [98 %-100 %] .   Home meds for hypertension were reviewed -lasix, hyzar and metoprolol held.   PRN meds if BP> 180/110 mm HG  7/5 SBP in 160s. resume losartan 50mg daily        Pure hypercholesterolemia  not on statin        Coronary artery disease involving native coronary artery of native heart without angina pectoris   CAD s/p  CABG x 3V on 10/14/21. continue ASA. hold  metoprolol until cardiac device interrogation      Stage 3a chronic kidney disease  Creatine stable for now. BMP reviewed- noted Estimated Creatinine Clearance: 30.8 mL/min (based on SCr of 1.1 mg/dL). according to latest data. Monitor UOP and serial BMP and adjust therapy as needed. Renally dose meds.          Recent Labs   Lab  06/30/22  0834 07/04/22  1237   BUN 20 27*   CREATININE 1.0 1.1            Congestive heart failure  Patient admitted without  signs and symptoms of CHF exacerbation. hold lasix.          Results for orders placed or performed during the hospital encounter of 07/04/22   Brain natriuretic peptide   Result Value Ref Range      (H) 0 - 99 pg/mL    Telemetry monitoring. Strict input/ Output monitor, Daily weights.    denies chestpain.  Obtain serial troponins.  Cardiac Enzymes trend      Recent Labs   Lab 07/04/22  1237   TROPONINI 0.010      No results found for this or any previous visit.  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    critical AS s/p Bioprosthetic SAVR and CABG x 3V on 10/14/21, postop she developed post-op CHB; and underwent implantation ofr Medtronic CRT-P (RA, RV, LBB) on 10/21/21 in [place]      Aortic stenosis, severe   s/p Bioprosthetic SAVR and CABG x 3V on 10/14/21         Consults:     Final Active Diagnoses:    Diagnosis Date Noted POA    PRINCIPAL PROBLEM:  Syncope and collapse [R55] 07/04/2022 Yes    Hyponatremia [E87.1] 07/04/2022 Yes    Anemia [D64.9] 07/04/2022 Yes    Left maxillary sinusitis [J32.0] 07/04/2022 Yes    History of aortic valve replacement with bioprosthetic valve [Z95.3] 11/08/2021 Not Applicable     Chronic    Primary hypertension [I10] 11/08/2021 Yes     Chronic    Pure hypercholesterolemia [E78.00] 11/08/2021 Yes     Chronic    Cardiac resynchronization therapy 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 [Z95.0] 11/04/2021 Yes     Chronic    Coronary artery disease involving native coronary artery of native heart without angina pectoris [I25.10] 10/18/2021 Yes     Chronic    Stage 3a chronic kidney disease [N18.31] 10/18/2021 Yes    Aortic stenosis, severe [I35.0] 10/09/2021 Yes     Chronic    Congestive heart failure [I50.9] 10/04/2021 Yes      Problems Resolved During this Admission:       Medications:  Reconciled Home  Medications:      Medication List      Start taking these medications    doxycycline 100 MG capsule  Commonly known as: MONODOX  Take 1 capsule (100 mg total) by mouth every 12 (twelve) hours. for 5 days     losartan 50 MG tablet  Commonly known as: COZAAR  Take 1 tablet (50 mg total) by mouth once daily.     metoprolol succinate 25 MG 24 hr tablet  Commonly known as: TOPROL-XL  Take 1 tablet (25 mg total) by mouth once daily.        Change how you take these medications    furosemide 40 MG tablet  Commonly known as: LASIX  Take 1 tablet (40 mg total) by mouth daily as needed (for shortness of breath/  weight gain of > 2lb).  What changed:   · when to take this  · reasons to take this        Continue taking these medications    acetaminophen 325 MG tablet  Commonly known as: TYLENOL  Take 325 mg by mouth daily as needed for Pain.     aspirin 325 MG tablet  Take 1 tablet (325 mg total) by mouth once daily.     atorvastatin 40 MG tablet  Commonly known as: LIPITOR  Take 1 tablet (40 mg total) by mouth once daily.     calcium carbonate 200 mg calcium (500 mg) chewable tablet  Commonly known as: TUMS  Take 2 tablets by mouth 2 (two) times daily as needed for Heartburn.     loratadine 10 mg tablet  Commonly known as: CLARITIN  Take 10 mg by mouth once daily.     multivitamin per tablet  Commonly known as: THERAGRAN  Take 1 tablet by mouth once daily.     oxymetazoline 0.05 % nasal spray  Commonly known as: AFRIN  1-2 sprays by Nasal route daily as needed for Congestion.        Stop taking these medications    losartan-hydrochlorothiazide 50-12.5 mg 50-12.5 mg per tablet  Commonly known as: HYZAAR     metoprolol tartrate 25 MG tablet  Commonly known as: LOPRESSOR              Discharged Condition: fair    Disposition: Home-Health Care [facility] [place]               Follow Up:     Patient Instructions:   No discharge procedures on file.    Laboratory/Diagnostic Data:    CBC/Anemia Labs: Coags:    Recent Labs   Lab 07/04/22  9168  07/05/22 0321 07/06/22  0459   WBC 6.14 6.69 6.44   HGB 10.8* 10.3* 11.1*   HCT 32.1* 29.8* 33.0*    185 194   MCV 89 91 92   RDW 13.7 13.3 13.7    No results for input(s): PT, INR, APTT in the last 168 hours.     Chemistries: ABG:   Recent Labs   Lab 07/04/22  1237 07/05/22  0321 07/05/22  1939 07/06/22  0459   *  127* 131* 132* 133*   K 3.8  3.8 3.6 5.2* 4.0   CL 92*  92* 100 101 103   CO2 24  24 22* 21* 21*   BUN 27*  27* 22 24* 19   CREATININE 1.1  1.1 1.0 1.2 0.9   CALCIUM 9.7  9.7 9.2 9.2 9.0   PROT 7.6 6.8  --  6.6   BILITOT 0.6 0.6  --  0.5   ALKPHOS 88 74  --  79   ALT 13 11  --  12   AST 18 16  --  15   MG  --  1.9  --  1.9   PHOS 3.8 3.1 2.9 2.8    No results for input(s): PH, PCO2, PO2, HCO3, POCSATURATED, BE in the last 168 hours.     POCT Glucose: HbA1c:    No results for input(s): POCTGLUCOSE in the last 168 hours. Hemoglobin A1C   Date Value Ref Range Status   07/04/2022 5.3 4.0 - 5.6 % Final     Comment:     ADA Screening Guidelines:  5.7-6.4%  Consistent with prediabetes  >or=6.5%  Consistent with diabetes    High levels of fetal hemoglobin interfere with the HbA1C  assay. Heterozygous hemoglobin variants (HbS, HgC, etc)do  not significantly interfere with this assay.   However, presence of multiple variants may affect accuracy.          Cardiac Enzymes: Ejection Fractions:    Recent Labs     07/04/22  1237   TROPONINI 0.010    EF   Date Value Ref Range Status   07/05/2022 55 % Final          Recent Labs   Lab 07/04/22  1319   COLORU Straw   APPEARANCEUA Clear   PHUR 6.0   SPECGRAV 1.005   PROTEINUA Negative   GLUCUA Negative   KETONESU Negative   BILIRUBINUA Negative   OCCULTUA Negative   NITRITE Negative   LEUKOCYTESUR Negative       No results found for: PROCAL, LACTATE  BNP (pg/mL)   Date Value   07/04/2022 274 (H)     No results found for: CRP, SEDRATE  No results found for: DDIMER  No results found for: FERRITIN  No results found for: LDH  Troponin I (ng/mL)   Date Value    07/04/2022 0.010     No results found for this or any previous visit.  POC Rapid COVID (no units)   Date Value   07/04/2022 Negative       Microbiology labs for the last week  Microbiology Results (last 7 days)     ** No results found for the last 168 hours. **            Reviewed and noted in plan where applicable- Please see chart for full lab data.    Lines/Drains:       Peripheral IV - Single Lumen 07/04/22 1237 20 G Right Antecubital (Active)   Site Assessment Clean;Dry;Intact;No redness;No swelling 07/04/22 1238   Line Status Blood return noted;Flushed;Saline locked 07/04/22 1238   Dressing Status Clean;Dry;Intact 07/04/22 1238   Dressing Intervention First dressing 07/04/22 1238   Number of days: 1            Peripheral IV - Single Lumen 07/05/22 0655 22 G Left Hand (Active)   Site Assessment Clean;Dry;Intact;No redness;No swelling 07/05/22 2000   Extremity Assessment Distal to IV No redness;No swelling;No warmth 07/05/22 2000   Line Status Infusing 07/05/22 2000   Dressing Status Clean;Dry;Intact 07/05/22 2000   Dressing Intervention Integrity maintained 07/05/22 2000   Dressing Change Due 07/09/22 07/05/22 2000   Site Change Due 07/09/22 07/05/22 2000   Number of days: 1       Imaging  ECG Results          EKG 12-lead (Final result)  Result time 07/04/22 17:04:24    Final result by Interface, Lab In [facility] (07/04/22 17:04:24)             Narrative:    Test Reason : R40.20,    Vent. Rate : 066 BPM     Atrial Rate : 066 BPM     P-R Int : 120 ms          QRS Dur : 144 ms      QT Int : 440 ms       P-R-T Axes : 071 018 -74 degrees     QTc Int : 461 ms    AV dual-paced rhythm also with atrial sensed ventricular pacing  Abnormal ECG  When compared with ECG of 14-APR-2022 11:07,  No significant change was found  Confirmed by SHERRY MOYA MD ([id number]) on 7/4/2022 5:04:12 PM    Referred By: [email or web address]   SELF           Confirmed By:SHERRY MOYA MD                            Results for orders placed during the  hospital encounter of 07/04/22    Echo    Interpretation Summary  · The left ventricle is normal in size with concentric remodeling and normal systolic function.  · The estimated ejection fraction is 55%.  · Indeterminate left ventricular diastolic function.  · Mild mitral regurgitation.  · Normal right ventricular size with normal right ventricular systolic function.  · The estimated PA systolic pressure is 27 mmHg.  · Normal central venous pressure (3 mmHg).      Echo  · The left ventricle is normal in size with concentric remodeling and   normal systolic function.  · The estimated ejection fraction is 55%.  · Indeterminate left ventricular diastolic function.  · Mild mitral regurgitation.  · Normal right ventricular size with normal right ventricular systolic   function.  · The estimated PA systolic pressure is 27 mmHg.  · Normal central venous pressure (3 mmHg).         Imaging:  Imaging Results          X-Ray Sacrum And Coccyx (Final result)  Result time 07/04/22 20:14:22    Final result by Froilan Ingram MD (07/04/22 20:14:22)             Impression:      No acute displaced fracture identified.    Additional findings as above.      Electronically signed by: Froilan Ingram MD  Date:    07/04/2022  Time:    20:14           Narrative:    EXAMINATION:  XR HIPS BILATERAL 2 VIEW INCL AP PELVIS; XR SACRUM AND COCCYX    CLINICAL HISTORY:  fall;    TECHNIQUE:  AP view of the pelvis and frogleg lateral views of both hips were performed.  Three views of the sacrum and coccyx were performed    COMPARISON:  None.    FINDINGS:  Right hip: No fracture or dislocation.  Hip cartilage space is maintained.    Left hip: No fracture or dislocation.  Hip cartilage space is maintained.    Pelvis and sacrum/coccyx: No acute displaced pelvic or sacrococcygeal fracture.  Some deformity of the mid sacrum without visible fracture line, cortical break or displacement suggest possible remote healed fracture.  SI joints appear  intact.    Numerous rounded calcifications are seen in the left buttocks.  Correlate clinically for injection granulomas at this location.    Surgical clips project over the right thigh.  Some vascular calcifications are present.  Degenerative changes suggested in the visualized lower lumbar spine.                             X-Ray Hips Bilateral 2 View Inc AP Pelvis (Final result)  Result time 07/04/22 20:14:22    Final result by Froilan Ingram MD (07/04/22 20:14:22)             Impression:      No acute displaced fracture identified.    Additional findings as above.      Electronically signed by: Froilan Ingram MD  Date:    07/04/2022  Time:    20:14           Narrative:    EXAMINATION:  XR HIPS BILATERAL 2 VIEW INCL AP PELVIS; XR SACRUM AND COCCYX    CLINICAL HISTORY:  fall;    TECHNIQUE:  AP view of the pelvis and frogleg lateral views of both hips were performed.  Three views of the sacrum and coccyx were performed    COMPARISON:  None.    FINDINGS:  Right hip: No fracture or dislocation.  Hip cartilage space is maintained.    Left hip: No fracture or dislocation.  Hip cartilage space is maintained.    Pelvis and sacrum/coccyx: No acute displaced pelvic or sacrococcygeal fracture.  Some deformity of the mid sacrum without visible fracture line, cortical break or displacement suggest possible remote healed fracture.  SI joints appear intact.    Numerous rounded calcifications are seen in the left buttocks.  Correlate clinically for injection granulomas at this location.    Surgical clips project over the right thigh.  Some vascular calcifications are present.  Degenerative changes suggested in the visualized lower lumbar spine.                             X-Ray Chest AP Portable (Final result)  Result time 07/04/22 13:43:13    Final result by Neville Dacosta MD (07/04/22 13:43:13)             Impression:      1. No acute radiographic findings in the chest on this single view.      Electronically signed  by: Neville Dacosta MD  Date:    07/04/2022  Time:    13:43           Narrative:    EXAMINATION:  XR CHEST AP PORTABLE    CLINICAL HISTORY:  Unspecified coma    TECHNIQUE:  Single frontal view of the chest was performed.    COMPARISON:  None.    FINDINGS:  Left chest wall AICD with leads projecting over the cardiac silhouette.  Mediastinal structures are midline.  Cardiac silhouette is normal in size.  There are postoperative changes of CABG.  Lung volumes are normal and symmetric.  No consolidation.  No pneumothorax or large pleural effusion.  No free air beneath the diaphragm.  Sternotomy wires are well aligned.  There are degenerative changes of the spine.  No acute osseous abnormalities.  Visualized soft tissues are within normal limits.                             CT Head Without Contrast (Final result)  Result time 07/04/22 13:37:51    Final result by Slava Tovar MD (07/04/22 13:37:51)             Impression:      No evidence of acute hemorrhage or major vascular distribution infarct.    Mild chronic micro small-vessel ischemic change and cerebral volume loss.    Left maxillary sinusitis.      Electronically signed by: Slava Tovar MD  Date:    07/04/2022  Time:    13:37           Narrative:    EXAMINATION:  CT HEAD WITHOUT CONTRAST    CLINICAL HISTORY:  Head trauma, minor (Age >= 65y);syncope yest;    TECHNIQUE:  Low dose axial CT images obtained throughout the head without the use of intravenous contrast.  Axial, sagittal and coronal reconstructions were performed.    COMPARISON:  None.    FINDINGS:  Intracranial compartment:    Prominence of the ventricles and sulci compatible with  cerebral volume loss. Configuration not suggestive of hydrocephalus.    Mild patchy hypoattenuation in the supratentorial white matter, nonspecific but most likely reflecting chronic small vessel ischemic changes. No recent or remote major vascular distribution infarct. No acute hemorrhage.  No mass effect or midline  shift.    No extra-axial blood or fluid collections.    Scattered atherosclerotic calcification about the skull base.    Skull/extracranial contents (limited evaluation):    No displaced calvarial fracture.    The mastoid air cells are clear.  Left frontal osteoma.  Acute and chronic appearing inflammatory changes of the left maxillary sinus.                                Follow Up Instructions:     Future Appointments   Date Time Provider Department Center   7/14/2022  9:30 AM Patrick Subramanian Jr., MD [facility] CARDIO [place]   7/21/2022  1:45 PM ECHO[facility] ECHOSTR [place]       Discharge Instructions:  No discharge procedures on file.    Raheel Nguyen MD  Attending Staff Physician  [facility] Medicine

## 2022-07-06 NOTE — ASSESSMENT & PLAN NOTE
patient used the restroom on 7/3/22  and was walking out of the bathroom had an episode of unwitnessed syncope -  was found on the floor by .  Patient  does not recall episode and denies feeling of presyncope.  reportedly has loss of consiousness for  several minutes. susequently, daughters arrived and got her off the floor.     ER course - Sodium at 127, received NS bolus of 500ml x 1 .pacemaker interrogation requested       Check 2D echo and  orthostatic vitals. Continue telemetry to rule out arrhythmia. Gentle IV hydration, Fall precautions  7/5   echo and cardiac device interrogation today .discussed with cardiology . no events noted on pacemaker interrogation  7/6 echo- left ventricle is normal in size with concentric remodeling and normal systolic function.The estimated ejection fraction is 55%.Indeterminate left ventricular diastolic function.Mild mitral regurgitation.Normal right ventricular size with normal right ventricular systolic function.The estimated PA systolic pressure is 27 mmHg. Normal central venous pressure (3 mmHg). orthostatic BP today

## 2022-07-14 ENCOUNTER — OFFICE VISIT (OUTPATIENT)
Dept: CARDIOLOGY | Facility: CLINIC | Age: 87
End: 2022-07-14
Payer: MEDICARE

## 2022-07-14 VITALS
HEIGHT: 61 IN | HEART RATE: 73 BPM | DIASTOLIC BLOOD PRESSURE: 67 MMHG | SYSTOLIC BLOOD PRESSURE: 162 MMHG | WEIGHT: 134.25 LBS | BODY MASS INDEX: 25.34 KG/M2

## 2022-07-14 DIAGNOSIS — Z95.3 HISTORY OF AORTIC VALVE REPLACEMENT WITH BIOPROSTHETIC VALVE: Chronic | ICD-10-CM

## 2022-07-14 DIAGNOSIS — I44.2 CHB (COMPLETE HEART BLOCK): Chronic | ICD-10-CM

## 2022-07-14 DIAGNOSIS — E87.1 HYPONATREMIA: Primary | ICD-10-CM

## 2022-07-14 DIAGNOSIS — I50.32 CHRONIC DIASTOLIC CONGESTIVE HEART FAILURE: ICD-10-CM

## 2022-07-14 DIAGNOSIS — I25.10 CORONARY ARTERY DISEASE INVOLVING NATIVE CORONARY ARTERY OF NATIVE HEART WITHOUT ANGINA PECTORIS: Chronic | ICD-10-CM

## 2022-07-14 DIAGNOSIS — Z95.0 CARDIAC RESYNCHRONIZATION THERAPY PACEMAKER (CRT-P) IN PLACE: Chronic | ICD-10-CM

## 2022-07-14 DIAGNOSIS — I10 PRIMARY HYPERTENSION: Chronic | ICD-10-CM

## 2022-07-14 DIAGNOSIS — Z95.1 STATUS POST CORONARY ARTERY BYPASS GRAFTING: Chronic | ICD-10-CM

## 2022-07-14 DIAGNOSIS — I35.0 AORTIC STENOSIS, SEVERE: Chronic | ICD-10-CM

## 2022-07-14 DIAGNOSIS — R55 SYNCOPE AND COLLAPSE: ICD-10-CM

## 2022-07-14 DIAGNOSIS — E78.00 PURE HYPERCHOLESTEROLEMIA: Chronic | ICD-10-CM

## 2022-07-14 PROBLEM — E78.5 HYPERLIPIDEMIA: Status: ACTIVE | Noted: 2022-06-07

## 2022-07-14 PROCEDURE — 99999 PR PBB SHADOW E&M-EST. PATIENT-LVL IV: CPT | Mod: PBBFAC,,, | Performed by: INTERNAL MEDICINE

## 2022-07-14 PROCEDURE — 3077F SYST BP >= 140 MM HG: CPT | Mod: CPTII,S$GLB,, | Performed by: INTERNAL MEDICINE

## 2022-07-14 PROCEDURE — 3288F FALL RISK ASSESSMENT DOCD: CPT | Mod: CPTII,S$GLB,, | Performed by: INTERNAL MEDICINE

## 2022-07-14 PROCEDURE — 3078F DIAST BP <80 MM HG: CPT | Mod: CPTII,S$GLB,, | Performed by: INTERNAL MEDICINE

## 2022-07-14 PROCEDURE — 3288F PR FALLS RISK ASSESSMENT DOCUMENTED: ICD-10-PCS | Mod: CPTII,S$GLB,, | Performed by: INTERNAL MEDICINE

## 2022-07-14 PROCEDURE — 99214 OFFICE O/P EST MOD 30 MIN: CPT | Mod: S$GLB,,, | Performed by: INTERNAL MEDICINE

## 2022-07-14 PROCEDURE — 1159F PR MEDICATION LIST DOCUMENTED IN MEDICAL RECORD: ICD-10-PCS | Mod: CPTII,S$GLB,, | Performed by: INTERNAL MEDICINE

## 2022-07-14 PROCEDURE — 1126F PR PAIN SEVERITY QUANTIFIED, NO PAIN PRESENT: ICD-10-PCS | Mod: CPTII,S$GLB,, | Performed by: INTERNAL MEDICINE

## 2022-07-14 PROCEDURE — 3078F PR MOST RECENT DIASTOLIC BLOOD PRESSURE < 80 MM HG: ICD-10-PCS | Mod: CPTII,S$GLB,, | Performed by: INTERNAL MEDICINE

## 2022-07-14 PROCEDURE — 3077F PR MOST RECENT SYSTOLIC BLOOD PRESSURE >= 140 MM HG: ICD-10-PCS | Mod: CPTII,S$GLB,, | Performed by: INTERNAL MEDICINE

## 2022-07-14 PROCEDURE — 1101F PT FALLS ASSESS-DOCD LE1/YR: CPT | Mod: CPTII,S$GLB,, | Performed by: INTERNAL MEDICINE

## 2022-07-14 PROCEDURE — 1159F MED LIST DOCD IN RCRD: CPT | Mod: CPTII,S$GLB,, | Performed by: INTERNAL MEDICINE

## 2022-07-14 PROCEDURE — 99999 PR PBB SHADOW E&M-EST. PATIENT-LVL IV: ICD-10-PCS | Mod: PBBFAC,,, | Performed by: INTERNAL MEDICINE

## 2022-07-14 PROCEDURE — 1126F AMNT PAIN NOTED NONE PRSNT: CPT | Mod: CPTII,S$GLB,, | Performed by: INTERNAL MEDICINE

## 2022-07-14 PROCEDURE — 99214 PR OFFICE/OUTPT VISIT, EST, LEVL IV, 30-39 MIN: ICD-10-PCS | Mod: S$GLB,,, | Performed by: INTERNAL MEDICINE

## 2022-07-14 PROCEDURE — 1101F PR PT FALLS ASSESS DOC 0-1 FALLS W/OUT INJ PAST YR: ICD-10-PCS | Mod: CPTII,S$GLB,, | Performed by: INTERNAL MEDICINE

## 2022-07-14 RX ORDER — LOSARTAN POTASSIUM 100 MG/1
100 TABLET ORAL DAILY
Qty: 90 TABLET | Refills: 3 | Status: SHIPPED | OUTPATIENT
Start: 2022-07-14 | End: 2022-08-08
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
## 2022-07-14 NOTE — PATIENT INSTRUCTIONS
Recommendations:  1. Take furosemide 40 mg tablets 1 a day as needed for a.m. weight greater than 138lbs  2. Weigh yourself daily and record

## 2022-07-18 ENCOUNTER — TELEPHONE (OUTPATIENT)
Dept: CARDIOLOGY | Facility: CLINIC | Age: 87
End: 2022-07-18
Payer: MEDICARE

## 2022-07-18 DIAGNOSIS — I10 PRIMARY HYPERTENSION: Primary | ICD-10-CM

## 2022-07-18 RX ORDER — AMLODIPINE BESYLATE 2.5 MG/1
2.5 TABLET ORAL NIGHTLY
Qty: 30 TABLET | Refills: 11 | Status: SHIPPED | OUTPATIENT
Start: 2022-07-18 | End: 2022-10-05 | Stop reason: SDUPTHER

## 2022-07-18 NOTE — TELEPHONE ENCOUNTER
Shelli reports patient's blood pressure to be:    Friday 4pm: 136/76  Saturday 5:30p: 205/89  Sunday 7:30a: 180/88  Sunday 5:30p: 206/98  Today 10am: 166/74    She started the new dose of losartan on Friday morning. Please advise.

## 2022-07-18 NOTE — TELEPHONE ENCOUNTER
Blood pressures remain elevated on losartan.  She is off of hydrochlorothiazide due to a recent history of hyponatremia.  She takes Lasix for weight gain.    Will begin amlodipine 2.5 mg tablets every night.  They will let me know if her blood pressure begins to diminish with this.

## 2022-07-18 NOTE — TELEPHONE ENCOUNTER
----- Message from Ophelia Herman sent at 7/18/2022 10:08 AM CDT -----  Name Of Caller: Shelli     Provider Name: Patrick Subramanian Jr    Does patient feel the need to be seen today? No     Relationship to the Pt?: daughter     Contact Preference?: [phone]    What is the nature of the call?:Daughter called and wanted to let you know her BP has been high all weekend. Please call her back

## 2022-07-19 DIAGNOSIS — I50.42 CHRONIC COMBINED SYSTOLIC AND DIASTOLIC HEART FAILURE: ICD-10-CM

## 2022-07-19 DIAGNOSIS — I48.0 PAROXYSMAL ATRIAL FIBRILLATION: Primary | ICD-10-CM

## 2022-07-19 DIAGNOSIS — Z95.0 CARDIAC PACEMAKER IN SITU: ICD-10-CM

## 2022-07-20 ENCOUNTER — TELEPHONE (OUTPATIENT)
Dept: ELECTROPHYSIOLOGY | Facility: CLINIC | Age: 87
End: 2022-07-20
Payer: MEDICARE

## 2022-07-20 DIAGNOSIS — I48.0 PAROXYSMAL ATRIAL FIBRILLATION: Primary | ICD-10-CM

## 2022-07-20 DIAGNOSIS — Z95.0 CARDIAC PACEMAKER IN SITU: ICD-10-CM

## 2022-07-20 DIAGNOSIS — Z95.0 CARDIAC PACEMAKER IN SITU: Primary | ICD-10-CM

## 2022-07-20 DIAGNOSIS — I50.42 CHRONIC COMBINED SYSTOLIC AND DIASTOLIC HEART FAILURE: ICD-10-CM

## 2022-07-20 NOTE — TELEPHONE ENCOUNTER
Pt had appt today for PPM check at [facility]  Pt did not show.  Left vm on mobile ph listed.   Asked pt to call clinic back to let us know if she is running late and/or needs to reschedule.    Called and spoke with her daughter Yoselin. They were unaware of appt.  Rescheduled appt 8/3/22

## 2022-07-21 ENCOUNTER — DOCUMENTATION ONLY (OUTPATIENT)
Dept: CARDIAC REHAB | Facility: CLINIC | Age: 87
End: 2022-07-21
Payer: MEDICARE

## 2022-07-21 NOTE — PROGRESS NOTES
Miss Lutz has completed 3 out of 36 exercise session of Phase II cardiac rehab.  A follow up reassessment will be completed at 12 sessions. Currently, Miss Lutz is on hold due to a family emergency.    Session: Orientation   Cardiac Rehab Individual Treatment Plan - Initial Assessment      Patient Name: Nelda Rawls MRN: [id number]   : 1935   Age: 86 y.o.   Primary Diagnosis: CABG  Date of Event: 10-14-21  EF: ?%  Risk Stratification: high  Referring Physician: YE   Exercise Assessment:     CPX/TM Date: 21 Results   RHR 88   Max    Peak VO2 (CPX only) 9.5   Actual METS (CPX only) 2.7   Estimated METS 2.0     Anthropometrics    Height 61 inches   Weight 130 lbs   BMI 24.6   Abdominal Girth 36   Body Composition 27.3%     ST Depression noted on Stress Test?:No  Angina with exercise?: No   Fall Risk: No   Assistive Devices:  walker   Currently exercising? No   There were no limitations noted by the patient.      Exercise Plan:   Goals:  CR Exercise Goals: Attend Cardiac Rehab 3 times/week: In Progress  Home Aerobic Exercise: 2 additional days/week for 30-60 minutes: In Progress  Intensity of 12-15 on the Rate of Perceived Exertion (RPE) scale: In Progress  30% increase in entry estimated METS: 2.6 : In Progress  5 days/week for 30-60 minutes: In Progress    Intervention:   Discussed importance of regular attendance to cardiac rehab class    Exercise Prescription:  THR Range    Mode: Treadmill  Recumbent Bike  Nustep  Elliptical   Frequency:  3 days/week   Duration:  30 - 60 minutes   Intensity:  12 - 15 RPE   Resistance Training:  Yes: 0 to 5 lb weights with 10-15 reps based on strength and range of motion assessment     Home Prescription:  Mode Aerobic   Frequency: 2- 3 days/week   Duration: 30-60 minutes   Resistance Training: None        Education:  Orientation to Equipment; verbalizes understanding; Date: 21  Exercise Recommendations; verbalizes understanding; Date:  21  Exercise Safety; verbalizes understanding; Date: 21  Class Preparation: verbalizes understanding; Date: 21  Signs and symptoms to report: verbalizes understanding; Date: 21  Caffeine/Hydration: verbalizes understanding; Date: 21  Exercise Terminology: verbalizes understanding; Date: 21  Resistance Training: verbalizes understanding; Date: 21    Comments:  Miss Lutz was encouraged to begin thinking about some type of aerobic exercise she can participate in at least 2 non-rehab days per week for at least 30 minutes in addition to attending Phase II cardiac rehab classes 3 days per week.  I suggested she walk around her home 10 minutes 3 times per day but to also work toward that goal slowly.  She stated understanding.    All consent forms were signed, proper attire and shoes were discussed.       Miss Lutz will begin Cardiac Rehab on  at 10:00am.    The exercise prescription will be adjusted based on tolerance of exercise intensity by patient.    Sohan Singh, CEP    Orientation Cardiac Rehab Individual Treatment Plan - Initial Assessment      Patient Name: Nelda Rawls MRN: [id number]   : 1935   Age: 86 y.o.   Primary Diagnosis: s/p CABG, CAD, HTN, HLD, PVD, CHF, complete heart block, + pacemaker, h/o AVR, CKD III    Nutrition Assessment:     Anthropometrics    Height 61 inches   Weight 130 lbs   BMI 24.6   Abdominal Girth 36   Body Composition 27.3       Drug Allergies and Intolerances:  Review of patient's allergies indicates:   Allergen Reactions    Penicillins Hives and Swelling       Food Allergies and Intolerances:  NA    Past Medical History:  No past medical history on file.    Past Surgical History:  Past Surgical History:   Procedure Laterality Date    CORONARY ARTERY BYPASS GRAFT         Medications:  Current Outpatient Medications   Medication Sig    acetaminophen (TYLENOL) 325 MG tablet Take 325 mg by mouth daily as needed for  Pain.    amLODIPine (NORVASC) 2.5 MG tablet Take 1 tablet (2.5 mg total) by mouth nightly.    aspirin 325 MG tablet Take 1 tablet (325 mg total) by mouth once daily.    atorvastatin (LIPITOR) 40 MG tablet Take 1 tablet (40 mg total) by mouth once daily.    calcium carbonate (TUMS) 200 mg calcium (500 mg) chewable tablet Take 2 tablets by mouth 2 (two) times daily as needed for Heartburn.    furosemide (LASIX) 40 MG tablet Take 1 tablet (40 mg total) by mouth daily as needed (for shortness of breath/  weight gain of > 2lb). (Patient not taking: Reported on 7/14/2022)    loratadine (CLARITIN) 10 mg tablet Take 10 mg by mouth once daily.    losartan (COZAAR) 100 MG tablet Take 1 tablet (100 mg total) by mouth once daily.    metoprolol succinate (TOPROL-XL) 25 MG 24 hr tablet Take 1 tablet (25 mg total) by mouth once daily.    multivitamin (THERAGRAN) per tablet Take 1 tablet by mouth once daily.    oxymetazoline (AFRIN) 0.05 % nasal spray 1-2 sprays by Nasal route daily as needed for Congestion.     No current facility-administered medications for this visit.       Vitamins and Supplements:  MVI, micro-K 10meq BID (lasix 40mg BID)    Labs:  Patient confirms she is taking lipitor 40mg for cholesterol control.    Lab Results   Component Value Date    CHOL 151 06/30/2022     Lab Results   Component Value Date    HDL 65 06/30/2022     Lab Results   Component Value Date    LDLCALC 67.6 06/30/2022     Lab Results   Component Value Date    TRIG 92 06/30/2022     Lab Results   Component Value Date    CHOLHDL 43.0 06/30/2022         Lab Results   Component Value Date    GLUF 96 12/13/2021     Lab Results   Component Value Date    HGBA1C 5.3 07/04/2022       Nutrition/Diet History:  Patient eats 3 meals daily.    Seasons food with Mrs. Nagy.  Patient denies use of a salt shaker at the table on prepared foods.   Dines out 1 per week at restaurants such as [employer], [employer].    Chooses fried foods  rarely  Chooses fish rarely   Beverages:  water, coffee  Alcohol: none    24 Hour Recall:  · Breakfast: oatmeal, banana, black coffee x 2  · Lunch:turkey with american cheese, miracle whip/mustard on honey wheat bread, veggie chips  · Dinner:roast with boiled potatoes, emma slaw, peach cobbler  · Other: 3 sandwich cookies    Difficulty Chewing or Swallowing: no  Current Exercise: See Exercise Physiologist Note  Food Safety/Food Preparation: pt has daughters/helper that prepares food and freezes  Living Arrangements/Family Support: Lives with spouse  Cultural/Spiritual/Personal Preferences: not applicable   Barriers to Education: none identified  Stage of Change Related to Diet Habits: Contemplation    Nutrition Diagnosis:  1. Food and nutrition related knowledge deficit related to the lack of prior nutrition education as evidenced by diet history and 24 hour recall    Nutrition Plan:   Goals:  LDL-C < 70 (for high risk patients)  Hgb A1c < 7%  BMI < 25 and abdominal girth < 40M/<35 F  2 gram sodium, Mediterranean diet  Rehab weight goal: Maintenance  Fish intake (non-fried varieties) to a goal of 2-3 servings per week.   Increase fruit and vegetable intake    Interventions/Recommendations:  Lab results reviewed and discussed  Nutrition Prescription:  · Total Energy Estimated Needs: 5196-7898 Kcal/d for weight maintenance  · Method for Estimating Needs: 25-30kcal/kg  · Total Protein Estimated Needs: 35-59g/d  · Method for Estimating Needs: 0.6-1 g/Kg BW (reduced needs r/t dx CKD III)  · Total Fluid Estimated Needs: 1 mL/Kcal  Dietitian Consult: No  Patient to participate in Cardiac Rehab sessions three times a week  Weekly Dietitian Weight Check  Encouraged patient to complete 3 day food diary  Follow Up Plan for Ongoing Self-Management Support    Education:  Mediterranean Diet; verbalizes understanding; Date: 12/23/21   Person taught: patient and daughter   Preferred Learning Method: Verbal and  written   Education Needed/Provided: Nutrition counseling and education related to cardiac rehabilitation   Education Method: Weekly nutrition lectures on the Mediterranean diet, cooking, shopping, and dining out   Written Materials Provided: 3 Day Food Record, Introduction to Mediterranean Diet   Strategies Implemented: Motivational interviewing, Goal setting, Self-Monitoring, and Problem Solving    Comments:   Discussed ways to incorporate healthy snacks, eating on a schedule, and monitoring sodium intake for heart health.  Encouraged increased produce intake and increased hydration    Diabetes  Is the patient diabetic? No      RD contact information provided.      Brandi Quintero MS, RDN/LDN    Session: Orientation Cardiac Rehab Individual Treatment Plan - Initial Assessment      Patient Name: Nelda Rawls MRN: [id number]   : 1935   Age: 86 y.o.   Date of Event: 10/14/2021   Primary Diagnosis: CABG    EF: unknown    Physical Assessment:   There were no vitals taken for this visit.    ASSESSMENT:  Heart Sounds: regular rate and rhythm, S1, S2 normal, no murmur, click, rub or gallop  Prosthetic Valve: Yes  Lung Sounds: normal air entry, lungs clear to auscultation  Capillary Refill: normal  Left Radial Pulse: Normal (+2)  Right Radial Pulse: Normal (+2)  Left Pedal Pulse: Normal (+2)  Right Pedal Pulse: Normal (+2)  Right Edema: none  Left Edema none  Strength: normal  Range of Motion: full range of motion  Existing Limitations:      Site   [] Arthritis, bursitis    [] Amputation, atrophy    [] Other:    []       Diabetic patient's foot examination comments: None -  not diabetic  Incisional site: CABG site healing well, no drainage or redness noted  Special needs: pt currently ambulating with a walker    Psychosocial Assessment:   Outcome Survey Tools:    CATE SCORES:   PRE   Anxiety 1   Depression 0   Somatic 1   Hostility 0     SF-36 SCORES:   PRE   Physical Function 27   Social Function 7   Mental  Health 29   Pain 10   Change in Health 2   Physical Role Limitation 1   Mental Role Limitation 3   Energy/Fatigue 12   Health Perceptions 22   Total Score 113     PHQ-9:  PHQ-9 Depression Patient Health Questionnaire 7/14/2022   Over the last two weeks how often have you been bothered by little interest or pleasure in doing things 0   Over the last two weeks how often have you been bothered by feeling down, depressed or hopeless 0   Over the last two weeks how often have you been bothered by trouble falling or staying asleep, or sleeping too much -   Over the last two weeks how often have you been bothered by feeling tired or having little energy -   Over the last two weeks how often have you been bothered by a poor appetite or overeating -   Over the last two weeks how often have you been bothered by feeling bad about yourself - or that you are a failure or have let yourself or your family down -   Over the last two weeks how often have you been bothered by trouble concentrating on things, such as reading the newspaper or watching television -   Over the last two weeks how often have you been bothered by moving or speaking so slowly that other people could have noticed. -   Over the last two weeks how often have you been bothered by thoughts that you would be better off dead, or of hurting yourself -   If you checked off any problems, how difficult have these problems made it for you to do your work, take care of things at home or get along with other people? -   Total Score -              Living Arrangements: Lives with spouse  Family Support: children and paid caregiver  Self Reported: Effective Coping Skills and Stress  Displays: happiness and calmness  Medication: pt does not take medications at this time    Psychosocial Plan:   Goals:  Verbalizes coping mechanisms  Reduce manifestation of stress  Maintain positive support system  Maintain positive outlook  Improve overall quality of  life    Interventions/Recommendations:  Discussed Results of Surveys  Patient to Self Report Emotional Changes at Session Check In  Recommend Physical Activity  Recommend Attending Education Lectures  Notify MD: No  Program Referral: No  Pharmaceutical Intervention/Therapy: No  Other Needs: not applicable  Stage of Readiness to Change: Contemplation    Education:  Stress management, verbalizes understanding; Date:12/23/2021  Stress, verbalizes understanding; Date:12/23/2021    Comments:  Pt caring for her , verbalized increased stress with hurricaine. Pt instructed on resources for managing stress verbalized understanding.   Patient has been instructed to notify staff in the event that circumstances worsen.  Patient verbalizes understanding.    Other Core Components/Risk Factors Assessment:   RISK FACTORS:  hyperlipidemia, hypertension, positive family history, sedentary lifestyle, stress    Learning Barriers: None    Education Level:  Unknown    Pre-test Score: 50    Medication Compliance: has been compliant with taking medications    Other Core Components/Risk Factors Plan:   Goals:  Increase exercise tolerance: In Progress  Increase knowledge of CAD: In Progress  Decrease blood pressure: In Progress  Identify and manage personal areas of stress: In Progress  Learn more about healthy eating: In Progress    Interventions/Recommendations:  Recommend regular attendance for Cardiac Rehab: Exercise and Education Lectures  Encourage medication compliance  Individual Education/ Counseling: No  Physician Referral: No    Education:    blood pressure meds, verbalizes understanding; Date: 12/23/2021  cholesterol, verbalizes understanding; Date: 12/23/2021  cholesterol meds, verbalizes understanding; Date: 12/23/2021  hypertension, verbalizes understanding; Date: 12/23/2021  physical activity, verbalizes understanding; Date: 12/23/2021  risk factors, verbalizes understanding; Date: 12/23/2021  stress, verbalizes  understanding; Date: 12/23/2021        Education method adapted to patients education level and preferred method of learning.  Method: explanation    Comments:  Pt instructed on risk factors, medication compliance, importance of exercise, benefits of cardiac rehab, reduction of stress and effects on the body. Pt verbalized understanding.    Other Core Components/Hypertension Assessment:   Resting BP:   BP Readings from Last 1 Encounters:   07/14/22 (!) 162/67         BP Diagnosis: Hypertensive  Patient reported symptoms: none    Other Core Components/Hypertension Plan:   Goals:  Blood Pressure <130/80    Interventions/Recommendations:  Encourage medication compliance  Recommend physical activity  Educate on contributory factors  Reduce stress, anxiety, anger, depression, and/or chronic pain    Education:    Hypertension; verbalizes understanding; Date: 12/23/2021  Coronary Artery Disease; verbalizes understanding; Date: 12/23/2021  Risk Factors; verbalizes understanding; Date: 12/23/2021  Stress; verbalizes understanding; Date: 12/23/2021        Comments:  Pt reports compliance with medications. Pt instructed on taking medications prior to cardiac rehab, monitoring blood pressure, exercise to reduce stress levels; pt verbalized understanding.       Does the patient have Heart Failure? No    Other Core Components/Tobacco Cessation Assessment:   Smoking Status: lifetime non-smoker  Smoking Cessation Barriers: NA  Stage of Readiness to Change: Maintenance    Other Core Components/Tobacco Cessation Plan:   Goals:  Maintain non-smoking status    Interventions:  Maintains non-smoking status    Education:    Benefits of Cardiac Rehab; verbalizes understanding; Date: 12/23/2021        Comments:  Pt nonsmoker lifelong.    Discussed Cardiac Rehab program in depth with patient.  Medication list updated per patient & marked as reviewed.  Patient has been instructed to notify staff of any problems while attending rehab (ie:  chest pain, shortness of breath, lightheadedness, dizziness).  Patient has been instructed to monitor blood pressure readings outside of rehab & to keep a daily log of the readings.  Patient verbalizes understanding.    Roberth Mota RN  Cardiac Rehab Nurse

## 2022-08-03 ENCOUNTER — CLINICAL SUPPORT (OUTPATIENT)
Dept: CARDIOLOGY | Facility: HOSPITAL | Age: 87
End: 2022-08-03
Attending: INTERNAL MEDICINE
Payer: MEDICARE

## 2022-08-03 ENCOUNTER — DOCUMENTATION ONLY (OUTPATIENT)
Dept: CARDIOLOGY | Facility: HOSPITAL | Age: 87
End: 2022-08-03
Payer: MEDICARE

## 2022-08-03 ENCOUNTER — LAB VISIT (OUTPATIENT)
Dept: LAB | Facility: HOSPITAL | Age: 87
End: 2022-08-03
Attending: INTERNAL MEDICINE
Payer: MEDICARE

## 2022-08-03 DIAGNOSIS — Z95.0 CARDIAC PACEMAKER IN SITU: ICD-10-CM

## 2022-08-03 DIAGNOSIS — I48.0 PAROXYSMAL ATRIAL FIBRILLATION: ICD-10-CM

## 2022-08-03 DIAGNOSIS — I50.42 CHRONIC COMBINED SYSTOLIC AND DIASTOLIC HEART FAILURE: ICD-10-CM

## 2022-08-03 DIAGNOSIS — I50.32 CHRONIC DIASTOLIC CONGESTIVE HEART FAILURE: ICD-10-CM

## 2022-08-03 LAB
ANION GAP SERPL CALC-SCNC: 9 MMOL/L (ref 8–16)
BUN SERPL-MCNC: 18 MG/DL (ref 8–23)
CALCIUM SERPL-MCNC: 9.9 MG/DL (ref 8.7–10.5)
CHLORIDE SERPL-SCNC: 98 MMOL/L (ref 95–110)
CO2 SERPL-SCNC: 26 MMOL/L (ref 23–29)
CREAT SERPL-MCNC: 0.9 MG/DL (ref 0.5–1.4)
EST. GFR  (NO RACE VARIABLE): >60 ML/MIN/1.73 M^2
GLUCOSE SERPL-MCNC: 144 MG/DL (ref 70–110)
POTASSIUM SERPL-SCNC: 4.9 MMOL/L (ref 3.5–5.1)
SODIUM SERPL-SCNC: 133 MMOL/L (ref 136–145)

## 2022-08-03 PROCEDURE — 80048 BASIC METABOLIC PNL TOTAL CA: CPT | Performed by: INTERNAL MEDICINE

## 2022-08-03 PROCEDURE — 83880 ASSAY OF NATRIURETIC PEPTIDE: CPT | Performed by: INTERNAL MEDICINE

## 2022-08-03 PROCEDURE — 36415 COLL VENOUS BLD VENIPUNCTURE: CPT | Mod: PO | Performed by: INTERNAL MEDICINE

## 2022-08-03 NOTE — PROGRESS NOTES
LVM for Magda at [facility] in [place] to release patient in [facility].

## 2022-08-05 LAB — NT-PROBNP SERPL IA-MCNC: 1106 PG/ML

## 2022-08-08 ENCOUNTER — PATIENT MESSAGE (OUTPATIENT)
Dept: CARDIOLOGY | Facility: CLINIC | Age: 87
End: 2022-08-08
Payer: MEDICARE

## 2022-08-08 DIAGNOSIS — I10 PRIMARY HYPERTENSION: Primary | ICD-10-CM

## 2022-08-08 RX ORDER — VALSARTAN 320 MG/1
320 TABLET ORAL DAILY
Qty: 90 TABLET | Refills: 3 | Status: SHIPPED | OUTPATIENT
Start: 2022-08-08 | End: 2023-07-26 | Stop reason: SDUPTHER

## 2022-08-08 NOTE — TELEPHONE ENCOUNTER
Patient currently on amlodipine and losartan, also low-dose beta-blocker.  She takes Lasix as needed for swelling.  She had developed hyponatremia previously on therapy with hydrochlorothiazide.  Her potassium tends to be at the upper limit of normal making spironolactone a poor choice.    Recommendations:  Take amlodipine at bedtime  Change losartan to valsartan

## 2022-08-11 ENCOUNTER — DOCUMENTATION ONLY (OUTPATIENT)
Dept: CARDIAC REHAB | Facility: CLINIC | Age: 87
End: 2022-08-11
Payer: MEDICARE

## 2022-08-11 NOTE — PROGRESS NOTES
Miss Lutz has completed 3 out of 36 exercise session of Phase II cardiac rehab.  A follow up reassessment will be completed at 12 sessions. Currently, Miss Lutz is on hold due to a family emergency.    Session: Orientation   Cardiac Rehab Individual Treatment Plan - Initial Assessment      Patient Name: Nelda Rawls MRN: [id number]   : 1935   Age: 86 y.o.   Primary Diagnosis: CABG  Date of Event: 10-14-21  EF: ?%  Risk Stratification: high  Referring Physician: YE   Exercise Assessment:     CPX/TM Date: 21 Results   RHR 88   Max    Peak VO2 (CPX only) 9.5   Actual METS (CPX only) 2.7   Estimated METS 2.0     Anthropometrics    Height 61 inches   Weight 130 lbs   BMI 24.6   Abdominal Girth 36   Body Composition 27.3%     ST Depression noted on Stress Test?:No  Angina with exercise?: No   Fall Risk: No   Assistive Devices:  walker   Currently exercising? No   There were no limitations noted by the patient.      Exercise Plan:   Goals:  CR Exercise Goals: Attend Cardiac Rehab 3 times/week: In Progress  Home Aerobic Exercise: 2 additional days/week for 30-60 minutes: In Progress  Intensity of 12-15 on the Rate of Perceived Exertion (RPE) scale: In Progress  30% increase in entry estimated METS: 2.6 : In Progress  5 days/week for 30-60 minutes: In Progress    Intervention:   Discussed importance of regular attendance to cardiac rehab class    Exercise Prescription:  THR Range    Mode: Treadmill  Recumbent Bike  Nustep  Elliptical   Frequency:  3 days/week   Duration:  30 - 60 minutes   Intensity:  12 - 15 RPE   Resistance Training:  Yes: 0 to 5 lb weights with 10-15 reps based on strength and range of motion assessment     Home Prescription:  Mode Aerobic   Frequency: 2- 3 days/week   Duration: 30-60 minutes   Resistance Training: None        Education:  Orientation to Equipment; verbalizes understanding; Date: 21  Exercise Recommendations; verbalizes understanding; Date:  21  Exercise Safety; verbalizes understanding; Date: 21  Class Preparation: verbalizes understanding; Date: 21  Signs and symptoms to report: verbalizes understanding; Date: 21  Caffeine/Hydration: verbalizes understanding; Date: 21  Exercise Terminology: verbalizes understanding; Date: 21  Resistance Training: verbalizes understanding; Date: 21    Comments:  Miss Lutz was encouraged to begin thinking about some type of aerobic exercise she can participate in at least 2 non-rehab days per week for at least 30 minutes in addition to attending Phase II cardiac rehab classes 3 days per week.  I suggested she walk around her home 10 minutes 3 times per day but to also work toward that goal slowly.  She stated understanding.    All consent forms were signed, proper attire and shoes were discussed.       Miss Lutz will begin Cardiac Rehab on  at 10:00am.    The exercise prescription will be adjusted based on tolerance of exercise intensity by patient.    Sohan Singh, CEP    Orientation Cardiac Rehab Individual Treatment Plan - Initial Assessment      Patient Name: Nelda Rawls MRN: [id number]   : 1935   Age: 86 y.o.   Primary Diagnosis: s/p CABG, CAD, HTN, HLD, PVD, CHF, complete heart block, + pacemaker, h/o AVR, CKD III    Nutrition Assessment:     Anthropometrics    Height 61 inches   Weight 130 lbs   BMI 24.6   Abdominal Girth 36   Body Composition 27.3       Drug Allergies and Intolerances:  Review of patient's allergies indicates:   Allergen Reactions    Penicillins Hives and Swelling       Food Allergies and Intolerances:  NA    Past Medical History:  No past medical history on file.    Past Surgical History:  Past Surgical History:   Procedure Laterality Date    CORONARY ARTERY BYPASS GRAFT         Medications:  Current Outpatient Medications   Medication Sig    acetaminophen (TYLENOL) 325 MG tablet Take 325 mg by mouth daily as needed for  Pain.    amLODIPine (NORVASC) 2.5 MG tablet Take 1 tablet (2.5 mg total) by mouth nightly.    aspirin 325 MG tablet Take 1 tablet (325 mg total) by mouth once daily.    atorvastatin (LIPITOR) 40 MG tablet Take 1 tablet (40 mg total) by mouth once daily.    calcium carbonate (TUMS) 200 mg calcium (500 mg) chewable tablet Take 2 tablets by mouth 2 (two) times daily as needed for Heartburn.    furosemide (LASIX) 40 MG tablet Take 1 tablet (40 mg total) by mouth daily as needed (for shortness of breath/  weight gain of > 2lb). (Patient not taking: Reported on 7/14/2022)    loratadine (CLARITIN) 10 mg tablet Take 10 mg by mouth once daily.    metoprolol succinate (TOPROL-XL) 25 MG 24 hr tablet Take 1 tablet (25 mg total) by mouth once daily.    multivitamin (THERAGRAN) per tablet Take 1 tablet by mouth once daily.    oxymetazoline (AFRIN) 0.05 % nasal spray 1-2 sprays by Nasal route daily as needed for Congestion.    valsartan (DIOVAN) 320 MG tablet Take 1 tablet (320 mg total) by mouth once daily.     No current facility-administered medications for this visit.       Vitamins and Supplements:  MVI, micro-K 10meq BID (lasix 40mg BID)    Labs:  Patient confirms she is taking lipitor 40mg for cholesterol control.    Lab Results   Component Value Date    CHOL 151 06/30/2022     Lab Results   Component Value Date    HDL 65 06/30/2022     Lab Results   Component Value Date    LDLCALC 67.6 06/30/2022     Lab Results   Component Value Date    TRIG 92 06/30/2022     Lab Results   Component Value Date    CHOLHDL 43.0 06/30/2022         Lab Results   Component Value Date    GLUF 96 12/13/2021     Lab Results   Component Value Date    HGBA1C 5.3 07/04/2022       Nutrition/Diet History:  Patient eats 3 meals daily.    Seasons food with Mrs. Nagy.  Patient denies use of a salt shaker at the table on prepared foods.   Dines out 1 per week at restaurants such as [employer], [employer].    Chooses fried  foods rarely  Chooses fish rarely   Beverages:  water, coffee  Alcohol: none    24 Hour Recall:  · Breakfast: oatmeal, banana, black coffee x 2  · Lunch:turkey with american cheese, miracle whip/mustard on honey wheat bread, veggie chips  · Dinner:roast with boiled potatoes, emma slaw, peach cobbler  · Other: 3 sandwich cookies    Difficulty Chewing or Swallowing: no  Current Exercise: See Exercise Physiologist Note  Food Safety/Food Preparation: pt has daughters/helper that prepares food and freezes  Living Arrangements/Family Support: Lives with spouse  Cultural/Spiritual/Personal Preferences: not applicable   Barriers to Education: none identified  Stage of Change Related to Diet Habits: Contemplation    Nutrition Diagnosis:  1. Food and nutrition related knowledge deficit related to the lack of prior nutrition education as evidenced by diet history and 24 hour recall    Nutrition Plan:   Goals:  LDL-C < 70 (for high risk patients)  Hgb A1c < 7%  BMI < 25 and abdominal girth < 40M/<35 F  2 gram sodium, Mediterranean diet  Rehab weight goal: Maintenance  Fish intake (non-fried varieties) to a goal of 2-3 servings per week.   Increase fruit and vegetable intake    Interventions/Recommendations:  Lab results reviewed and discussed  Nutrition Prescription:  · Total Energy Estimated Needs: 0436-3813 Kcal/d for weight maintenance  · Method for Estimating Needs: 25-30kcal/kg  · Total Protein Estimated Needs: 35-59g/d  · Method for Estimating Needs: 0.6-1 g/Kg BW (reduced needs r/t dx CKD III)  · Total Fluid Estimated Needs: 1 mL/Kcal  Dietitian Consult: No  Patient to participate in Cardiac Rehab sessions three times a week  Weekly Dietitian Weight Check  Encouraged patient to complete 3 day food diary  Follow Up Plan for Ongoing Self-Management Support    Education:  Mediterranean Diet; verbalizes understanding; Date: 12/23/21   Person taught: patient and daughter   Preferred Learning Method: Verbal and  written   Education Needed/Provided: Nutrition counseling and education related to cardiac rehabilitation   Education Method: Weekly nutrition lectures on the Mediterranean diet, cooking, shopping, and dining out   Written Materials Provided: 3 Day Food Record, Introduction to Mediterranean Diet   Strategies Implemented: Motivational interviewing, Goal setting, Self-Monitoring, and Problem Solving    Comments:   Discussed ways to incorporate healthy snacks, eating on a schedule, and monitoring sodium intake for heart health.  Encouraged increased produce intake and increased hydration    Diabetes  Is the patient diabetic? No      RD contact information provided.      Brandi Quintero MS, RDN/LDN    Session: Orientation Cardiac Rehab Individual Treatment Plan - Initial Assessment      Patient Name: Nelda Rawls MRN: [id number]   : 1935   Age: 86 y.o.   Date of Event: 10/14/2021   Primary Diagnosis: CABG    EF: unknown    Physical Assessment:   There were no vitals taken for this visit.    ASSESSMENT:  Heart Sounds: regular rate and rhythm, S1, S2 normal, no murmur, click, rub or gallop  Prosthetic Valve: Yes  Lung Sounds: normal air entry, lungs clear to auscultation  Capillary Refill: normal  Left Radial Pulse: Normal (+2)  Right Radial Pulse: Normal (+2)  Left Pedal Pulse: Normal (+2)  Right Pedal Pulse: Normal (+2)  Right Edema: none  Left Edema none  Strength: normal  Range of Motion: full range of motion  Existing Limitations:      Site   [] Arthritis, bursitis    [] Amputation, atrophy    [] Other:    []       Diabetic patient's foot examination comments: None -  not diabetic  Incisional site: CABG site healing well, no drainage or redness noted  Special needs: pt currently ambulating with a walker    Psychosocial Assessment:   Outcome Survey Tools:    CATE SCORES:   PRE   Anxiety 1   Depression 0   Somatic 1   Hostility 0     SF-36 SCORES:   PRE   Physical Function 27   Social Function 7   Mental  Health 29   Pain 10   Change in Health 2   Physical Role Limitation 1   Mental Role Limitation 3   Energy/Fatigue 12   Health Perceptions 22   Total Score 113     PHQ-9:  PHQ-9 Depression Patient Health Questionnaire 7/14/2022   Over the last two weeks how often have you been bothered by little interest or pleasure in doing things 0   Over the last two weeks how often have you been bothered by feeling down, depressed or hopeless 0   Over the last two weeks how often have you been bothered by trouble falling or staying asleep, or sleeping too much -   Over the last two weeks how often have you been bothered by feeling tired or having little energy -   Over the last two weeks how often have you been bothered by a poor appetite or overeating -   Over the last two weeks how often have you been bothered by feeling bad about yourself - or that you are a failure or have let yourself or your family down -   Over the last two weeks how often have you been bothered by trouble concentrating on things, such as reading the newspaper or watching television -   Over the last two weeks how often have you been bothered by moving or speaking so slowly that other people could have noticed. -   Over the last two weeks how often have you been bothered by thoughts that you would be better off dead, or of hurting yourself -   If you checked off any problems, how difficult have these problems made it for you to do your work, take care of things at home or get along with other people? -   Total Score -              Living Arrangements: Lives with spouse  Family Support: children and paid caregiver  Self Reported: Effective Coping Skills and Stress  Displays: happiness and calmness  Medication: pt does not take medications at this time    Psychosocial Plan:   Goals:  Verbalizes coping mechanisms  Reduce manifestation of stress  Maintain positive support system  Maintain positive outlook  Improve overall quality of  life    Interventions/Recommendations:  Discussed Results of Surveys  Patient to Self Report Emotional Changes at Session Check In  Recommend Physical Activity  Recommend Attending Education Lectures  Notify MD: No  Program Referral: No  Pharmaceutical Intervention/Therapy: No  Other Needs: not applicable  Stage of Readiness to Change: Contemplation    Education:  Stress management, verbalizes understanding; Date:12/23/2021  Stress, verbalizes understanding; Date:12/23/2021    Comments:  Pt caring for her , verbalized increased stress with hurricaine. Pt instructed on resources for managing stress verbalized understanding.   Patient has been instructed to notify staff in the event that circumstances worsen.  Patient verbalizes understanding.    Other Core Components/Risk Factors Assessment:   RISK FACTORS:  hyperlipidemia, hypertension, positive family history, sedentary lifestyle, stress    Learning Barriers: None    Education Level:  Unknown    Pre-test Score: 50    Medication Compliance: has been compliant with taking medications    Other Core Components/Risk Factors Plan:   Goals:  Increase exercise tolerance: In Progress  Increase knowledge of CAD: In Progress  Decrease blood pressure: In Progress  Identify and manage personal areas of stress: In Progress  Learn more about healthy eating: In Progress    Interventions/Recommendations:  Recommend regular attendance for Cardiac Rehab: Exercise and Education Lectures  Encourage medication compliance  Individual Education/ Counseling: No  Physician Referral: No    Education:    blood pressure meds, verbalizes understanding; Date: 12/23/2021  cholesterol, verbalizes understanding; Date: 12/23/2021  cholesterol meds, verbalizes understanding; Date: 12/23/2021  hypertension, verbalizes understanding; Date: 12/23/2021  physical activity, verbalizes understanding; Date: 12/23/2021  risk factors, verbalizes understanding; Date: 12/23/2021  stress, verbalizes  understanding; Date: 12/23/2021        Education method adapted to patients education level and preferred method of learning.  Method: explanation    Comments:  Pt instructed on risk factors, medication compliance, importance of exercise, benefits of cardiac rehab, reduction of stress and effects on the body. Pt verbalized understanding.    Other Core Components/Hypertension Assessment:   Resting BP:   BP Readings from Last 1 Encounters:   07/14/22 (!) 162/67         BP Diagnosis: Hypertensive  Patient reported symptoms: none    Other Core Components/Hypertension Plan:   Goals:  Blood Pressure <130/80    Interventions/Recommendations:  Encourage medication compliance  Recommend physical activity  Educate on contributory factors  Reduce stress, anxiety, anger, depression, and/or chronic pain    Education:    Hypertension; verbalizes understanding; Date: 12/23/2021  Coronary Artery Disease; verbalizes understanding; Date: 12/23/2021  Risk Factors; verbalizes understanding; Date: 12/23/2021  Stress; verbalizes understanding; Date: 12/23/2021        Comments:  Pt reports compliance with medications. Pt instructed on taking medications prior to cardiac rehab, monitoring blood pressure, exercise to reduce stress levels; pt verbalized understanding.       Does the patient have Heart Failure? No    Other Core Components/Tobacco Cessation Assessment:   Smoking Status: lifetime non-smoker  Smoking Cessation Barriers: NA  Stage of Readiness to Change: Maintenance    Other Core Components/Tobacco Cessation Plan:   Goals:  Maintain non-smoking status    Interventions:  Maintains non-smoking status    Education:    Benefits of Cardiac Rehab; verbalizes understanding; Date: 12/23/2021        Comments:  Pt nonsmoker lifelong.    Discussed Cardiac Rehab program in depth with patient.  Medication list updated per patient & marked as reviewed.  Patient has been instructed to notify staff of any problems while attending rehab (ie:  chest pain, shortness of breath, lightheadedness, dizziness).  Patient has been instructed to monitor blood pressure readings outside of rehab & to keep a daily log of the readings.  Patient verbalizes understanding.    Roberth Mota RN  Cardiac Rehab Nurse

## 2022-09-01 ENCOUNTER — DOCUMENTATION ONLY (OUTPATIENT)
Dept: CARDIAC REHAB | Facility: CLINIC | Age: 87
End: 2022-09-01
Payer: MEDICARE

## 2022-09-01 NOTE — PROGRESS NOTES
Miss Lutz has completed 3 out of 36 exercise session of Phase II cardiac rehab.  A follow up reassessment will be completed at 12 sessions. Currently, Miss Lutz is on hold due to a family emergency.    As of 22, Miss Lutz is now out of the 36 week time period to return to the program and has been discharged from the program.     Session: Orientation   Cardiac Rehab Individual Treatment Plan - Initial Assessment      Patient Name: Nelda Rawls MRN: [id number]   : 1935   Age: 87 y.o.   Primary Diagnosis: CABG  Date of Event: 10-14-21  EF: ?%  Risk Stratification: high  Referring Physician: YE   Exercise Assessment:     CPX/TM Date: 21 Results   RHR 88   Max    Peak VO2 (CPX only) 9.5   Actual METS (CPX only) 2.7   Estimated METS 2.0     Anthropometrics    Height 61 inches   Weight 130 lbs   BMI 24.6   Abdominal Girth 36   Body Composition 27.3%     ST Depression noted on Stress Test?:No  Angina with exercise?: No   Fall Risk: No   Assistive Devices:  walker   Currently exercising? No   There were no limitations noted by the patient.      Exercise Plan:   Goals:  CR Exercise Goals: Attend Cardiac Rehab 3 times/week: In Progress  Home Aerobic Exercise: 2 additional days/week for 30-60 minutes: In Progress  Intensity of 12-15 on the Rate of Perceived Exertion (RPE) scale: In Progress  30% increase in entry estimated METS: 2.6 : In Progress  5 days/week for 30-60 minutes: In Progress    Intervention:   Discussed importance of regular attendance to cardiac rehab class    Exercise Prescription:  THR Range    Mode: Treadmill  Recumbent Bike  Nustep  Elliptical   Frequency:  3 days/week   Duration:  30 - 60 minutes   Intensity:  12 - 15 RPE   Resistance Training:  Yes: 0 to 5 lb weights with 10-15 reps based on strength and range of motion assessment     Home Prescription:  Mode Aerobic   Frequency: 2- 3 days/week   Duration: 30-60 minutes   Resistance Training: None         Education:  Orientation to Equipment; verbalizes understanding; Date: 21  Exercise Recommendations; verbalizes understanding; Date: 21  Exercise Safety; verbalizes understanding; Date: 21  Class Preparation: verbalizes understanding; Date: 21  Signs and symptoms to report: verbalizes understanding; Date: 21  Caffeine/Hydration: verbalizes understanding; Date: 21  Exercise Terminology: verbalizes understanding; Date: 21  Resistance Training: verbalizes understanding; Date: 21    Comments:  Miss Lutz was encouraged to begin thinking about some type of aerobic exercise she can participate in at least 2 non-rehab days per week for at least 30 minutes in addition to attending Phase II cardiac rehab classes 3 days per week.  I suggested she walk around her home 10 minutes 3 times per day but to also work toward that goal slowly.  She stated understanding.    All consent forms were signed, proper attire and shoes were discussed.       Miss Lutz will begin Cardiac Rehab on  at 10:00am.    The exercise prescription will be adjusted based on tolerance of exercise intensity by patient.    Sohan Singh, CEP    Orientation Cardiac Rehab Individual Treatment Plan - Initial Assessment      Patient Name: Nelda Rawls MRN: [id number]   : 1935   Age: 87 y.o.   Primary Diagnosis: s/p CABG, CAD, HTN, HLD, PVD, CHF, complete heart block, + pacemaker, h/o AVR, CKD III    Nutrition Assessment:     Anthropometrics    Height 61 inches   Weight 130 lbs   BMI 24.6   Abdominal Girth 36   Body Composition 27.3       Drug Allergies and Intolerances:  Review of patient's allergies indicates:   Allergen Reactions    Penicillins Hives and Swelling       Food Allergies and Intolerances:  NA    Past Medical History:  No past medical history on file.    Past Surgical History:  Past Surgical History:   Procedure Laterality Date    CORONARY ARTERY BYPASS GRAFT          Medications:  Current Outpatient Medications   Medication Sig    acetaminophen (TYLENOL) 325 MG tablet Take 325 mg by mouth daily as needed for Pain.    amLODIPine (NORVASC) 2.5 MG tablet Take 1 tablet (2.5 mg total) by mouth nightly.    aspirin 325 MG tablet Take 1 tablet (325 mg total) by mouth once daily.    atorvastatin (LIPITOR) 40 MG tablet Take 1 tablet (40 mg total) by mouth once daily.    calcium carbonate (TUMS) 200 mg calcium (500 mg) chewable tablet Take 2 tablets by mouth 2 (two) times daily as needed for Heartburn.    furosemide (LASIX) 40 MG tablet Take 1 tablet (40 mg total) by mouth daily as needed (for shortness of breath/  weight gain of > 2lb). (Patient not taking: Reported on 7/14/2022)    loratadine (CLARITIN) 10 mg tablet Take 10 mg by mouth once daily.    metoprolol succinate (TOPROL-XL) 25 MG 24 hr tablet Take 1 tablet (25 mg total) by mouth once daily.    multivitamin (THERAGRAN) per tablet Take 1 tablet by mouth once daily.    oxymetazoline (AFRIN) 0.05 % nasal spray 1-2 sprays by Nasal route daily as needed for Congestion.    valsartan (DIOVAN) 320 MG tablet Take 1 tablet (320 mg total) by mouth once daily.     No current facility-administered medications for this visit.       Vitamins and Supplements:  MVI, micro-K 10meq BID (lasix 40mg BID)    Labs:  Patient confirms she is taking lipitor 40mg for cholesterol control.    Lab Results   Component Value Date    CHOL 151 06/30/2022     Lab Results   Component Value Date    HDL 65 06/30/2022     Lab Results   Component Value Date    LDLCALC 67.6 06/30/2022     Lab Results   Component Value Date    TRIG 92 06/30/2022     Lab Results   Component Value Date    CHOLHDL 43.0 06/30/2022         Lab Results   Component Value Date    GLUF 96 12/13/2021     Lab Results   Component Value Date    HGBA1C 5.3 07/04/2022       Nutrition/Diet History:  Patient eats 3 meals daily.    Seasons food with Mrs. Nagy.  Patient denies use of a salt shaker at  the table on prepared foods.   Dines out 1 per week at restaurants such as [employer], [employer].    Chooses fried foods rarely  Chooses fish rarely   Beverages:  water, coffee  Alcohol: none    24 Hour Recall:  Breakfast: oatmeal, banana, black coffee x 2  Lunch:turkey with american cheese, miracle whip/mustard on honey wheat bread, veggie chips  Dinner:roast with boiled potatoes, emma slaw, peach cobbler  Other: 3 sandwich cookies    Difficulty Chewing or Swallowing: no  Current Exercise: See Exercise Physiologist Note  Food Safety/Food Preparation:  pt has daughters/helper that prepares food and freezes  Living Arrangements/Family Support: Lives with spouse  Cultural/Spiritual/Personal Preferences: not applicable   Barriers to Education: none identified  Stage of Change Related to Diet Habits: Contemplation    Nutrition Diagnosis:  Food and nutrition related knowledge deficit related to the lack of prior nutrition education as evidenced by diet history and 24 hour recall    Nutrition Plan:   Goals:  LDL-C < 70 (for high risk patients)  Hgb A1c < 7%  BMI < 25 and abdominal girth < 40M/<35 F  2 gram sodium, Mediterranean diet  Rehab weight goal: Maintenance  Fish intake (non-fried varieties) to a goal of 2-3 servings per week.   Increase fruit and vegetable intake    Interventions/Recommendations:  Lab results reviewed and discussed  Nutrition Prescription:  Total Energy Estimated Needs: 8614-1316 Kcal/d for weight maintenance  Method for Estimating Needs: 25-30kcal/kg  Total Protein Estimated Needs: 35-59g/d  Method for Estimating Needs: 0.6-1 g/Kg BW (reduced needs r/t dx CKD III)  Total Fluid Estimated Needs: 1 mL/Kcal  Dietitian Consult: No  Patient to participate in Cardiac Rehab sessions three times a week  Weekly Dietitian Weight Check  Encouraged patient to complete 3 day food diary  Follow Up Plan for Ongoing Self-Management Support    Education:  Mediterranean Diet; verbalizes  understanding; Date: 21  Person taught: patient and daughter  Preferred Learning Method: Verbal and written  Education Needed/Provided: Nutrition counseling and education related to cardiac rehabilitation  Education Method: Weekly nutrition lectures on the Mediterranean diet, cooking, shopping, and dining out  Written Materials Provided: 3 Day Food Record, Introduction to Mediterranean Diet  Strategies Implemented: Motivational interviewing, Goal setting, Self-Monitoring, and Problem Solving    Comments:   Discussed ways to incorporate healthy snacks, eating on a schedule, and monitoring sodium intake for heart health.  Encouraged increased produce intake and increased hydration    Diabetes  Is the patient diabetic? No      RD contact information provided.      Brandi Quintero MS, RDN/LDN    Session: Orientation Cardiac Rehab Individual Treatment Plan - Initial Assessment      Patient Name: Nelda Rawls MRN: [id number]   : 1935   Age: 87 y.o.   Date of Event: 10/14/2021   Primary Diagnosis: CABG    EF: unknown    Physical Assessment:   There were no vitals taken for this visit.    ASSESSMENT:  Heart Sounds: regular rate and rhythm, S1, S2 normal, no murmur, click, rub or gallop  Prosthetic Valve: Yes  Lung Sounds: normal air entry, lungs clear to auscultation  Capillary Refill: normal  Left Radial Pulse: Normal (+2)  Right Radial Pulse: Normal (+2)  Left Pedal Pulse: Normal (+2)  Right Pedal Pulse: Normal (+2)  Right Edema: none  Left Edema none  Strength: normal  Range of Motion: full range of motion  Existing Limitations:      Site   [] Arthritis, bursitis    [] Amputation, atrophy    [] Other:    []       Diabetic patient's foot examination comments: None -  not diabetic  Incisional site: CABG site healing well, no drainage or redness noted  Special needs: pt currently ambulating with a walker    Psychosocial Assessment:   Outcome Survey Tools:    CATE SCORES:   PRE   Anxiety 1   Depression 0    Somatic 1   Hostility 0     SF-36 SCORES:   PRE   Physical Function 27   Social Function 7   Mental Health 29   Pain 10   Change in Health 2   Physical Role Limitation 1   Mental Role Limitation 3   Energy/Fatigue 12   Health Perceptions 22   Total Score 113     PHQ-9:  PHQ-9 Depression Patient Health Questionnaire 7/14/2022   Over the last two weeks how often have you been bothered by little interest or pleasure in doing things 0   Over the last two weeks how often have you been bothered by feeling down, depressed or hopeless 0   Over the last two weeks how often have you been bothered by trouble falling or staying asleep, or sleeping too much -   Over the last two weeks how often have you been bothered by feeling tired or having little energy -   Over the last two weeks how often have you been bothered by a poor appetite or overeating -   Over the last two weeks how often have you been bothered by feeling bad about yourself - or that you are a failure or have let yourself or your family down -   Over the last two weeks how often have you been bothered by trouble concentrating on things, such as reading the newspaper or watching television -   Over the last two weeks how often have you been bothered by moving or speaking so slowly that other people could have noticed. -   Over the last two weeks how often have you been bothered by thoughts that you would be better off dead, or of hurting yourself -   If you checked off any problems, how difficult have these problems made it for you to do your work, take care of things at home or get along with other people? -   Total Score -              Living Arrangements: Lives with spouse  Family Support: children and paid caregiver  Self Reported: Effective Coping Skills and Stress  Displays: happiness and calmness  Medication:  pt does not take medications at this time    Psychosocial Plan:   Goals:  Verbalizes coping mechanisms  Reduce manifestation of stress  Maintain  positive support system  Maintain positive outlook  Improve overall quality of life    Interventions/Recommendations:  Discussed Results of Surveys  Patient to Self Report Emotional Changes at Session Check In  Recommend Physical Activity  Recommend Attending Education Lectures  Notify MD: No  Program Referral: No  Pharmaceutical Intervention/Therapy: No  Other Needs: not applicable  Stage of Readiness to Change: Contemplation    Education:  Stress management, verbalizes understanding; Date:12/23/2021  Stress, verbalizes understanding; Date:12/23/2021    Comments:  Pt caring for her , verbalized increased stress with hurricaine. Pt instructed on resources for managing stress verbalized understanding.   Patient has been instructed to notify staff in the event that circumstances worsen.  Patient verbalizes understanding.    Other Core Components/Risk Factors Assessment:   RISK FACTORS:  hyperlipidemia, hypertension, positive family history, sedentary lifestyle, stress    Learning Barriers: None    Education Level:  Unknown    Pre-test Score: 50    Medication Compliance: has been compliant with taking medications    Other Core Components/Risk Factors Plan:   Goals:  Increase exercise tolerance: In Progress  Increase knowledge of CAD: In Progress  Decrease blood pressure: In Progress  Identify and manage personal areas of stress: In Progress  Learn more about healthy eating: In Progress    Interventions/Recommendations:  Recommend regular attendance for Cardiac Rehab: Exercise and Education Lectures  Encourage medication compliance  Individual Education/ Counseling: No  Physician Referral: No    Education:    blood pressure meds, verbalizes understanding; Date: 12/23/2021  cholesterol, verbalizes understanding; Date: 12/23/2021  cholesterol meds, verbalizes understanding; Date: 12/23/2021  hypertension, verbalizes understanding; Date: 12/23/2021  physical activity, verbalizes understanding; Date: 12/23/2021  risk  factors, verbalizes understanding; Date: 12/23/2021  stress, verbalizes understanding; Date: 12/23/2021         Education method adapted to patients education level and preferred method of learning.  Method: explanation    Comments:  Pt instructed on risk factors, medication compliance, importance of exercise, benefits of cardiac rehab, reduction of stress and effects on the body. Pt verbalized understanding.    Other Core Components/Hypertension Assessment:   Resting BP:   BP Readings from Last 1 Encounters:   07/14/22 (!) 162/67         BP Diagnosis: Hypertensive  Patient reported symptoms: none    Other Core Components/Hypertension Plan:   Goals:  Blood Pressure <130/80    Interventions/Recommendations:  Encourage medication compliance  Recommend physical activity  Educate on contributory factors  Reduce stress, anxiety, anger, depression, and/or chronic pain    Education:    Hypertension; verbalizes understanding; Date: 12/23/2021  Coronary Artery Disease; verbalizes understanding; Date: 12/23/2021  Risk Factors; verbalizes understanding; Date: 12/23/2021  Stress; verbalizes understanding; Date: 12/23/2021         Comments:  Pt reports compliance with medications. Pt instructed on taking medications prior to cardiac rehab, monitoring blood pressure, exercise to reduce stress levels; pt verbalized understanding.       Does the patient have Heart Failure? No    Other Core Components/Tobacco Cessation Assessment:   Smoking Status: lifetime non-smoker  Smoking Cessation Barriers:  NA  Stage of Readiness to Change: Maintenance    Other Core Components/Tobacco Cessation Plan:   Goals:  Maintain non-smoking status    Interventions:  Maintains non-smoking status    Education:    Benefits of Cardiac Rehab; verbalizes understanding; Date: 12/23/2021         Comments:  Pt nonsmoker lifelong.    Discussed Cardiac Rehab program in depth with patient.  Medication list updated per patient & marked as reviewed.  Patient has  been instructed to notify staff of any problems while attending rehab (ie: chest pain, shortness of breath, lightheadedness, dizziness).  Patient has been instructed to monitor blood pressure readings outside of rehab & to keep a daily log of the readings.  Patient verbalizes understanding.    Roberth Mota RN  Cardiac Rehab Nurse

## 2022-10-05 DIAGNOSIS — I10 PRIMARY HYPERTENSION: ICD-10-CM

## 2022-10-05 RX ORDER — AMLODIPINE BESYLATE 2.5 MG/1
2.5 TABLET ORAL NIGHTLY
Qty: 30 TABLET | Refills: 11 | Status: SHIPPED | OUTPATIENT
Start: 2022-10-05 | End: 2023-06-13

## 2022-10-12 ENCOUNTER — OFFICE VISIT (OUTPATIENT)
Dept: CARDIOLOGY | Facility: CLINIC | Age: 87
End: 2022-10-12
Payer: MEDICARE

## 2022-10-12 VITALS
DIASTOLIC BLOOD PRESSURE: 70 MMHG | HEART RATE: 70 BPM | HEIGHT: 61 IN | WEIGHT: 127.44 LBS | SYSTOLIC BLOOD PRESSURE: 145 MMHG | BODY MASS INDEX: 24.06 KG/M2

## 2022-10-12 DIAGNOSIS — I10 PRIMARY HYPERTENSION: Chronic | ICD-10-CM

## 2022-10-12 DIAGNOSIS — Z95.1 STATUS POST CORONARY ARTERY BYPASS GRAFTING: Chronic | ICD-10-CM

## 2022-10-12 DIAGNOSIS — I25.10 CORONARY ARTERY DISEASE INVOLVING NATIVE CORONARY ARTERY OF NATIVE HEART WITHOUT ANGINA PECTORIS: Chronic | ICD-10-CM

## 2022-10-12 DIAGNOSIS — I35.0 AORTIC STENOSIS, SEVERE: Chronic | ICD-10-CM

## 2022-10-12 DIAGNOSIS — Z95.3 HISTORY OF AORTIC VALVE REPLACEMENT WITH BIOPROSTHETIC VALVE: Chronic | ICD-10-CM

## 2022-10-12 DIAGNOSIS — I50.32 CHRONIC DIASTOLIC CONGESTIVE HEART FAILURE: Primary | Chronic | ICD-10-CM

## 2022-10-12 DIAGNOSIS — E78.00 PURE HYPERCHOLESTEROLEMIA: Chronic | ICD-10-CM

## 2022-10-12 DIAGNOSIS — Z95.0 CARDIAC RESYNCHRONIZATION THERAPY PACEMAKER (CRT-P) IN PLACE: Chronic | ICD-10-CM

## 2022-10-12 DIAGNOSIS — I44.2 CHB (COMPLETE HEART BLOCK): Chronic | ICD-10-CM

## 2022-10-12 PROBLEM — I50.9 CONGESTIVE HEART FAILURE: Chronic | Status: ACTIVE | Noted: 2021-10-04

## 2022-10-12 PROCEDURE — 1160F RVW MEDS BY RX/DR IN RCRD: CPT | Mod: CPTII,S$GLB,, | Performed by: INTERNAL MEDICINE

## 2022-10-12 PROCEDURE — 1101F PR PT FALLS ASSESS DOC 0-1 FALLS W/OUT INJ PAST YR: ICD-10-PCS | Mod: CPTII,S$GLB,, | Performed by: INTERNAL MEDICINE

## 2022-10-12 PROCEDURE — 1126F AMNT PAIN NOTED NONE PRSNT: CPT | Mod: CPTII,S$GLB,, | Performed by: INTERNAL MEDICINE

## 2022-10-12 PROCEDURE — 3288F PR FALLS RISK ASSESSMENT DOCUMENTED: ICD-10-PCS | Mod: CPTII,S$GLB,, | Performed by: INTERNAL MEDICINE

## 2022-10-12 PROCEDURE — 1159F MED LIST DOCD IN RCRD: CPT | Mod: CPTII,S$GLB,, | Performed by: INTERNAL MEDICINE

## 2022-10-12 PROCEDURE — 99214 OFFICE O/P EST MOD 30 MIN: CPT | Mod: S$GLB,,, | Performed by: INTERNAL MEDICINE

## 2022-10-12 PROCEDURE — 1160F PR REVIEW ALL MEDS BY PRESCRIBER/CLIN PHARMACIST DOCUMENTED: ICD-10-PCS | Mod: CPTII,S$GLB,, | Performed by: INTERNAL MEDICINE

## 2022-10-12 PROCEDURE — 99999 PR PBB SHADOW E&M-EST. PATIENT-LVL III: CPT | Mod: PBBFAC,,, | Performed by: INTERNAL MEDICINE

## 2022-10-12 PROCEDURE — 1101F PT FALLS ASSESS-DOCD LE1/YR: CPT | Mod: CPTII,S$GLB,, | Performed by: INTERNAL MEDICINE

## 2022-10-12 PROCEDURE — 3288F FALL RISK ASSESSMENT DOCD: CPT | Mod: CPTII,S$GLB,, | Performed by: INTERNAL MEDICINE

## 2022-10-12 PROCEDURE — 99214 PR OFFICE/OUTPT VISIT, EST, LEVL IV, 30-39 MIN: ICD-10-PCS | Mod: S$GLB,,, | Performed by: INTERNAL MEDICINE

## 2022-10-12 PROCEDURE — 99999 PR PBB SHADOW E&M-EST. PATIENT-LVL III: ICD-10-PCS | Mod: PBBFAC,,, | Performed by: INTERNAL MEDICINE

## 2022-10-12 PROCEDURE — 1126F PR PAIN SEVERITY QUANTIFIED, NO PAIN PRESENT: ICD-10-PCS | Mod: CPTII,S$GLB,, | Performed by: INTERNAL MEDICINE

## 2022-10-12 PROCEDURE — 1159F PR MEDICATION LIST DOCUMENTED IN MEDICAL RECORD: ICD-10-PCS | Mod: CPTII,S$GLB,, | Performed by: INTERNAL MEDICINE
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
## 2022-11-01 ENCOUNTER — CLINICAL SUPPORT (OUTPATIENT)
Dept: CARDIOLOGY | Facility: HOSPITAL | Age: 87
End: 2022-11-01
Payer: MEDICARE

## 2022-11-01 DIAGNOSIS — Z95.0 PRESENCE OF CARDIAC PACEMAKER: ICD-10-CM

## 2022-11-01 PROCEDURE — 93294 CARDIAC DEVICE CHECK - REMOTE: ICD-10-PCS | Mod: ,,, | Performed by: INTERNAL MEDICINE

## 2022-11-01 PROCEDURE — 93296 REM INTERROG EVL PM/IDS: CPT | Performed by: INTERNAL MEDICINE

## 2022-11-01 PROCEDURE — 93294 REM INTERROG EVL PM/LDLS PM: CPT | Mod: ,,, | Performed by: INTERNAL MEDICINE

## 2023-01-30 ENCOUNTER — CLINICAL SUPPORT (OUTPATIENT)
Dept: CARDIOLOGY | Facility: HOSPITAL | Age: 88
End: 2023-01-30
Payer: MEDICARE

## 2023-01-30 DIAGNOSIS — Z95.0 PRESENCE OF CARDIAC PACEMAKER: ICD-10-CM

## 2023-01-30 PROCEDURE — 93296 REM INTERROG EVL PM/IDS: CPT | Performed by: INTERNAL MEDICINE

## 2023-05-10 ENCOUNTER — TELEPHONE (OUTPATIENT)
Dept: CARDIOLOGY | Facility: CLINIC | Age: 88
End: 2023-05-10
Payer: MEDICARE

## 2023-05-10 NOTE — TELEPHONE ENCOUNTER
Discussed with daughter, Jennifer evaluate her home and told him that she had blockages in her legs.  She does already have a diagnosis of peripheral vascular disease.  She does not have any open sores.  She does not have pain in the legs.  The patient does have coronary artery disease.  She is on treatment for atherosclerosis with high-dose statin therapy.  Her blood pressure is controlled.  She takes aspirin 325 mg a day.    Given absence of symptoms related to peripheral arterial disease, no further evaluation would be indicated at present.

## 2023-05-10 NOTE — TELEPHONE ENCOUNTER
----- Message from Amy Fernandez sent at 5/10/2023  8:38 AM CDT -----  Regarding: Pt's Daughter Eda Joiner called to speak to the nurse regarding the pt's care due to finding out that the pt has blockage during a wellness visit and she would like an immediate call back this morning asap  Patient Advice:    Pt's Daughter Eda Joiner called to speak to the nurse regarding the pt's care due to finding out that the pt has blockage during a wellness visit last week and she would like an immediate call back this morning asap.    Ms Veras stated that she left previous messages and no one returned her call.    Ms Veras can be reached at [phone]

## 2023-05-30 DIAGNOSIS — E78.00 PURE HYPERCHOLESTEROLEMIA: Chronic | ICD-10-CM

## 2023-05-30 RX ORDER — ATORVASTATIN CALCIUM 40 MG/1
40 TABLET, FILM COATED ORAL DAILY
Qty: 90 TABLET | Refills: 3 | Status: SHIPPED | OUTPATIENT
Start: 2023-05-30

## 2023-06-04 ENCOUNTER — CLINICAL SUPPORT (OUTPATIENT)
Dept: CARDIOLOGY | Facility: HOSPITAL | Age: 88
End: 2023-06-04
Attending: INTERNAL MEDICINE
Payer: MEDICARE

## 2023-06-04 DIAGNOSIS — Z95.0 PRESENCE OF CARDIAC PACEMAKER: ICD-10-CM

## 2023-06-04 PROCEDURE — 93294 CARDIAC DEVICE CHECK - REMOTE: ICD-10-PCS | Mod: ,,, | Performed by: INTERNAL MEDICINE

## 2023-06-04 PROCEDURE — 93294 REM INTERROG EVL PM/LDLS PM: CPT | Mod: ,,, | Performed by: INTERNAL MEDICINE

## 2023-06-04 PROCEDURE — 93296 REM INTERROG EVL PM/IDS: CPT | Performed by: INTERNAL MEDICINE

## 2023-06-13 ENCOUNTER — OFFICE VISIT (OUTPATIENT)
Dept: CARDIOLOGY | Facility: CLINIC | Age: 88
End: 2023-06-13
Payer: MEDICARE

## 2023-06-13 VITALS
BODY MASS INDEX: 25.89 KG/M2 | WEIGHT: 137.13 LBS | HEART RATE: 77 BPM | HEIGHT: 61 IN | DIASTOLIC BLOOD PRESSURE: 57 MMHG | SYSTOLIC BLOOD PRESSURE: 129 MMHG

## 2023-06-13 DIAGNOSIS — I35.0 AORTIC STENOSIS, SEVERE: Primary | Chronic | ICD-10-CM

## 2023-06-13 DIAGNOSIS — Z95.0 CARDIAC RESYNCHRONIZATION THERAPY PACEMAKER (CRT-P) IN PLACE: Chronic | ICD-10-CM

## 2023-06-13 DIAGNOSIS — I50.32 CHRONIC DIASTOLIC CONGESTIVE HEART FAILURE: ICD-10-CM

## 2023-06-13 DIAGNOSIS — Z95.1 STATUS POST CORONARY ARTERY BYPASS GRAFTING: Chronic | ICD-10-CM

## 2023-06-13 DIAGNOSIS — I10 PRIMARY HYPERTENSION: Chronic | ICD-10-CM

## 2023-06-13 DIAGNOSIS — R60.0 LOCALIZED EDEMA: ICD-10-CM

## 2023-06-13 DIAGNOSIS — E87.1 HYPONATREMIA: ICD-10-CM

## 2023-06-13 DIAGNOSIS — Z95.3 HISTORY OF AORTIC VALVE REPLACEMENT WITH BIOPROSTHETIC VALVE: Chronic | ICD-10-CM

## 2023-06-13 DIAGNOSIS — I44.2 CHB (COMPLETE HEART BLOCK): Chronic | ICD-10-CM

## 2023-06-13 DIAGNOSIS — I25.10 CORONARY ARTERY DISEASE INVOLVING NATIVE CORONARY ARTERY OF NATIVE HEART WITHOUT ANGINA PECTORIS: Chronic | ICD-10-CM

## 2023-06-13 DIAGNOSIS — I73.9 PERIPHERAL VASCULAR DISEASE: Chronic | ICD-10-CM

## 2023-06-13 DIAGNOSIS — E78.00 PURE HYPERCHOLESTEROLEMIA: Chronic | ICD-10-CM

## 2023-06-13 PROCEDURE — 3288F PR FALLS RISK ASSESSMENT DOCUMENTED: ICD-10-PCS | Mod: CPTII,S$GLB,, | Performed by: INTERNAL MEDICINE

## 2023-06-13 PROCEDURE — 99999 PR PBB SHADOW E&M-EST. PATIENT-LVL IV: ICD-10-PCS | Mod: PBBFAC,,, | Performed by: INTERNAL MEDICINE

## 2023-06-13 PROCEDURE — 1159F MED LIST DOCD IN RCRD: CPT | Mod: CPTII,S$GLB,, | Performed by: INTERNAL MEDICINE

## 2023-06-13 PROCEDURE — 1160F PR REVIEW ALL MEDS BY PRESCRIBER/CLIN PHARMACIST DOCUMENTED: ICD-10-PCS | Mod: CPTII,S$GLB,, | Performed by: INTERNAL MEDICINE

## 2023-06-13 PROCEDURE — 1101F PT FALLS ASSESS-DOCD LE1/YR: CPT | Mod: CPTII,S$GLB,, | Performed by: INTERNAL MEDICINE

## 2023-06-13 PROCEDURE — 1101F PR PT FALLS ASSESS DOC 0-1 FALLS W/OUT INJ PAST YR: ICD-10-PCS | Mod: CPTII,S$GLB,, | Performed by: INTERNAL MEDICINE

## 2023-06-13 PROCEDURE — 3288F FALL RISK ASSESSMENT DOCD: CPT | Mod: CPTII,S$GLB,, | Performed by: INTERNAL MEDICINE

## 2023-06-13 PROCEDURE — 1159F PR MEDICATION LIST DOCUMENTED IN MEDICAL RECORD: ICD-10-PCS | Mod: CPTII,S$GLB,, | Performed by: INTERNAL MEDICINE

## 2023-06-13 PROCEDURE — 99999 PR PBB SHADOW E&M-EST. PATIENT-LVL IV: CPT | Mod: PBBFAC,,, | Performed by: INTERNAL MEDICINE

## 2023-06-13 PROCEDURE — 1126F PR PAIN SEVERITY QUANTIFIED, NO PAIN PRESENT: ICD-10-PCS | Mod: CPTII,S$GLB,, | Performed by: INTERNAL MEDICINE

## 2023-06-13 PROCEDURE — 99214 PR OFFICE/OUTPT VISIT, EST, LEVL IV, 30-39 MIN: ICD-10-PCS | Mod: S$GLB,,, | Performed by: INTERNAL MEDICINE

## 2023-06-13 PROCEDURE — 1126F AMNT PAIN NOTED NONE PRSNT: CPT | Mod: CPTII,S$GLB,, | Performed by: INTERNAL MEDICINE

## 2023-06-13 PROCEDURE — 1160F RVW MEDS BY RX/DR IN RCRD: CPT | Mod: CPTII,S$GLB,, | Performed by: INTERNAL MEDICINE

## 2023-06-13 PROCEDURE — 99214 OFFICE O/P EST MOD 30 MIN: CPT | Mod: S$GLB,,, | Performed by: INTERNAL MEDICINE

## 2023-06-13 RX ORDER — FUROSEMIDE 20 MG/1
20 TABLET ORAL
COMMUNITY

## 2023-06-13 RX ORDER — BUMETANIDE 0.5 MG/1
0.5 TABLET ORAL DAILY PRN
COMMUNITY
Start: 2023-06-07 | End: 2023-06-13
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
## 2023-06-13 NOTE — PATIENT INSTRUCTIONS
1. Discontinue amlodipine   2. Treat lower extremity swelling with local measures, support hose, Ace wraps, elevation  3. Increase sodium in the diet, avoid increasing dosages of diuretics with hyponatremia/low-sodium.

## 2023-06-26 RX ORDER — METOPROLOL SUCCINATE 25 MG/1
25 TABLET, EXTENDED RELEASE ORAL DAILY
Qty: 90 TABLET | Refills: 3 | Status: SHIPPED | OUTPATIENT
Start: 2023-06-26 | End: 2024-03-18

## 2023-06-27 ENCOUNTER — LAB VISIT (OUTPATIENT)
Dept: LAB | Facility: HOSPITAL | Age: 88
End: 2023-06-27
Attending: INTERNAL MEDICINE
Payer: MEDICARE

## 2023-06-27 DIAGNOSIS — E87.1 HYPONATREMIA: ICD-10-CM

## 2023-06-27 LAB
ANION GAP SERPL CALC-SCNC: 10 MMOL/L (ref 8–16)
BUN SERPL-MCNC: 17 MG/DL (ref 8–23)
CALCIUM SERPL-MCNC: 9.8 MG/DL (ref 8.7–10.5)
CHLORIDE SERPL-SCNC: 104 MMOL/L (ref 95–110)
CO2 SERPL-SCNC: 23 MMOL/L (ref 23–29)
CREAT SERPL-MCNC: 0.9 MG/DL (ref 0.5–1.4)
EST. GFR  (NO RACE VARIABLE): >60 ML/MIN/1.73 M^2
GLUCOSE SERPL-MCNC: 89 MG/DL (ref 70–110)
POTASSIUM SERPL-SCNC: 4.9 MMOL/L (ref 3.5–5.1)
SODIUM SERPL-SCNC: 137 MMOL/L (ref 136–145)

## 2023-06-27 PROCEDURE — 36415 COLL VENOUS BLD VENIPUNCTURE: CPT | Performed by: INTERNAL MEDICINE

## 2023-06-27 PROCEDURE — 80048 BASIC METABOLIC PNL TOTAL CA: CPT | Performed by: INTERNAL MEDICINE

## 2023-07-03 ENCOUNTER — TELEPHONE (OUTPATIENT)
Dept: CARDIOLOGY | Facility: CLINIC | Age: 88
End: 2023-07-03
Payer: MEDICARE

## 2023-07-17 ENCOUNTER — TELEPHONE (OUTPATIENT)
Dept: CARDIOLOGY | Facility: CLINIC | Age: 88
End: 2023-07-17
Payer: MEDICARE

## 2023-07-17 NOTE — TELEPHONE ENCOUNTER
"----- Message from Radha Duran sent at 7/17/2023  9:02 AM CDT -----  Regarding: Call back  "Type:  Patient Call Back    Who Called:Eda (Daughter)    What is the reqeust in detail:Requesting information on pt pacemaker need make,model and when she received pacemaker pt will be having an MRI. Please advise    Can the clinic reply by [facility]?yes    Best Call Back Number:[phone]      Additional Information:            "

## 2023-07-26 DIAGNOSIS — I10 PRIMARY HYPERTENSION: ICD-10-CM

## 2023-07-26 RX ORDER — VALSARTAN 320 MG/1
320 TABLET ORAL DAILY
Qty: 90 TABLET | Refills: 3 | Status: SHIPPED | OUTPATIENT
Start: 2023-07-26 | End: 2024-07-25

## 2023-08-09 ENCOUNTER — TELEPHONE (OUTPATIENT)
Dept: ELECTROPHYSIOLOGY | Facility: CLINIC | Age: 88
End: 2023-08-09
Payer: MEDICARE

## 2023-08-09 NOTE — TELEPHONE ENCOUNTER
Received request from  for MRI checklist.    Pt has 2 leads in [facility]. Verified with MDT KARI Fernandez that PPM is not MRI conditional.  Spoke with Beth at  and faxed form back to  that pt cannot have an MRI.    Called and informed pts daughter that she cannot have MRIs.

## 2023-09-15 ENCOUNTER — OFFICE VISIT (OUTPATIENT)
Dept: CARDIOLOGY | Facility: CLINIC | Age: 88
End: 2023-09-15
Payer: MEDICARE

## 2023-09-15 VITALS
HEIGHT: 61 IN | SYSTOLIC BLOOD PRESSURE: 130 MMHG | DIASTOLIC BLOOD PRESSURE: 80 MMHG | BODY MASS INDEX: 24.92 KG/M2 | WEIGHT: 132 LBS | HEART RATE: 67 BPM

## 2023-09-15 DIAGNOSIS — I35.0 AORTIC STENOSIS, SEVERE: Chronic | ICD-10-CM

## 2023-09-15 DIAGNOSIS — Z95.3 HISTORY OF AORTIC VALVE REPLACEMENT WITH BIOPROSTHETIC VALVE: Chronic | ICD-10-CM

## 2023-09-15 DIAGNOSIS — I10 PRIMARY HYPERTENSION: Chronic | ICD-10-CM

## 2023-09-15 DIAGNOSIS — Z95.0 CARDIAC RESYNCHRONIZATION THERAPY PACEMAKER (CRT-P) IN PLACE: Chronic | ICD-10-CM

## 2023-09-15 DIAGNOSIS — E78.00 PURE HYPERCHOLESTEROLEMIA: Chronic | ICD-10-CM

## 2023-09-15 DIAGNOSIS — I44.2 CHB (COMPLETE HEART BLOCK): Chronic | ICD-10-CM

## 2023-09-15 DIAGNOSIS — I50.32 CHRONIC DIASTOLIC CONGESTIVE HEART FAILURE: Chronic | ICD-10-CM

## 2023-09-15 DIAGNOSIS — I25.10 CORONARY ARTERY DISEASE INVOLVING NATIVE CORONARY ARTERY OF NATIVE HEART WITHOUT ANGINA PECTORIS: Primary | Chronic | ICD-10-CM

## 2023-09-15 DIAGNOSIS — Z95.1 STATUS POST CORONARY ARTERY BYPASS GRAFTING: Chronic | ICD-10-CM

## 2023-09-15 PROCEDURE — 1159F PR MEDICATION LIST DOCUMENTED IN MEDICAL RECORD: ICD-10-PCS | Mod: CPTII,S$GLB,, | Performed by: INTERNAL MEDICINE

## 2023-09-15 PROCEDURE — 99214 OFFICE O/P EST MOD 30 MIN: CPT | Mod: S$GLB,,, | Performed by: INTERNAL MEDICINE

## 2023-09-15 PROCEDURE — 3288F FALL RISK ASSESSMENT DOCD: CPT | Mod: CPTII,S$GLB,, | Performed by: INTERNAL MEDICINE

## 2023-09-15 PROCEDURE — 99999 PR PBB SHADOW E&M-EST. PATIENT-LVL III: CPT | Mod: PBBFAC,,, | Performed by: INTERNAL MEDICINE

## 2023-09-15 PROCEDURE — 1160F PR REVIEW ALL MEDS BY PRESCRIBER/CLIN PHARMACIST DOCUMENTED: ICD-10-PCS | Mod: CPTII,S$GLB,, | Performed by: INTERNAL MEDICINE

## 2023-09-15 PROCEDURE — 1101F PR PT FALLS ASSESS DOC 0-1 FALLS W/OUT INJ PAST YR: ICD-10-PCS | Mod: CPTII,S$GLB,, | Performed by: INTERNAL MEDICINE

## 2023-09-15 PROCEDURE — 1126F PR PAIN SEVERITY QUANTIFIED, NO PAIN PRESENT: ICD-10-PCS | Mod: CPTII,S$GLB,, | Performed by: INTERNAL MEDICINE

## 2023-09-15 PROCEDURE — 99999 PR PBB SHADOW E&M-EST. PATIENT-LVL III: ICD-10-PCS | Mod: PBBFAC,,, | Performed by: INTERNAL MEDICINE

## 2023-09-15 PROCEDURE — 1160F RVW MEDS BY RX/DR IN RCRD: CPT | Mod: CPTII,S$GLB,, | Performed by: INTERNAL MEDICINE

## 2023-09-15 PROCEDURE — 3288F PR FALLS RISK ASSESSMENT DOCUMENTED: ICD-10-PCS | Mod: CPTII,S$GLB,, | Performed by: INTERNAL MEDICINE

## 2023-09-15 PROCEDURE — 1126F AMNT PAIN NOTED NONE PRSNT: CPT | Mod: CPTII,S$GLB,, | Performed by: INTERNAL MEDICINE

## 2023-09-15 PROCEDURE — 99214 PR OFFICE/OUTPT VISIT, EST, LEVL IV, 30-39 MIN: ICD-10-PCS | Mod: S$GLB,,, | Performed by: INTERNAL MEDICINE

## 2023-09-15 PROCEDURE — 1159F MED LIST DOCD IN RCRD: CPT | Mod: CPTII,S$GLB,, | Performed by: INTERNAL MEDICINE

## 2023-09-15 PROCEDURE — 1101F PT FALLS ASSESS-DOCD LE1/YR: CPT | Mod: CPTII,S$GLB,, | Performed by: INTERNAL MEDICINE

## 2023-09-15 RX ORDER — BUSPIRONE HYDROCHLORIDE 15 MG/1
15 TABLET ORAL DAILY
COMMUNITY

## 2023-10-14 ENCOUNTER — CLINICAL SUPPORT (OUTPATIENT)
Dept: CARDIOLOGY | Facility: HOSPITAL | Age: 88
End: 2023-10-14
Attending: INTERNAL MEDICINE
Payer: MEDICARE

## 2023-10-14 ENCOUNTER — CLINICAL SUPPORT (OUTPATIENT)
Dept: CARDIOLOGY | Facility: HOSPITAL | Age: 88
End: 2023-10-14
Payer: MEDICARE

## 2023-10-14 DIAGNOSIS — Z95.0 PRESENCE OF CARDIAC PACEMAKER: ICD-10-CM

## 2023-10-14 PROCEDURE — 93296 REM INTERROG EVL PM/IDS: CPT | Performed by: INTERNAL MEDICINE

## 2023-10-14 PROCEDURE — 93294 CARDIAC DEVICE CHECK CHECK - REMOTE ALERT: ICD-10-PCS | Mod: ,,, | Performed by: INTERNAL MEDICINE

## 2023-10-14 PROCEDURE — 93294 REM INTERROG EVL PM/LDLS PM: CPT | Mod: ,,, | Performed by: INTERNAL MEDICINE

## 2023-10-18 LAB
OHS CV AF BURDEN PERCENT: < 1
OHS CV BIV PACING PERCENT: 0 %
OHS CV DC REMOTE DEVICE TYPE: NORMAL
OHS CV ICD SHOCK: NO
OHS CV RV PACING PERCENT: 0.81 %

## 2023-11-29 ENCOUNTER — TELEPHONE (OUTPATIENT)
Dept: CARDIOLOGY | Facility: CLINIC | Age: 88
End: 2023-11-29
Payer: MEDICARE

## 2023-11-29 NOTE — TELEPHONE ENCOUNTER
----- Message from Patrick Subramanian Jr., MD sent at 11/29/2023  9:10 AM CST -----  Regarding: RE: Advice  Contact: [phone]  Okay to fly  ----- Message -----  From: Tori Mendiola MA  Sent: 11/29/2023   8:57 AM CST  To: Patrick Subramanian Jr., MD  Subject: FW: Advice                                         ----- Message -----  From: Lito Hollins  Sent: 11/29/2023   8:55 AM CST  To: [facility] Staff  Subject: Advice                                           Patient daughter is to speak with someone in office would like to know if its ok that the patient fly from [place] to [place]. Please contact pt

## 2024-01-14 ENCOUNTER — CLINICAL SUPPORT (OUTPATIENT)
Dept: CARDIOLOGY | Facility: HOSPITAL | Age: 89
End: 2024-01-14
Attending: INTERNAL MEDICINE
Payer: MEDICARE

## 2024-01-14 ENCOUNTER — CLINICAL SUPPORT (OUTPATIENT)
Dept: CARDIOLOGY | Facility: HOSPITAL | Age: 89
End: 2024-01-14
Payer: MEDICARE

## 2024-01-14 DIAGNOSIS — Z95.0 PRESENCE OF CARDIAC PACEMAKER: ICD-10-CM

## 2024-01-14 PROCEDURE — 93294 REM INTERROG EVL PM/LDLS PM: CPT | Mod: ,,, | Performed by: INTERNAL MEDICINE

## 2024-01-14 PROCEDURE — 93296 REM INTERROG EVL PM/IDS: CPT | Performed by: INTERNAL MEDICINE

## 2024-01-22 LAB
OHS CV AF BURDEN PERCENT: < 1
OHS CV BIV PACING PERCENT: 0 %
OHS CV DC REMOTE DEVICE TYPE: NORMAL
OHS CV ICD SHOCK: NO
OHS CV RV PACING PERCENT: 1.44 %

## 2024-03-14 ENCOUNTER — OFFICE VISIT (OUTPATIENT)
Dept: CARDIOLOGY | Facility: CLINIC | Age: 89
End: 2024-03-14
Payer: MEDICARE

## 2024-03-14 VITALS
DIASTOLIC BLOOD PRESSURE: 80 MMHG | SYSTOLIC BLOOD PRESSURE: 154 MMHG | HEIGHT: 61 IN | WEIGHT: 138.69 LBS | HEART RATE: 78 BPM | BODY MASS INDEX: 26.19 KG/M2

## 2024-03-14 DIAGNOSIS — Z95.1 STATUS POST CORONARY ARTERY BYPASS GRAFTING: Chronic | ICD-10-CM

## 2024-03-14 DIAGNOSIS — E78.00 PURE HYPERCHOLESTEROLEMIA: Chronic | ICD-10-CM

## 2024-03-14 DIAGNOSIS — I35.0 AORTIC STENOSIS, SEVERE: Chronic | ICD-10-CM

## 2024-03-14 DIAGNOSIS — Z95.3 HISTORY OF AORTIC VALVE REPLACEMENT WITH BIOPROSTHETIC VALVE: Chronic | ICD-10-CM

## 2024-03-14 DIAGNOSIS — I25.10 CORONARY ARTERY DISEASE INVOLVING NATIVE CORONARY ARTERY OF NATIVE HEART WITHOUT ANGINA PECTORIS: Chronic | ICD-10-CM

## 2024-03-14 DIAGNOSIS — I10 PRIMARY HYPERTENSION: Chronic | ICD-10-CM

## 2024-03-14 DIAGNOSIS — I73.9 PERIPHERAL VASCULAR DISEASE: ICD-10-CM

## 2024-03-14 DIAGNOSIS — Z95.0 CARDIAC RESYNCHRONIZATION THERAPY PACEMAKER (CRT-P) IN PLACE: Chronic | ICD-10-CM

## 2024-03-14 DIAGNOSIS — I44.2 CHB (COMPLETE HEART BLOCK): ICD-10-CM

## 2024-03-14 DIAGNOSIS — I50.32 CHRONIC DIASTOLIC CONGESTIVE HEART FAILURE: Primary | ICD-10-CM

## 2024-03-14 PROBLEM — I50.23 ACUTE ON CHRONIC SYSTOLIC CONGESTIVE HEART FAILURE: Status: ACTIVE | Noted: 2024-03-14

## 2024-03-14 PROCEDURE — 1159F MED LIST DOCD IN RCRD: CPT | Mod: CPTII,S$GLB,, | Performed by: INTERNAL MEDICINE

## 2024-03-14 PROCEDURE — 1126F AMNT PAIN NOTED NONE PRSNT: CPT | Mod: CPTII,S$GLB,, | Performed by: INTERNAL MEDICINE

## 2024-03-14 PROCEDURE — 99999 PR PBB SHADOW E&M-EST. PATIENT-LVL III: CPT | Mod: PBBFAC,,, | Performed by: INTERNAL MEDICINE

## 2024-03-14 PROCEDURE — 1101F PT FALLS ASSESS-DOCD LE1/YR: CPT | Mod: CPTII,S$GLB,, | Performed by: INTERNAL MEDICINE

## 2024-03-14 PROCEDURE — 99214 OFFICE O/P EST MOD 30 MIN: CPT | Mod: S$GLB,,, | Performed by: INTERNAL MEDICINE

## 2024-03-14 PROCEDURE — 3288F FALL RISK ASSESSMENT DOCD: CPT | Mod: CPTII,S$GLB,, | Performed by: INTERNAL MEDICINE
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
## 2024-03-18 ENCOUNTER — NURSE TRIAGE (OUTPATIENT)
Dept: ADMINISTRATIVE | Facility: CLINIC | Age: 89
End: 2024-03-18
Payer: MEDICARE

## 2024-03-18 DIAGNOSIS — I10 PRIMARY HYPERTENSION: Primary | Chronic | ICD-10-CM

## 2024-03-18 RX ORDER — HYDRALAZINE HYDROCHLORIDE 25 MG/1
25 TABLET, FILM COATED ORAL EVERY 6 HOURS PRN
Qty: 90 TABLET | Refills: 11 | Status: SHIPPED | OUTPATIENT
Start: 2024-03-18 | End: 2025-03-18

## 2024-03-18 RX ORDER — METOPROLOL SUCCINATE 25 MG/1
25 TABLET, EXTENDED RELEASE ORAL
Qty: 90 TABLET | Refills: 3 | Status: SHIPPED | OUTPATIENT
Start: 2024-03-18

## 2024-03-18 NOTE — TELEPHONE ENCOUNTER
Spoke with pt, and daughter who reports Pt BP is 171/91. States that pt is also having HA which is attributed to sinus. Reports pt does have Hx of heart surgery , and has pacemaker in place. Advised to be seen in ED. States would like call from Dr. Subramanian office regarding matter to see if pt need to go to ED. Will send message to office.       Reason for Disposition   Systolic BP >= 160 OR Diastolic >= 100, and any cardiac (e.g., breathing difficulty, chest pain) or neurologic symptoms (e.g., new-onset blurred or double vision)    Additional Information   Negative: Sounds like a life-threatening emergency to the triager    Protocols used: Blood Pressure - High-A-OH

## 2024-04-14 ENCOUNTER — CLINICAL SUPPORT (OUTPATIENT)
Dept: CARDIOLOGY | Facility: HOSPITAL | Age: 89
End: 2024-04-14
Payer: MEDICARE

## 2024-04-14 ENCOUNTER — CLINICAL SUPPORT (OUTPATIENT)
Dept: CARDIOLOGY | Facility: HOSPITAL | Age: 89
End: 2024-04-14
Attending: INTERNAL MEDICINE
Payer: MEDICARE

## 2024-04-14 DIAGNOSIS — Z95.0 PRESENCE OF CARDIAC PACEMAKER: ICD-10-CM

## 2024-04-14 PROCEDURE — 93294 REM INTERROG EVL PM/LDLS PM: CPT | Mod: ,,, | Performed by: INTERNAL MEDICINE

## 2024-04-14 PROCEDURE — 93296 REM INTERROG EVL PM/IDS: CPT | Performed by: INTERNAL MEDICINE

## 2024-04-23 LAB
OHS CV AF BURDEN PERCENT: < 1
OHS CV BIV PACING PERCENT: 0 %
OHS CV DC REMOTE DEVICE TYPE: NORMAL
OHS CV ICD SHOCK: NO
OHS CV RV PACING PERCENT: 0.74 %

## 2024-06-06 ENCOUNTER — TELEPHONE (OUTPATIENT)
Dept: CARDIOLOGY | Facility: CLINIC | Age: 89
End: 2024-06-06
Payer: MEDICARE

## 2024-06-06 DIAGNOSIS — I50.32 CHRONIC DIASTOLIC CONGESTIVE HEART FAILURE: Primary | Chronic | ICD-10-CM

## 2024-06-06 NOTE — TELEPHONE ENCOUNTER
Patient with history of hyponatremia on diuretics.  Would avoid.  Will try SG LT 2 inhibitor therapy, Jardiance 10 mg tablets, 1/2 tablet daily, check BMP in 1 week.          ----- Message from Eunice Mei MA sent at 6/6/2024  9:40 AM CDT -----  Please advise  ----- Message -----  From: Iza Bruce MA  Sent: 6/6/2024   9:39 AM CDT  To: [facility] Staff    The  patient  daughter Yoselin would like to talk  to you about her ankle and wrist are swollen please call  [phone]. Thank you

## 2024-06-12 DIAGNOSIS — E78.00 PURE HYPERCHOLESTEROLEMIA: Chronic | ICD-10-CM

## 2024-06-12 RX ORDER — ATORVASTATIN CALCIUM 40 MG/1
40 TABLET, FILM COATED ORAL
Qty: 90 TABLET | Refills: 3 | Status: SHIPPED | OUTPATIENT
Start: 2024-06-12

## 2024-06-13 ENCOUNTER — LAB VISIT (OUTPATIENT)
Dept: LAB | Facility: HOSPITAL | Age: 89
End: 2024-06-13
Attending: INTERNAL MEDICINE
Payer: MEDICARE

## 2024-06-13 ENCOUNTER — TELEPHONE (OUTPATIENT)
Dept: CARDIOLOGY | Facility: CLINIC | Age: 89
End: 2024-06-13
Payer: MEDICARE

## 2024-06-13 DIAGNOSIS — I50.32 CHRONIC DIASTOLIC CONGESTIVE HEART FAILURE: Chronic | ICD-10-CM

## 2024-06-13 LAB
ANION GAP SERPL CALC-SCNC: 10 MMOL/L (ref 8–16)
BUN SERPL-MCNC: 19 MG/DL (ref 8–23)
CALCIUM SERPL-MCNC: 9.8 MG/DL (ref 8.7–10.5)
CHLORIDE SERPL-SCNC: 102 MMOL/L (ref 95–110)
CO2 SERPL-SCNC: 21 MMOL/L (ref 23–29)
CREAT SERPL-MCNC: 1.1 MG/DL (ref 0.5–1.4)
EST. GFR  (NO RACE VARIABLE): 48.6 ML/MIN/1.73 M^2
GLUCOSE SERPL-MCNC: 89 MG/DL (ref 70–110)
POTASSIUM SERPL-SCNC: 4.2 MMOL/L (ref 3.5–5.1)
SODIUM SERPL-SCNC: 133 MMOL/L (ref 136–145)

## 2024-06-13 PROCEDURE — 80048 BASIC METABOLIC PNL TOTAL CA: CPT | Performed by: INTERNAL MEDICINE

## 2024-06-13 PROCEDURE — 36415 COLL VENOUS BLD VENIPUNCTURE: CPT | Performed by: INTERNAL MEDICINE

## 2024-06-13 NOTE — TELEPHONE ENCOUNTER
----- Message from Patrick Subramanian Jr., MD sent at 6/13/2024  4:36 PM CDT -----  Sodium 133, relatively stable.  Renal function otherwise okay.  Still swollen.  Increase Jardiance from 5-10 mg daily.  BMP in 1 week.

## 2024-06-13 NOTE — PROGRESS NOTES
Sodium 133, relatively stable.  Renal function otherwise okay.  Still swollen.  Increase Jardiance from 5-10 mg daily.  BMP in 1 week.

## 2024-06-13 NOTE — TELEPHONE ENCOUNTER
Called pt, spoke with daughter. Discussed lab results and scheduled labs in 1 week. Patient to increase Jardiance to 10mg per Dr. Subramanian. Patient verbalized understanding

## 2024-06-24 ENCOUNTER — TELEPHONE (OUTPATIENT)
Dept: CARDIOLOGY | Facility: CLINIC | Age: 89
End: 2024-06-24
Payer: MEDICARE

## 2024-06-24 ENCOUNTER — LAB VISIT (OUTPATIENT)
Dept: LAB | Facility: HOSPITAL | Age: 89
End: 2024-06-24
Attending: INTERNAL MEDICINE
Payer: MEDICARE

## 2024-06-24 DIAGNOSIS — I50.32 CHRONIC DIASTOLIC CONGESTIVE HEART FAILURE: Chronic | ICD-10-CM

## 2024-06-24 LAB
ANION GAP SERPL CALC-SCNC: 9 MMOL/L (ref 8–16)
BUN SERPL-MCNC: 18 MG/DL (ref 8–23)
CALCIUM SERPL-MCNC: 10.1 MG/DL (ref 8.7–10.5)
CHLORIDE SERPL-SCNC: 103 MMOL/L (ref 95–110)
CO2 SERPL-SCNC: 24 MMOL/L (ref 23–29)
CREAT SERPL-MCNC: 1.1 MG/DL (ref 0.5–1.4)
EST. GFR  (NO RACE VARIABLE): 48.6 ML/MIN/1.73 M^2
GLUCOSE SERPL-MCNC: 99 MG/DL (ref 70–110)
POTASSIUM SERPL-SCNC: 4.8 MMOL/L (ref 3.5–5.1)
SODIUM SERPL-SCNC: 136 MMOL/L (ref 136–145)

## 2024-06-24 PROCEDURE — 80048 BASIC METABOLIC PNL TOTAL CA: CPT | Performed by: INTERNAL MEDICINE

## 2024-06-24 PROCEDURE — 36415 COLL VENOUS BLD VENIPUNCTURE: CPT | Performed by: INTERNAL MEDICINE

## 2024-06-24 NOTE — TELEPHONE ENCOUNTER
----- Message from Patrick Subramanian Jr., MD sent at 6/24/2024  3:46 PM CDT -----  Please call, lab looks good, sodium is actually improved from previous.  How is her swelling?

## 2024-06-27 ENCOUNTER — TELEPHONE (OUTPATIENT)
Dept: CARDIOLOGY | Facility: CLINIC | Age: 89
End: 2024-06-27
Payer: MEDICARE

## 2024-06-27 DIAGNOSIS — I50.32 CHRONIC DIASTOLIC CONGESTIVE HEART FAILURE: Chronic | ICD-10-CM

## 2024-07-14 ENCOUNTER — CLINICAL SUPPORT (OUTPATIENT)
Dept: CARDIOLOGY | Facility: HOSPITAL | Age: 89
End: 2024-07-14
Attending: INTERNAL MEDICINE
Payer: MEDICARE

## 2024-07-14 ENCOUNTER — CLINICAL SUPPORT (OUTPATIENT)
Dept: CARDIOLOGY | Facility: HOSPITAL | Age: 89
End: 2024-07-14
Payer: MEDICARE

## 2024-07-14 DIAGNOSIS — Z95.0 PRESENCE OF CARDIAC PACEMAKER: ICD-10-CM

## 2024-07-14 PROCEDURE — 93296 REM INTERROG EVL PM/IDS: CPT | Performed by: INTERNAL MEDICINE

## 2024-07-14 PROCEDURE — 93294 REM INTERROG EVL PM/LDLS PM: CPT | Mod: ,,, | Performed by: INTERNAL MEDICINE

## 2024-07-22 ENCOUNTER — TELEPHONE (OUTPATIENT)
Dept: CARDIOLOGY | Facility: CLINIC | Age: 89
End: 2024-07-22
Payer: MEDICARE

## 2024-07-22 NOTE — TELEPHONE ENCOUNTER
----- Message from Amy Fernandez sent at 7/22/2024  8:41 AM CDT -----  Regarding: Pt's Daughter Eda called to speak to the nurse regarding the pt's medical device due to pt being moved to an assisted living facility and she would like a call back this morning to see if the device is still being monitored  Patient Advice:    Pt's Daughter Eda called to speak to the nurse regarding the pt's medical device due to pt being moved to an assisted living facility and she would like a call back this morning to see if the device is still being monitored    Ms Veras can be reached at [phone]

## 2024-08-12 LAB
OHS CV AF BURDEN PERCENT: < 1
OHS CV BIV PACING PERCENT: 0 %
OHS CV DC REMOTE DEVICE TYPE: NORMAL
OHS CV ICD SHOCK: NO
OHS CV RV PACING PERCENT: 0.41 %

## 2024-09-17 ENCOUNTER — OFFICE VISIT (OUTPATIENT)
Dept: CARDIOLOGY | Facility: CLINIC | Age: 89
End: 2024-09-17
Payer: MEDICARE

## 2024-09-17 VITALS
DIASTOLIC BLOOD PRESSURE: 71 MMHG | BODY MASS INDEX: 26.45 KG/M2 | WEIGHT: 140 LBS | HEART RATE: 69 BPM | SYSTOLIC BLOOD PRESSURE: 176 MMHG

## 2024-09-17 DIAGNOSIS — I25.10 CORONARY ARTERY DISEASE INVOLVING NATIVE CORONARY ARTERY OF NATIVE HEART WITHOUT ANGINA PECTORIS: Chronic | ICD-10-CM

## 2024-09-17 DIAGNOSIS — E78.00 PURE HYPERCHOLESTEROLEMIA: Chronic | ICD-10-CM

## 2024-09-17 DIAGNOSIS — Z95.3 HISTORY OF AORTIC VALVE REPLACEMENT WITH BIOPROSTHETIC VALVE: Chronic | ICD-10-CM

## 2024-09-17 DIAGNOSIS — I10 PRIMARY HYPERTENSION: Chronic | ICD-10-CM

## 2024-09-17 DIAGNOSIS — Z95.0 CARDIAC RESYNCHRONIZATION THERAPY PACEMAKER (CRT-P) IN PLACE: Chronic | ICD-10-CM

## 2024-09-17 DIAGNOSIS — I50.32 CHRONIC DIASTOLIC CONGESTIVE HEART FAILURE: Primary | Chronic | ICD-10-CM

## 2024-09-17 DIAGNOSIS — I35.0 AORTIC STENOSIS, SEVERE: Chronic | ICD-10-CM

## 2024-09-17 DIAGNOSIS — Z95.1 STATUS POST CORONARY ARTERY BYPASS GRAFTING: Chronic | ICD-10-CM

## 2024-09-17 PROCEDURE — 1126F AMNT PAIN NOTED NONE PRSNT: CPT | Mod: CPTII,S$GLB,, | Performed by: INTERNAL MEDICINE

## 2024-09-17 PROCEDURE — 99214 OFFICE O/P EST MOD 30 MIN: CPT | Mod: S$GLB,,, | Performed by: INTERNAL MEDICINE

## 2024-09-17 PROCEDURE — 1101F PT FALLS ASSESS-DOCD LE1/YR: CPT | Mod: CPTII,S$GLB,, | Performed by: INTERNAL MEDICINE

## 2024-09-17 PROCEDURE — 99999 PR PBB SHADOW E&M-EST. PATIENT-LVL III: CPT | Mod: PBBFAC,,, | Performed by: INTERNAL MEDICINE

## 2024-09-17 PROCEDURE — 1159F MED LIST DOCD IN RCRD: CPT | Mod: CPTII,S$GLB,, | Performed by: INTERNAL MEDICINE

## 2024-09-17 PROCEDURE — 3288F FALL RISK ASSESSMENT DOCD: CPT | Mod: CPTII,S$GLB,, | Performed by: INTERNAL MEDICINE
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
## 2024-10-13 ENCOUNTER — CLINICAL SUPPORT (OUTPATIENT)
Dept: CARDIOLOGY | Facility: HOSPITAL | Age: 89
End: 2024-10-13
Payer: MEDICARE

## 2024-10-13 ENCOUNTER — CLINICAL SUPPORT (OUTPATIENT)
Dept: CARDIOLOGY | Facility: HOSPITAL | Age: 89
End: 2024-10-13
Attending: INTERNAL MEDICINE
Payer: MEDICARE

## 2024-10-13 DIAGNOSIS — Z95.0 PRESENCE OF CARDIAC PACEMAKER: ICD-10-CM

## 2024-10-13 PROCEDURE — 93296 REM INTERROG EVL PM/IDS: CPT | Performed by: INTERNAL MEDICINE

## 2024-10-13 PROCEDURE — 93294 REM INTERROG EVL PM/LDLS PM: CPT | Mod: ,,, | Performed by: INTERNAL MEDICINE

## 2024-10-30 LAB
OHS CV AF BURDEN PERCENT: < 1
OHS CV BIV PACING PERCENT: 0 %
OHS CV DC REMOTE DEVICE TYPE: NORMAL
OHS CV ICD SHOCK: NO
OHS CV RV PACING PERCENT: 0.87 %

## 2024-12-11 ENCOUNTER — TELEPHONE (OUTPATIENT)
Dept: CARDIOLOGY | Facility: CLINIC | Age: 89
End: 2024-12-11
Payer: MEDICARE

## 2024-12-11 DIAGNOSIS — I44.2 COMPLETE HEART BLOCK: Primary | ICD-10-CM

## 2024-12-31 ENCOUNTER — TELEPHONE (OUTPATIENT)
Dept: CARDIOLOGY | Facility: CLINIC | Age: 89
End: 2024-12-31
Payer: MEDICARE

## 2025-01-07 ENCOUNTER — TELEPHONE (OUTPATIENT)
Dept: CARDIOLOGY | Facility: HOSPITAL | Age: 89
End: 2025-01-07
Payer: MEDICARE

## 2025-01-08 ENCOUNTER — CLINICAL SUPPORT (OUTPATIENT)
Dept: CARDIOLOGY | Facility: HOSPITAL | Age: 89
End: 2025-01-08
Attending: INTERNAL MEDICINE
Payer: MEDICARE

## 2025-01-08 DIAGNOSIS — I44.2 COMPLETE HEART BLOCK: ICD-10-CM

## 2025-01-08 PROCEDURE — 93280 PM DEVICE PROGR EVAL DUAL: CPT | Mod: 26,,, | Performed by: INTERNAL MEDICINE

## 2025-01-08 PROCEDURE — 93280 PM DEVICE PROGR EVAL DUAL: CPT

## 2025-01-10 LAB
OHS CV AF BURDEN PERCENT: < 1
OHS CV DC REMOTE DEVICE TYPE: NORMAL
OHS CV RV PACING PERCENT: 0.8 %

## 2025-01-12 ENCOUNTER — CLINICAL SUPPORT (OUTPATIENT)
Dept: CARDIOLOGY | Facility: HOSPITAL | Age: 89
End: 2025-01-12
Attending: INTERNAL MEDICINE
Payer: MEDICARE

## 2025-01-12 DIAGNOSIS — Z95.0 PRESENCE OF CARDIAC PACEMAKER: ICD-10-CM

## 2025-01-12 PROCEDURE — 93296 REM INTERROG EVL PM/IDS: CPT | Performed by: INTERNAL MEDICINE

## 2025-01-12 PROCEDURE — 93294 REM INTERROG EVL PM/LDLS PM: CPT | Mod: ,,, | Performed by: INTERNAL MEDICINE

## 2025-01-28 LAB
OHS CV AF BURDEN PERCENT: < 1
OHS CV BIV PACING PERCENT: 0 %
OHS CV DC REMOTE DEVICE TYPE: NORMAL
OHS CV ICD SHOCK: NO
OHS CV RV PACING PERCENT: 1.05 %

## 2025-01-30 ENCOUNTER — TELEPHONE (OUTPATIENT)
Dept: CARDIOLOGY | Facility: CLINIC | Age: 89
End: 2025-01-30
Payer: MEDICARE

## 2025-01-30 NOTE — TELEPHONE ENCOUNTER
----- Message from Keira sent at 1/30/2025  1:11 PM CST -----  Regarding: Elevated pressure  Jennifer [phone] pt daughter says her pressure has been elevated the last 3 weeks with swelling in both feet, but mostly in her left.    Thanks

## 2025-02-03 ENCOUNTER — OFFICE VISIT (OUTPATIENT)
Dept: CARDIOLOGY | Facility: CLINIC | Age: 89
End: 2025-02-03
Payer: MEDICARE

## 2025-02-03 VITALS
DIASTOLIC BLOOD PRESSURE: 73 MMHG | HEART RATE: 75 BPM | SYSTOLIC BLOOD PRESSURE: 172 MMHG | BODY MASS INDEX: 27.87 KG/M2 | WEIGHT: 147.5 LBS

## 2025-02-03 DIAGNOSIS — E78.00 PURE HYPERCHOLESTEROLEMIA: Chronic | ICD-10-CM

## 2025-02-03 DIAGNOSIS — I50.32 CHRONIC DIASTOLIC CONGESTIVE HEART FAILURE: ICD-10-CM

## 2025-02-03 DIAGNOSIS — Z95.3 HISTORY OF AORTIC VALVE REPLACEMENT WITH BIOPROSTHETIC VALVE: Chronic | ICD-10-CM

## 2025-02-03 DIAGNOSIS — Z95.0 CARDIAC RESYNCHRONIZATION THERAPY PACEMAKER (CRT-P) IN PLACE: Chronic | ICD-10-CM

## 2025-02-03 DIAGNOSIS — I10 PRIMARY HYPERTENSION: Primary | Chronic | ICD-10-CM

## 2025-02-03 DIAGNOSIS — I44.2 CHB (COMPLETE HEART BLOCK): ICD-10-CM

## 2025-02-03 PROCEDURE — 3288F FALL RISK ASSESSMENT DOCD: CPT | Mod: CPTII,S$GLB,, | Performed by: INTERNAL MEDICINE

## 2025-02-03 PROCEDURE — 1159F MED LIST DOCD IN RCRD: CPT | Mod: CPTII,S$GLB,, | Performed by: INTERNAL MEDICINE

## 2025-02-03 PROCEDURE — 1126F AMNT PAIN NOTED NONE PRSNT: CPT | Mod: CPTII,S$GLB,, | Performed by: INTERNAL MEDICINE

## 2025-02-03 PROCEDURE — 99214 OFFICE O/P EST MOD 30 MIN: CPT | Mod: S$GLB,,, | Performed by: INTERNAL MEDICINE

## 2025-02-03 PROCEDURE — 99999 PR PBB SHADOW E&M-EST. PATIENT-LVL III: CPT | Mod: PBBFAC,,, | Performed by: INTERNAL MEDICINE

## 2025-02-03 PROCEDURE — 1101F PT FALLS ASSESS-DOCD LE1/YR: CPT | Mod: CPTII,S$GLB,, | Performed by: INTERNAL MEDICINE

## 2025-02-03 RX ORDER — MIRTAZAPINE 7.5 MG/1
TABLET, FILM COATED ORAL
COMMUNITY
Start: 2024-11-19

## 2025-02-03 RX ORDER — HYDRALAZINE HYDROCHLORIDE 25 MG/1
25 TABLET, FILM COATED ORAL EVERY 8 HOURS
Qty: 90 TABLET | Refills: 11 | Status: SHIPPED | OUTPATIENT
Start: 2025-02-03 | End: 2026-02-03

## 2025-02-03 RX ORDER — DONEPEZIL HYDROCHLORIDE 5 MG/1
TABLET, FILM COATED ORAL
COMMUNITY
Start: 2025-01-11

## 2025-02-03 RX ORDER — CETIRIZINE HYDROCHLORIDE 10 MG/1
1 TABLET, CHEWABLE ORAL DAILY
COMMUNITY

## 2025-02-03 RX ORDER — CLOPIDOGREL BISULFATE 75 MG/1
1 TABLET ORAL DAILY
COMMUNITY
Start: 2024-11-19
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
## 2025-03-18 ENCOUNTER — HOSPITAL ENCOUNTER (OUTPATIENT)
Dept: CARDIOLOGY | Facility: HOSPITAL | Age: 89
Discharge: HOME | End: 2025-03-18
Attending: INTERNAL MEDICINE
Payer: MEDICARE

## 2025-03-18 VITALS
WEIGHT: 147 LBS | HEART RATE: 64 BPM | BODY MASS INDEX: 27.75 KG/M2 | DIASTOLIC BLOOD PRESSURE: 80 MMHG | HEIGHT: 61 IN | SYSTOLIC BLOOD PRESSURE: 132 MMHG

## 2025-03-18 DIAGNOSIS — I50.32 CHRONIC DIASTOLIC CONGESTIVE HEART FAILURE: Chronic | ICD-10-CM

## 2025-03-18 DIAGNOSIS — Z95.3 HISTORY OF AORTIC VALVE REPLACEMENT WITH BIOPROSTHETIC VALVE: Chronic | ICD-10-CM

## 2025-03-18 DIAGNOSIS — I35.0 AORTIC STENOSIS, SEVERE: Chronic | ICD-10-CM

## 2025-03-18 LAB
ASCENDING AORTA: 2.82 CM
AV AREA BY CONTINUOUS VTI: 2.8 CM2
AV INDEX (PROSTH): 0.87
AV LVOT MEAN GRADIENT: 4 MMHG
AV LVOT PEAK GRADIENT: 8 MMHG
AV MEAN GRADIENT: 6 MMHG
AV PEAK GRADIENT: 10 MMHG
AV VALVE AREA BY VELOCITY RATIO: 2.7 CM²
AV VALVE AREA: 2.7 CM2
AV VELOCITY RATIO: 0.88
BSA FOR ECHO PROCEDURE: 1.69 M2
CV ECHO LV RWT: 0.33 CM
DOP CALC AO PEAK VEL: 1.6 M/S
DOP CALC AO VTI: 32.4 CM
DOP CALC LVOT AREA: 3.1 CM2
DOP CALC LVOT DIAMETER: 2 CM
DOP CALC LVOT PEAK VEL: 1.4 M/S
DOP CALC LVOT STROKE VOLUME: 88.2 CM3
DOP CALCLVOT PEAK VEL VTI: 28.1 CM
E WAVE DECELERATION TIME: 118 MS
E/A RATIO: 0.85
E/E' RATIO: 17 M/S
ECHO EF ESTIMATED: 75 %
ECHO LV POSTERIOR WALL: 0.7 CM (ref 0.6–1.1)
FRACTIONAL SHORTENING: 44.2 % (ref 28–44)
HR MV ECHO: 64 BPM
INTERVENTRICULAR SEPTUM: 0.9 CM (ref 0.6–1.1)
IVC DIAMETER: 1.14 CM
LA MAJOR: 4.7 CM
LA MINOR: 4.6 CM
LA WIDTH: 3.6 CM
LEFT ATRIUM SIZE: 3.3 CM
LEFT ATRIUM VOLUME INDEX: 28 ML/M2
LEFT ATRIUM VOLUME: 47 CM3
LEFT INTERNAL DIMENSION IN SYSTOLE: 2.4 CM (ref 2.1–4)
LEFT VENTRICLE DIASTOLIC VOLUME INDEX: 50.6 ML/M2
LEFT VENTRICLE DIASTOLIC VOLUME: 84 ML
LEFT VENTRICLE MASS INDEX: 63.4 G/M2
LEFT VENTRICLE SYSTOLIC VOLUME INDEX: 12.7 ML/M2
LEFT VENTRICLE SYSTOLIC VOLUME: 21 ML
LEFT VENTRICULAR INTERNAL DIMENSION IN DIASTOLE: 4.3 CM (ref 3.5–6)
LEFT VENTRICULAR MASS: 105.3 G
LV LATERAL E/E' RATIO: 12.5
LV SEPTAL E/E' RATIO: 25
MV A" WAVE DURATION": 91.34 MS
MV MEAN GRADIENT: 2 MMHG
MV PEAK A VEL: 1.17 M/S
MV PEAK E VEL: 1 M/S
MV PEAK GRADIENT: 4 MMHG
OHS CV RV/LV RATIO: 0.56 CM
PISA TR MAX VEL: 2.7 M/S
PULM VEIN A" WAVE DURATION": 91.34 MS
PULM VEIN S/D RATIO: 1.08
PULMONIC VEIN PEAK A VELOCITY: 0.3 M/S
PV PEAK D VEL: 0.37 M/S
PV PEAK S VEL: 0.4 M/S
RA MAJOR: 3.85 CM
RA PRESSURE ESTIMATED: 3 MMHG
RA WIDTH: 2.48 CM
RIGHT ATRIAL AREA: 9.4 CM2
RIGHT VENTRICLE DIASTOLIC BASEL DIMENSION: 2.4 CM
RV TB RVSP: 6 MMHG
RV TISSUE DOPPLER FREE WALL SYSTOLIC VELOCITY 1 (APICAL 4 CHAMBER VIEW): 6.37 CM/S
SINUS: 2.79 CM
STJ: 2.77 CM
TDI LATERAL: 0.08 M/S
TDI SEPTAL: 0.04 M/S
TDI: 0.06 M/S
TRICUSPID ANNULAR PLANE SYSTOLIC EXCURSION: 1.19 CM
TV PEAK GRADIENT: 29 MMHG
TV REST PULMONARY ARTERY PRESSURE: 32 MMHG
Z-SCORE OF LEFT VENTRICULAR DIMENSION IN END DIASTOLE: -0.78
Z-SCORE OF LEFT VENTRICULAR DIMENSION IN END SYSTOLE: -1.45

## 2025-03-18 PROCEDURE — 93306 TTE W/DOPPLER COMPLETE: CPT | Mod: 26,,, | Performed by: INTERNAL MEDICINE

## 2025-03-18 PROCEDURE — 93306 TTE W/DOPPLER COMPLETE: CPT

## 2025-03-24 ENCOUNTER — OFFICE VISIT (OUTPATIENT)
Dept: CARDIOLOGY | Facility: CLINIC | Age: 89
End: 2025-03-24
Payer: MEDICARE

## 2025-03-24 VITALS
HEIGHT: 61 IN | SYSTOLIC BLOOD PRESSURE: 164 MMHG | WEIGHT: 145.5 LBS | HEART RATE: 71 BPM | BODY MASS INDEX: 27.47 KG/M2 | DIASTOLIC BLOOD PRESSURE: 77 MMHG

## 2025-03-24 DIAGNOSIS — Z95.1 STATUS POST CORONARY ARTERY BYPASS GRAFTING: Chronic | ICD-10-CM

## 2025-03-24 DIAGNOSIS — E78.2 MIXED HYPERLIPIDEMIA: ICD-10-CM

## 2025-03-24 DIAGNOSIS — I25.10 CORONARY ARTERY DISEASE INVOLVING NATIVE CORONARY ARTERY OF NATIVE HEART WITHOUT ANGINA PECTORIS: Chronic | ICD-10-CM

## 2025-03-24 DIAGNOSIS — I10 PRIMARY HYPERTENSION: Primary | Chronic | ICD-10-CM

## 2025-03-24 DIAGNOSIS — Z95.3 HISTORY OF AORTIC VALVE REPLACEMENT WITH BIOPROSTHETIC VALVE: Chronic | ICD-10-CM

## 2025-03-24 DIAGNOSIS — Z95.0 CARDIAC RESYNCHRONIZATION THERAPY PACEMAKER (CRT-P) IN PLACE: Chronic | ICD-10-CM

## 2025-03-24 DIAGNOSIS — I50.32 CHRONIC DIASTOLIC CONGESTIVE HEART FAILURE: Chronic | ICD-10-CM

## 2025-03-24 PROCEDURE — 1159F MED LIST DOCD IN RCRD: CPT | Mod: CPTII,S$GLB,, | Performed by: INTERNAL MEDICINE

## 2025-03-24 PROCEDURE — 1101F PT FALLS ASSESS-DOCD LE1/YR: CPT | Mod: CPTII,S$GLB,, | Performed by: INTERNAL MEDICINE

## 2025-03-24 PROCEDURE — 1126F AMNT PAIN NOTED NONE PRSNT: CPT | Mod: CPTII,S$GLB,, | Performed by: INTERNAL MEDICINE

## 2025-03-24 PROCEDURE — 99999 PR PBB SHADOW E&M-EST. PATIENT-LVL III: CPT | Mod: PBBFAC,,, | Performed by: INTERNAL MEDICINE

## 2025-03-24 PROCEDURE — 3288F FALL RISK ASSESSMENT DOCD: CPT | Mod: CPTII,S$GLB,, | Performed by: INTERNAL MEDICINE

## 2025-03-24 PROCEDURE — 99214 OFFICE O/P EST MOD 30 MIN: CPT | Mod: S$GLB,,, | Performed by: INTERNAL MEDICINE

## 2025-03-24 RX ORDER — AMLODIPINE BESYLATE 2.5 MG/1
2.5 TABLET ORAL NIGHTLY
Qty: 30 TABLET | Refills: 11 | Status: SHIPPED | OUTPATIENT
Start: 2025-03-24 | End: 2026-03-24
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
## 2025-04-13 ENCOUNTER — CLINICAL SUPPORT (OUTPATIENT)
Dept: CARDIOLOGY | Facility: HOSPITAL | Age: 89
End: 2025-04-13
Payer: MEDICARE

## 2025-04-13 ENCOUNTER — CLINICAL SUPPORT (OUTPATIENT)
Dept: CARDIOLOGY | Facility: HOSPITAL | Age: 89
End: 2025-04-13
Attending: INTERNAL MEDICINE
Payer: MEDICARE

## 2025-04-13 DIAGNOSIS — I50.32 CHRONIC DIASTOLIC (CONGESTIVE) HEART FAILURE: ICD-10-CM

## 2025-04-13 DIAGNOSIS — Z95.0 PRESENCE OF CARDIAC PACEMAKER: ICD-10-CM

## 2025-04-13 PROCEDURE — 93296 REM INTERROG EVL PM/IDS: CPT | Performed by: INTERNAL MEDICINE

## 2025-05-07 LAB
OHS CV AF BURDEN PERCENT: < 1
OHS CV BIV PACING PERCENT: 0 %
OHS CV DC REMOTE DEVICE TYPE: NORMAL
OHS CV ICD SHOCK: NO
OHS CV RV PACING PERCENT: 57.7 %

## 2025-06-24 ENCOUNTER — OFFICE VISIT (OUTPATIENT)
Dept: CARDIOLOGY | Facility: CLINIC | Age: 89
End: 2025-06-24
Payer: MEDICARE

## 2025-06-24 VITALS
SYSTOLIC BLOOD PRESSURE: 175 MMHG | WEIGHT: 138.25 LBS | BODY MASS INDEX: 25.44 KG/M2 | HEIGHT: 62 IN | DIASTOLIC BLOOD PRESSURE: 77 MMHG | HEART RATE: 67 BPM

## 2025-06-24 DIAGNOSIS — Z95.1 STATUS POST CORONARY ARTERY BYPASS GRAFTING: Chronic | ICD-10-CM

## 2025-06-24 DIAGNOSIS — I44.2 CHB (COMPLETE HEART BLOCK): Chronic | ICD-10-CM

## 2025-06-24 DIAGNOSIS — Z95.3 HISTORY OF AORTIC VALVE REPLACEMENT WITH BIOPROSTHETIC VALVE: Chronic | ICD-10-CM

## 2025-06-24 DIAGNOSIS — I25.10 CORONARY ARTERY DISEASE INVOLVING NATIVE CORONARY ARTERY OF NATIVE HEART WITHOUT ANGINA PECTORIS: Chronic | ICD-10-CM

## 2025-06-24 DIAGNOSIS — E78.2 MIXED HYPERLIPIDEMIA: ICD-10-CM

## 2025-06-24 DIAGNOSIS — Z95.0 CARDIAC RESYNCHRONIZATION THERAPY PACEMAKER (CRT-P) IN PLACE: Chronic | ICD-10-CM

## 2025-06-24 DIAGNOSIS — I50.32 CHRONIC DIASTOLIC CONGESTIVE HEART FAILURE: Chronic | ICD-10-CM

## 2025-06-24 DIAGNOSIS — E87.1 HYPONATREMIA: ICD-10-CM

## 2025-06-24 DIAGNOSIS — I50.32 CHRONIC DIASTOLIC (CONGESTIVE) HEART FAILURE: Primary | ICD-10-CM

## 2025-06-24 DIAGNOSIS — I10 PRIMARY HYPERTENSION: ICD-10-CM

## 2025-06-24 DIAGNOSIS — Z95.0 PRESENCE OF CARDIAC PACEMAKER: ICD-10-CM

## 2025-06-24 DIAGNOSIS — I35.0 AORTIC STENOSIS, SEVERE: Chronic | ICD-10-CM

## 2025-06-24 PROCEDURE — 99999 PR PBB SHADOW E&M-EST. PATIENT-LVL IV: CPT | Mod: PBBFAC,,, | Performed by: INTERNAL MEDICINE

## 2025-06-24 PROCEDURE — 1159F MED LIST DOCD IN RCRD: CPT | Mod: CPTII,S$GLB,, | Performed by: INTERNAL MEDICINE

## 2025-06-24 PROCEDURE — 3288F FALL RISK ASSESSMENT DOCD: CPT | Mod: CPTII,S$GLB,, | Performed by: INTERNAL MEDICINE

## 2025-06-24 PROCEDURE — 99214 OFFICE O/P EST MOD 30 MIN: CPT | Mod: S$GLB,,, | Performed by: INTERNAL MEDICINE

## 2025-06-24 PROCEDURE — 1101F PT FALLS ASSESS-DOCD LE1/YR: CPT | Mod: CPTII,S$GLB,, | Performed by: INTERNAL MEDICINE

## 2025-06-24 PROCEDURE — 1126F AMNT PAIN NOTED NONE PRSNT: CPT | Mod: CPTII,S$GLB,, | Performed by: INTERNAL MEDICINE

## 2025-06-24 RX ORDER — FUROSEMIDE 20 MG/1
20 TABLET ORAL
Qty: 12 TABLET | Refills: 11 | Status: SHIPPED | OUTPATIENT
Start: 2025-06-25

## 2025-06-24 NOTE — PROGRESS NOTES
Subjective:   06/24/2025     Patient ID:  Nelda Rawls is a 89 y.o. female who presents for evaulation of Follow-up       History of Present Illness    CHIEF COMPLAINT:  Patient presents for follow-up to discuss BP management and dyspnea.    HPI:  Patient, 89 years old, reports ongoing issues with HTN, despite multiple medications including recently added amlodipine. She has persistent dyspnea, present constantly, and increased fatigue. She has chronic lower extremity edema, consistently present, currently noted to be mild. Her labs from March showed good cholesterol levels and diabetes control, with sodium levels also reported as satisfactory.    She denies chest pain or tightness in the chest.    CARDIAC HISTORY:  EKG: shows pacemaker EKG: shows pacemaker functioning    MEDICATIONS:  Amlodipine 2.5 mg daily  Hydralazine 25 mg 3x daily  Metoprolol 25 mg twice daily  Atorvastatin 40 mg daily  Valsartan 320 mg daily  Clopidogrel  Discontinued Furosemide Patient is on Jardiance 10 mg daily, Aricept, and Mirtazapine.    TEST RESULTS:  In March, the patient's sodium levels were satisfactory, cholesterol levels were optimal, and diabetes blood test results were within the target range.    MEDICAL HISTORY:  Patient has a history of hypertension, diabetes, a heart condition requiring a pacemaker, and lower extremity edema.    SURGICAL HISTORY:  She has undergone pacemaker implantation.      ROS:  General: -fever, -chills, +fatigue, -weight gain, -weight loss  Eyes: -vision changes, -redness, -discharge  ENT: -ear pain, -nasal congestion, -sore throat  Cardiovascular: -chest pain, -palpitations, +lower extremity edema  Respiratory: -cough, +shortness of breath  Gastrointestinal: -abdominal pain, -nausea, -vomiting, -diarrhea, -constipation, -blood in stool  Genitourinary: -dysuria, -hematuria, -frequency  Musculoskeletal: -joint pain, -muscle pain  Skin: -rash, -lesion  Neurological: -headache, -dizziness, -numbness,  -tingling  Psychiatric: -anxiety, -depression, -sleep difficulty          Problem List[1]     Review of patient's allergies indicates:   Allergen Reactions    Penicillins Hives and Swelling       Current Medications[2]     Objective:   Physical Exam    General: No acute distress. Well-developed. Well-nourished.  Eyes: EOMI. Sclerae anicteric.  HENT: Normocephalic. Atraumatic. Nares patent. Moist oral mucosa.  Cardiovascular: Regular rate. Regular rhythm. No murmurs. No rubs. No gallops. Normal S1, S2.  Respiratory: Normal respiratory effort. Clear to auscultation bilaterally. No rales. No rhonchi. No wheezing.  Musculoskeletal: No  obvious deformity.  Extremities: No lower extremity edema. Mild lower extremity edema.  Neurological: Alert & oriented x3. No slurred speech. Normal gait.  Psychiatric: Normal mood. Normal affect. Good insight. Good judgment.  Skin: Warm. Dry. No rash.          Vitals:    06/24/25 1435   BP: (!) 175/77   Pulse: 67     Wt Readings from Last 3 Encounters:   06/24/25 62.7 kg (138 lb 3.7 oz)   03/24/25 66 kg (145 lb 8.1 oz)   03/18/25 66.7 kg (147 lb)     Temp Readings from Last 3 Encounters:   07/06/22 97.1 °F (36.2 °C) (Oral)     BP Readings from Last 3 Encounters:   06/24/25 (!) 175/77   03/24/25 (!) 164/77   03/18/25 132/80     Pulse Readings from Last 3 Encounters:   06/24/25 67   03/24/25 71   03/18/25 64               Lab Results   Component Value Date    CHOL 124 10/15/2023    CHOL 153 02/28/2023    CHOL 153 10/04/2022     Lab Results   Component Value Date    HDL 49 10/15/2023    HDL 57 02/28/2023    HDL 57 10/04/2022     Lab Results   Component Value Date    LDLCALC 61 10/15/2023    LDLCALC 80 02/28/2023    LDLCALC 68 10/04/2022     Lab Results   Component Value Date    ALT 12 03/26/2025    AST 19 03/26/2025    AST 20 03/17/2025    AST 20 09/30/2024     Lab Results   Component Value Date    CREATININE 1.10 (H) 03/26/2025    BUN 17 03/26/2025     03/26/2025    K 4.3 03/26/2025     CO2 25 03/26/2025    CO2 24 03/17/2025    CO2 26 09/30/2024     Lab Results   Component Value Date    HGB 11.1 (L) 07/06/2022    HCT 33.0 (L) 07/06/2022    HCT 29.8 (L) 07/05/2022    HCT 32.1 (L) 07/04/2022       Assessment and Plan:   Assessment & Plan    I50.32 Chronic diastolic (congestive) heart failure  I44.2 CHB (complete heart block)  Z95.0 Presence of cardiac pacemaker  I10 Primary hypertension  I35.0 Aortic stenosis, severe  Z95.3 History of aortic valve replacement with bioprosthetic valve  I25.10 Coronary artery disease involving native coronary artery of native heart without angina pectoris  E78.2 Mixed hyperlipidemia  Z95.1 Status post coronary artery bypass grafting  E87.1 Hyponatremia    IMPRESSION:  - Noted elevated blood pressure readings, though improved from previous visit.  - Observed mild LLE edema.    CHRONIC DIASTOLIC (CONGESTIVE) HEART FAILURE:  - Started furosemide 20 mg to be taken every Monday, Wednesday, and Friday to manage fluid retention while monitoring sodium levels.  - Patient to maintain current fluid intake as directed by care team.    HYPONATREMIA:  - Ordered blood test to check sodium levels in 1 week and again in 2 weeks.  - Follow up after completion of labs.          No follow-ups on file.        Future Appointments   Date Time Provider Department Center   7/3/2025  9:00 AM ROBERT, [facility]       This note was generated with the assistance of ambient listening technology. Verbal consent was obtained by the patient and accompanying visitor(s) for the recording of patient appointment to facilitate this note. I attest to having reviewed and edited the generated note for accuracy, though some syntax or spelling errors may persist. Please contact the author of this note for any clarification.                      [1]   Patient Active Problem List  Diagnosis    Aortic stenosis, severe    Cardiac resynchronization therapy 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    CHB  (complete heart block)    Chronic diastolic congestive heart failure    Stage 3a chronic kidney disease    Coronary artery disease involving native coronary artery of native heart without angina pectoris    Mixed hyperlipidemia    Osteoporosis    Primary hypertension    Peripheral vascular disease    History of aortic valve replacement with bioprosthetic valve    Status post coronary artery bypass grafting    Syncope and collapse    Hyponatremia    Anemia    Left maxillary sinusitis    Acute on chronic systolic congestive heart failure   [2]   Current Outpatient Medications:     acetaminophen (TYLENOL) 325 MG tablet, Take 325 mg by mouth daily as needed for Pain., Disp: , Rfl:     amLODIPine (NORVASC) 2.5 MG tablet, Take 1 tablet (2.5 mg total) by mouth every evening., Disp: 30 tablet, Rfl: 11    atorvastatin (LIPITOR) 40 MG tablet, TAKE 1 TABLET(40 MG) BY MOUTH EVERY DAY, Disp: 90 tablet, Rfl: 3    busPIRone (BUSPAR) 15 MG tablet, Take 15 mg by mouth once daily., Disp: , Rfl:     calcium carbonate (TUMS) 200 mg calcium (500 mg) chewable tablet, Take 2 tablets by mouth 2 (two) times daily as needed for Heartburn., Disp: , Rfl:     cetirizine 10 mg chewable tablet, Take 1 tablet by mouth once daily., Disp: , Rfl:     clopidogreL (PLAVIX) 75 mg tablet, Take 1 tablet by mouth once daily., Disp: , Rfl:     donepeziL (ARICEPT) 5 MG tablet, , Disp: , Rfl:     empagliflozin (JARDIANCE) 10 mg tablet, 1 tablet daily, Disp: 30 tablet, Rfl: 11    hydrALAZINE (APRESOLINE) 25 MG tablet, Take 1 tablet (25 mg total) by mouth every 8 (eight) hours., Disp: 90 tablet, Rfl: 11    metoprolol succinate (TOPROL-XL) 25 MG 24 hr tablet, TAKE 1 TABLET(25 MG) BY MOUTH EVERY DAY, Disp: 90 tablet, Rfl: 3    mirtazapine (REMERON) 7.5 MG Tab, TAKE 1 TABLET BY MOUTH AT BEDTIME, BUT PATIENT MAY REFUSE., Disp: , Rfl:     multivitamin (THERAGRAN) per tablet, Take 1 tablet by mouth once daily., Disp: , Rfl:     oxymetazoline (AFRIN) 0.05 % nasal  spray, 1-2 sprays by Nasal route daily as needed for Congestion., Disp: , Rfl:     [START ON 6/25/2025] furosemide (LASIX) 20 MG tablet, Take 1 tablet (20 mg total) by mouth every Mon, Wed, Fri., Disp: 12 tablet, Rfl: 11    valsartan (DIOVAN) 320 MG tablet, Take 1 tablet (320 mg total) by mouth once daily., Disp: 90 tablet, Rfl: 3

## 2025-06-27 LAB
OHS QRS DURATION: 128 MS
OHS QTC CALCULATION: 468 MS

## 2025-07-03 ENCOUNTER — RESULTS FOLLOW-UP (OUTPATIENT)
Dept: CARDIOLOGY | Facility: CLINIC | Age: 89
End: 2025-07-03

## 2025-07-03 ENCOUNTER — LAB VISIT (OUTPATIENT)
Dept: LAB | Facility: HOSPITAL | Age: 89
End: 2025-07-03
Attending: INTERNAL MEDICINE
Payer: MEDICARE

## 2025-07-03 DIAGNOSIS — I50.32 CHRONIC DIASTOLIC CONGESTIVE HEART FAILURE: Chronic | ICD-10-CM

## 2025-07-03 LAB
ANION GAP (OHS): 10 MMOL/L (ref 8–16)
BUN SERPL-MCNC: 17 MG/DL (ref 8–23)
CALCIUM SERPL-MCNC: 9.9 MG/DL (ref 8.7–10.5)
CHLORIDE SERPL-SCNC: 104 MMOL/L (ref 95–110)
CO2 SERPL-SCNC: 23 MMOL/L (ref 23–29)
CREAT SERPL-MCNC: 1.1 MG/DL (ref 0.5–1.4)
GFR SERPLBLD CREATININE-BSD FMLA CKD-EPI: 48 ML/MIN/1.73/M2
GLUCOSE SERPL-MCNC: 95 MG/DL (ref 70–110)
POTASSIUM SERPL-SCNC: 3.9 MMOL/L (ref 3.5–5.1)
SODIUM SERPL-SCNC: 137 MMOL/L (ref 136–145)

## 2025-07-03 PROCEDURE — 36415 COLL VENOUS BLD VENIPUNCTURE: CPT | Mod: PO

## 2025-07-03 PROCEDURE — 82310 ASSAY OF CALCIUM: CPT

## 2025-07-13 ENCOUNTER — CLINICAL SUPPORT (OUTPATIENT)
Dept: CARDIOLOGY | Facility: HOSPITAL | Age: 89
End: 2025-07-13
Attending: INTERNAL MEDICINE
Payer: MEDICARE

## 2025-07-13 ENCOUNTER — CLINICAL SUPPORT (OUTPATIENT)
Dept: CARDIOLOGY | Facility: HOSPITAL | Age: 89
End: 2025-07-13
Payer: MEDICARE

## 2025-07-13 DIAGNOSIS — Z95.0 PRESENCE OF CARDIAC PACEMAKER: ICD-10-CM

## 2025-07-13 DIAGNOSIS — I50.32 CHRONIC DIASTOLIC (CONGESTIVE) HEART FAILURE: ICD-10-CM

## 2025-07-13 PROCEDURE — 93294 REM INTERROG EVL PM/LDLS PM: CPT | Mod: ,,, | Performed by: INTERNAL MEDICINE

## 2025-07-13 PROCEDURE — 93296 REM INTERROG EVL PM/IDS: CPT | Performed by: INTERNAL MEDICINE

## 2025-07-14 ENCOUNTER — TELEPHONE (OUTPATIENT)
Dept: CARDIOLOGY | Facility: CLINIC | Age: 89
End: 2025-07-14
Payer: MEDICARE

## 2025-07-14 NOTE — TELEPHONE ENCOUNTER
----- Message from Shanna sent at 7/14/2025  2:38 PM CDT -----  Regarding: Results  Patient calling for her results. Please call back @  [phone]. Thank you Shanna

## 2025-07-17 ENCOUNTER — LAB VISIT (OUTPATIENT)
Dept: LAB | Facility: HOSPITAL | Age: 89
End: 2025-07-17
Attending: INTERNAL MEDICINE
Payer: MEDICARE

## 2025-07-17 DIAGNOSIS — I50.32 CHRONIC DIASTOLIC CONGESTIVE HEART FAILURE: Chronic | ICD-10-CM

## 2025-07-17 LAB
ANION GAP (OHS): 10 MMOL/L (ref 8–16)
BUN SERPL-MCNC: 20 MG/DL (ref 8–23)
CALCIUM SERPL-MCNC: 9.5 MG/DL (ref 8.7–10.5)
CHLORIDE SERPL-SCNC: 105 MMOL/L (ref 95–110)
CO2 SERPL-SCNC: 24 MMOL/L (ref 23–29)
CREAT SERPL-MCNC: 1.1 MG/DL (ref 0.5–1.4)
GFR SERPLBLD CREATININE-BSD FMLA CKD-EPI: 48 ML/MIN/1.73/M2
GLUCOSE SERPL-MCNC: 89 MG/DL (ref 70–110)
POTASSIUM SERPL-SCNC: 4.2 MMOL/L (ref 3.5–5.1)
SODIUM SERPL-SCNC: 139 MMOL/L (ref 136–145)

## 2025-07-17 PROCEDURE — 80048 BASIC METABOLIC PNL TOTAL CA: CPT

## 2025-07-17 PROCEDURE — 36415 COLL VENOUS BLD VENIPUNCTURE: CPT | Mod: PO

## 2025-07-29 LAB
OHS CV AF BURDEN PERCENT: < 1
OHS CV BIV PACING PERCENT: 0 %
OHS CV DC REMOTE DEVICE TYPE: NORMAL
OHS CV ICD SHOCK: NO
OHS CV RV PACING PERCENT: 53.3 %